# Patient Record
Sex: FEMALE | Race: WHITE | Employment: OTHER | URBAN - METROPOLITAN AREA
[De-identification: names, ages, dates, MRNs, and addresses within clinical notes are randomized per-mention and may not be internally consistent; named-entity substitution may affect disease eponyms.]

---

## 2017-01-16 ENCOUNTER — OFFICE VISIT (OUTPATIENT)
Dept: FAMILY MEDICINE | Facility: CLINIC | Age: 57
End: 2017-01-16
Payer: COMMERCIAL

## 2017-01-16 VITALS
SYSTOLIC BLOOD PRESSURE: 117 MMHG | WEIGHT: 156.9 LBS | HEART RATE: 90 BPM | HEIGHT: 64 IN | DIASTOLIC BLOOD PRESSURE: 90 MMHG | RESPIRATION RATE: 18 BRPM | TEMPERATURE: 97.7 F | OXYGEN SATURATION: 97 % | BODY MASS INDEX: 26.79 KG/M2

## 2017-01-16 DIAGNOSIS — F40.01: ICD-10-CM

## 2017-01-16 DIAGNOSIS — Z79.899 CONTROLLED SUBSTANCE AGREEMENT SIGNED: ICD-10-CM

## 2017-01-16 DIAGNOSIS — F41.0 PANIC DISORDER WITHOUT AGORAPHOBIA: ICD-10-CM

## 2017-01-16 DIAGNOSIS — Z72.0 TOBACCO ABUSE: ICD-10-CM

## 2017-01-16 DIAGNOSIS — F41.9 ANXIETY: Primary | ICD-10-CM

## 2017-01-16 DIAGNOSIS — E78.2 MIXED HYPERLIPIDEMIA: ICD-10-CM

## 2017-01-16 DIAGNOSIS — R03.0 ELEVATED BLOOD PRESSURE READING WITHOUT DIAGNOSIS OF HYPERTENSION: ICD-10-CM

## 2017-01-16 DIAGNOSIS — F33.9 RECURRENT MAJOR DEPRESSIVE DISORDER, REMISSION STATUS UNSPECIFIED (H): ICD-10-CM

## 2017-01-16 PROCEDURE — 99214 OFFICE O/P EST MOD 30 MIN: CPT | Performed by: INTERNAL MEDICINE

## 2017-01-16 RX ORDER — ALPRAZOLAM 0.5 MG
0.5 TABLET ORAL 3 TIMES DAILY PRN
Qty: 90 TABLET | Refills: 2 | Status: SHIPPED | OUTPATIENT
Start: 2017-01-16 | End: 2017-10-02

## 2017-01-16 ASSESSMENT — ANXIETY QUESTIONNAIRES
6. BECOMING EASILY ANNOYED OR IRRITABLE: SEVERAL DAYS
GAD7 TOTAL SCORE: 9
5. BEING SO RESTLESS THAT IT IS HARD TO SIT STILL: MORE THAN HALF THE DAYS
7. FEELING AFRAID AS IF SOMETHING AWFUL MIGHT HAPPEN: SEVERAL DAYS
3. WORRYING TOO MUCH ABOUT DIFFERENT THINGS: MORE THAN HALF THE DAYS
1. FEELING NERVOUS, ANXIOUS, OR ON EDGE: SEVERAL DAYS
IF YOU CHECKED OFF ANY PROBLEMS ON THIS QUESTIONNAIRE, HOW DIFFICULT HAVE THESE PROBLEMS MADE IT FOR YOU TO DO YOUR WORK, TAKE CARE OF THINGS AT HOME, OR GET ALONG WITH OTHER PEOPLE: SOMEWHAT DIFFICULT
2. NOT BEING ABLE TO STOP OR CONTROL WORRYING: SEVERAL DAYS

## 2017-01-16 ASSESSMENT — PATIENT HEALTH QUESTIONNAIRE - PHQ9: 5. POOR APPETITE OR OVEREATING: SEVERAL DAYS

## 2017-01-16 NOTE — PROGRESS NOTES
SUBJECTIVE:                                                    Radha Turcios is a 56 year old female who presents to clinic today for the following health issues:    Radha presents to the clinic to establish care she is accompanied by her  Lexie and her sister Sowmya. Prior patient of Dr. Soriano's for years.    New Patient/Transfer of Care  Radha expresses that she has recurrent panic attacks. She has never seen a therapist or has any therapeutic interventions other than medications. She is currently taking Alprazolam 1-2 tabs daily and 3 times daily if she will be leaving the home. She has extensive fear (maybe a phobia) of leaving the home. Radha notes that she is unable to work due to having panic attacks. Radha grew very teary when discussing control of her anxiety and depression. She is frightened of making medication changes and going off of Xanax. I discussed that her anxiety and depression are not well controlled at this time anyway so I'm trying to help improve her symptoms - she was appreciative of that.  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  Radha continues to take supplemental Vitamin D and fish oil daily.   Pt had a negative FIT stool test this past August; however, her sister was dx with colon cancer. She is not ready to have full colonoscopy.  She has 5 cats, no children, she and her  Lexie have been  a number of years. He is her main  if they go out d/t her fear.    Patient Active Problem List   Diagnosis     Mixed hyperlipidemia     GERD (gastroesophageal reflux disease)     Anxiety     Depression     Insomnia     Controlled substance agreement signed       Panic disorder with agoraphobia, severe agoraphobic avoidance and mild panic attacks     Tobacco abuse     Past Surgical History   Procedure Laterality Date     No history of surgery         Social History   Substance Use Topics     Smoking  status: Current Every Day Smoker -- 1.00 packs/day for 23 years     Types: Cigarettes     Smokeless tobacco: Never Used     Alcohol Use: 0.0 oz/week     0 Standard drinks or equivalent per week      Comment: Occ      Family History   Problem Relation Age of Onset     Lipids Mother      HEART DISEASE Mother      Alzheimer Disease Mother      Allergies Mother      C.A.D. Mother      had MI     CANCER Father 58     pancreatic     HEART DISEASE Brother      C.A.D. Brother      2 brothers  of heart attacks/ one also had lung caner     CANCER Brother      2 brothers   of lung cancer     CANCER Sister      lung     Alcohol/Drug Sister       alcoholic cirrhosis     Depression Sister      anxiety disorder     Depression Brother      anxiety disorder     Colon Cancer Sister 51         Current Outpatient Prescriptions   Medication Sig Dispense Refill     ALPRAZolam (XANAX) 0.5 MG tablet Take 1 tablet (0.5 mg) by mouth 3 times daily as needed 90 tablet 2     valACYclovir (VALTREX) 500 MG tablet TAKE 4 TABLETS ONCE, REPEAT IN 12 HOURS AS NEEDED 8 tablet 2     fenofibrate 145 MG tablet Take 1 tablet (145 mg) by mouth daily 90 tablet 3     vilazodone (VIIBRYD) 40 MG TABS Take 1 tablet (40 mg) by mouth daily 90 tablet 3     rosuvastatin (CRESTOR) 40 MG tablet Take 1 tablet (40 mg) by mouth daily Profile Rx: patient will contact pharmacy when needed 90 tablet 3     amitriptyline (ELAVIL) 25 MG tablet Take 1-2 tablets (25-50 mg) by mouth At Bedtime Profile Rx: patient will contact pharmacy when needed 180 tablet 3     Cyanocobalamin (B-12) 1000 MCG TBCR Take 1,000 mcg by mouth daily 100 tablet 1     omeprazole 20 MG tablet Take 1 tablet (20 mg) by mouth daily 90 tablet 3     Omega-3 Fatty Acids (FISH OIL CONCENTRATE) 1000 MG CAPS        Cholecalciferol (VITAMIN D PO)        Allergies   Allergen Reactions     Amoxicillin      Rash         Problem list and histories reviewed & adjusted, as indicated.    ROS:  Detailed as  "above    This document serves as a record of the services and decisions personally performed and made by Azul Parker DO. It was created on his/her behalf by Renuka Darby, a trained medical scribe. The creation of this document is based the provider's statements to the medical scribe.  Scribe Renuka Darby 3:55 PM, January 16, 2017    OBJECTIVE:                                                    /90 mmHg  Pulse 90  Temp(Src) 97.7  F (36.5  C) (Oral)  Resp 18  Ht 5' 4.25\" (1.632 m)  Wt 156 lb 14.4 oz (71.169 kg)  BMI 26.72 kg/m2  SpO2 97%  LMP 09/14/2001  Breastfeeding? No  Body mass index is 26.72 kg/(m^2).  GENERAL: alert and no distress, appears older than stated age  RESP: lungs clear to auscultation - no rales, rhonchi or wheezes  CV: regular rate and rhythm, normal S1 S2, no S3 or S4, no murmur, click or rub  PSYCH: tearful at times when talking about death of siblings and mother, was re-consolable      ASSESSMENT/PLAN:                                                      1. Anxiety  Sub-optimally controlled, is on Viibryd and Amitriptyline  Planted seed of future counseling and CBT for panic disorder and trying to SLOWLY taper off of Xanax in the future  She is not working, but not formally on disability related to this     and family supportive  Anxiety is quite chronic along with panic disorder - see Dr. Soriano's last OV note which details some of her history nicely   BP was a bit high r/t anxiety/tearfulness but she really did warm up nicely towards end of visit - has some trauma with physicians of her siblings who have passed  - ALPRAZolam (XANAX) 0.5 MG tablet; Take 1 tablet (0.5 mg) by mouth 3 times daily as needed  Dispense: 90 tablet; Refill: 2  CYRUS-7 SCORE 8/7/2015 8/10/2016 1/16/2017   Total Score 14 - -   Total Score - 12 9       2. Panic disorder without agoraphobia  As above    3. Recurrent major depressive disorder, remission status unspecified (H)  As above  PHQ-9 " SCORE 8/7/2015 8/10/2016 1/16/2017   Total Score 15 - -   Total Score - 5 11       4. Mixed hyperlipidemia  Continue Crestor and TriCor as lipids historically quite high    5. Controlled substance agreement signed    6.Tobacco Abuse  Not currently ready to stop, but states is cutting back       Patient Instructions   Xanax (Alprazolam) to be sent to [facility]  Consider some more therapy or medication adjustment in the future - I'd be happy to discuss  For sure you are due for physical later this summer        The information in this document, created by the medical scribe for me, accurately reflects the services I personally performed and the decisions made by me. I have reviewed and approved this document for accuracy prior to leaving the patient care area.  Azul Parker DO  3:55 PM, 01/16/2017    MDM: 25 minutes spent with patient, over 50% time counseling, coordinating care and explaining about nature of the patient's conditions, especially reviewing PMH and chronic anxiety/panic.    Azul Parker DO  [facility]

## 2017-01-16 NOTE — NURSING NOTE
"Chief Complaint   Patient presents with     Establish Care       Initial /90 mmHg  Pulse 112  Temp(Src) 97.7  F (36.5  C) (Oral)  Resp 18  Ht 5' 4.25\" (1.632 m)  Wt 156 lb 14.4 oz (71.169 kg)  BMI 26.72 kg/m2  SpO2 97%  LMP 09/14/2001  Breastfeeding? No Estimated body mass index is 26.72 kg/(m^2) as calculated from the following:    Height as of this encounter: 5' 4.25\" (1.632 m).    Weight as of this encounter: 156 lb 14.4 oz (71.169 kg).  BP completed using cuff size: binh Gary CMA January 16, 2017 3:46 PM      "

## 2017-01-16 NOTE — PATIENT INSTRUCTIONS
Xanax (Alprazolam) to be sent to [facility]  Consider some more therapy or medication adjustment in the future - I'd be happy to discuss  For sure you are due for physical later this summer

## 2017-01-16 NOTE — Clinical Note
[facility]    01/16/2017    Patient: Radha Turcios  YOB: 1960  Medical Record Number: [id number]    Controlled Substance Agreement  I understand that my care provider has prescribed controlled substances (narcotics, tranquilizers, and/or stimulants) to help manage my condition(s).  I am taking this medicine to help me function or work.  I know that this is strong medicine.  It could have serious side effects and even cause a dependency on the drug.  If I stop these medicines suddenly, I could have severe withdrawal symptoms.    The risks, benefits, and side effects of these medication(s) were explained to me.  I agree that:  1. I will take part in other treatments as advised by my provider.  This may be psychiatry or counseling, physical therapy, behavioral therapy, group treatment, or a referral to a pain clinic.  I will reduce or stop my medicine when my provider tells me to do so.   2. I will take my medicines as prescribed.  I will not change the dose or schedule unless my provider tells me to.  There will be no refills if I  run out early.   I may be contacted at any time without warning and asked to complete a drug test or pill count.   3. I will keep all my appointments at the clinic.  If I miss appointments or fail to follow instructions, my provider may stop my medicine.  4. I will not ask other providers to prescribe controlled substances. And I will not accept controlled substances from other people. If I need another prescribed controlled substance for a new reason, I will notify my provider within one business day.  5. If I enroll in the [place] Medical Marijuana program, I will tell my provider.  I will also sign an agreement to share my medical records with my provider.  6. I will use one pharmacy to fill all of my controlled substance prescriptions.  If my prescription is mailed to my pharmacy, it may take 5 to 7 days for my medicine to be ready.  7. I  understand that my provider, clinic care team, and pharmacy can track controlled substance prescriptions from other providers through a central database (prescription monitoring program).  8. I will bring in my list of medications (or my medicine bottles) each time I come to the clinic.  Page 1 of 2      [facility]    01/16/2017    Patient: Radha Turcios  YOB: 1960  Medical Record Number: [id number]    9. Refills of controlled substances will be made only during office hours.  It is up to me to make sure that I do not run out of my medicines on weekends or holidays.    10. I am responsible for my prescriptions.  If the medicine is lost or stolen, it will not be replaced.   I also agree not to share these medicines with anyone.  11. I agree to not use ANY illegal or recreational drugs.  This includes marijuana, cocaine, bath salts or other drugs.  I agree not to use alcohol unless my provider says I may.  I agree to give urine samples whenever asked.  If I fail to give a urine sample, the provider may stop my medicine.     12. I will tell my nurse or provider right away if I become pregnant or have a new medical problem treated outside of [facility].  13. I understand that this medicine can affect my thinking and judgment.  It may be unsafe for me to drive, use machinery and do dangerous tasks.  I will not do any of these things until I know how the medicine affects me.  If my dose changes, I will wait to see how it affects me.  I will contact my provider if I have concerns about medicine side effects.  I understand that if I do not follow any of the conditions above, my prescriptions or treatment may be stopped.    I agree that my provider, clinic care team, and pharmacy may work with any city, state or federal law enforcement agency that investigates the misuse, sale, or other diversion of my controlled medicine. I will allow my provider to discuss my care with or share a copy  of this agreement with any other treating provider, pharmacy or emergency room where I receive care.  I agree to give up (waive) any right of privacy or confidentiality with respect to these authorizations.   I have read this agreement and have asked questions about anything I did not understand.   ___________________________________    ___________________________  Patient Signature                                                             Date and Time  ___________________________________     ____________________________  Witness                                                                              Date and Time  ___________________________________  Azul Parker, DO  Page 2 of 2  Opioid Pain Medicines (also known as Narcotics)  What You Need to Know      What are opioids?   Opioids are pain medicines that must be prescribed by a doctor. Examples are:     morphine (MS Contin, Domenica)    oxycodone (Oxycontin)    oxycodone and acetaminophen (Percocet)    hydrocodone and acetaminophen (Vicodin, Norco)     fentanyl patch (Duragesic)     hydromorphone (Dilaudid)     methadone     What do opioids do well?   Opioids are best for short-term pain after a surgery or injury. They also work well for cancer pain. Unlike other pain medicines, they do not cause liver or kidney failure or ulcers. They may help some people with long-lasting (chronic) pain.     What do opioids NOT do well?   Opioids never get rid of pain entirely, and they do not work well for most patients with chronic pain. Opioids do not reduce swelling, one of the causes of pain. They also don t work well for nerve pain.     Side effects  Talk to your doctor before you start or decide to keep taking one of these medicines. Side effects include:    Lowers your breathing rate enough that it could cause death    Death due to taking more than the prescribed dose    Serious lifelong opioid use      Dependence is not the same as addiction. Addiction is  when people keep using a substance that harms their body, their mind or their relations with others. If you have a history of drug or alcohol abuse, taking opioids can cause a relapse.  Over time, opioids don t work as well. Most people will need higher and higher doses. The higher the dose, the more serious the side effects. We don t know the long-term effects of opioids.   People who have used opioids for a long time have a lower quality of life, worse depression, higher levels of pain and more visits to doctors.  Overdose from prescription drugs is the second leading cause of death in the [place] The risk of overdose rises when opioids are taken with other drugs such as:    Medicines used for anxiety and panic attacks (such as lorazepam, alprazolam, clonazepam    Other sedatives    Alcohol    Illegal drugs such as heroin  Never share your opioids with others. Be sure to store opioids in a secure place, locked if possible.Young children can easily swallow them and overdose.     Are there other ways to manage pain?  Ways to help reduce pain:    Exercise every day.    Treat health problems that may be causing pain.    Treat mental health problems like depression and anxiety.     Worse depression symptoms; Less pleasure in things you usually enjoy    Feeling tired or sluggish    Slower thoughts or cloudy thinking    Being more sensitive to pain over time; Pain is harder to control.    Trouble sleeping or restless sleep    Changes in hormone levels (for example, less testosterone).     Changes in sex drive or ability to have sex    Long lasting nausea and constipation    Trouble breathing while asleep; This is worse with lung problems like COPD or sleep apnea.    Unsafe driving    Getting sick more often    Itching    Feeling dizzy    Dry mouth    Sweating    Trouble emptying the bladder (peeing). This is worse if you have an enlarged prostate or get urinary tract infections (UTIs).    What else should I know about  opioids?  When someone takes opioids for too long or too often, they become dependent. This means that if you stop or reduce the medicine too quickly, you will have withdrawal symptoms.          Practice good sleep habits.  Try to go to bed and get up at the same time every day.    Stop smoking.  Tobacco use can make pain worse.    Do things that you enjoy.    Find a way to work through pain without drugs.  Try deep breathing, meditation, visual imagery and aromatherapy.    Ask your doctor to help you create a plan to manage your pain.

## 2017-01-16 NOTE — MR AVS SNAPSHOT
"              After Visit Summary   1/16/2017    Radha Turcios    MRN: [id number]           Patient Information     Date Of Birth          1960        Visit Information        Provider Department      1/16/2017 4:00 PM Azul Parker,  [facility]        Today's Diagnoses     Anxiety    -  1     Panic disorder without agoraphobia         Recurrent major depressive disorder, remission status unspecified (H)         Mixed hyperlipidemia         Controlled substance agreement signed           Care Instructions    Xanax (Alprazolam) to be sent to [facility]  Consider some more therapy or medication adjustment in the future - I'd be happy to discuss  For sure you are due for physical later this summer        Follow-ups after your visit        Who to contact     If you have questions or need follow up information about today's clinic visit or your schedule please contact [facility] directly at [phone].  Normal or non-critical lab and imaging results will be communicated to you by MyChart, letter or phone within 4 business days after the clinic has received the results. If you do not hear from us within 7 days, please contact the clinic through [employer] or phone. If you have a critical or abnormal lab result, we will notify you by phone as soon as possible.  Submit refill requests through [employer] or call your pharmacy and they will forward the refill request to us. Please allow 3 business days for your refill to be completed.          Additional Information About Your Visit        MyChart Information     [employer] lets you send messages to your doctor, view your test results, renew your prescriptions, schedule appointments and more. To sign up, go to [email or web address] . Click on \"Log in\" on the left side of the screen, which will take you to the Welcome page. Then click on \"Sign up Now\" on the right side of the page.     You will be asked to enter the access code " "listed below, as well as some personal information. Please follow the directions to create your username and password.     Your access code is: [email or web address]  Expires: 2017  4:42 PM     Your access code will  in 90 days. If you need help or a new code, please call your [facility] or [phone].        Care EveryWhere ID     This is your Care EveryWhere ID. This could be used by other organizations to access your [facility] medical records  [id number]        Your Vitals Were     Pulse Temperature Respirations    90 97.7  F (36.5  C) (Oral) 18    Height BMI (Body Mass Index) Pulse Oximetry    5' 4.25\" (1.632 m) 26.72 kg/m2 97%    Last Period Breastfeeding?       2001 No        Blood Pressure from Last 3 Encounters:   17 117/90   08/10/16 125/83   08/07/15 126/78    Weight from Last 3 Encounters:   17 156 lb 14.4 oz (71.169 kg)   08/10/16 152 lb (68.947 kg)   08/07/15 151 lb (68.493 kg)              Today, you had the following     No orders found for display         Today's Medication Changes          These changes are accurate as of: 17  4:42 PM.  If you have any questions, ask your nurse or doctor.               These medicines have changed or have updated prescriptions.        Dose/Directions    fenofibrate 145 MG tablet   This may have changed:  Another medication with the same name was removed. Continue taking this medication, and follow the directions you see here.   Used for:  Mixed hyperlipidemia   Changed by:  Lavern Soriano MD        Dose:  145 mg   Take 1 tablet (145 mg) by mouth daily   Quantity:  90 tablet   Refills:  3       rosuvastatin 40 MG tablet   Commonly known as:  CRESTOR   This may have changed:  Another medication with the same name was removed. Continue taking this medication, and follow the directions you see here.   Used for:  Mixed hyperlipidemia   Changed by:  Lavern Sroiano MD        Dose:  40 mg   Take 1 tablet (40 mg) by mouth " daily Profile Rx: patient will contact pharmacy when needed   Quantity:  90 tablet   Refills:  3       valACYclovir 500 MG tablet   Commonly known as:  VALTREX   This may have changed:  Another medication with the same name was removed. Continue taking this medication, and follow the directions you see here.   Used for:  Cold sore   Changed by:  Lavern Soriano MD        TAKE 4 TABLETS ONCE, REPEAT IN 12 HOURS AS NEEDED   Quantity:  8 tablet   Refills:  2         Stop taking these medicines if you haven't already. Please contact your care team if you have questions.     sulfamethoxazole-trimethoprim 800-160 MG per tablet   Commonly known as:  BACTRIM DS/SEPTRA DS   Stopped by:  Azul Parker,                 Where to get your medicines      Some of these will need a paper prescription and others can be bought over the counter.  Ask your nurse if you have questions.     Bring a paper prescription for each of these medications    - ALPRAZolam 0.5 MG tablet             Primary Care Provider Office Phone # Fax #    Azul Parker [phone]       [address] [address]        Thank you!     Thank you for choosing [facility]  for your care. Our goal is always to provide you with excellent care. Hearing back from our patients is one way we can continue to improve our services. Please take a few minutes to complete the written survey that you may receive in the mail after your visit with us. Thank you!             Your Updated Medication List - Protect others around you: Learn how to safely use, store and throw away your medicines at www.disposemymeds.org.          This list is accurate as of: 1/16/17  4:42 PM.  Always use your most recent med list.                   Brand Name Dispense Instructions for use    ALPRAZolam 0.5 MG tablet    XANAX    90 tablet    Take 1 tablet (0.5 mg) by mouth 3 times daily as needed       amitriptyline  25 MG tablet    ELAVIL    180 tablet    Take 1-2 tablets (25-50 mg) by mouth At Bedtime Profile Rx: patient will contact pharmacy when needed       B-12 1000 MCG Tbcr     100 tablet    Take 1,000 mcg by mouth daily       fenofibrate 145 MG tablet     90 tablet    Take 1 tablet (145 mg) by mouth daily       FISH OIL CONCENTRATE 1000 MG Caps          omeprazole 20 MG tablet     90 tablet    Take 1 tablet (20 mg) by mouth daily       rosuvastatin 40 MG tablet    CRESTOR    90 tablet    Take 1 tablet (40 mg) by mouth daily Profile Rx: patient will contact pharmacy when needed       valACYclovir 500 MG tablet    VALTREX    8 tablet    TAKE 4 TABLETS ONCE, REPEAT IN 12 HOURS AS NEEDED       vilazodone 40 MG Tabs tablet    VIIBRYD    90 tablet    Take 1 tablet (40 mg) by mouth daily       VITAMIN D PO

## 2017-01-17 ASSESSMENT — PATIENT HEALTH QUESTIONNAIRE - PHQ9: SUM OF ALL RESPONSES TO PHQ QUESTIONS 1-9: 11

## 2017-01-17 ASSESSMENT — ANXIETY QUESTIONNAIRES: GAD7 TOTAL SCORE: 9

## 2017-02-01 PROBLEM — Z79.899 CONTROLLED SUBSTANCE AGREEMENT SIGNED: Chronic | Status: ACTIVE | Noted: 2017-01-16

## 2017-02-01 PROBLEM — R03.0 ELEVATED BLOOD PRESSURE READING WITHOUT DIAGNOSIS OF HYPERTENSION: Status: ACTIVE | Noted: 2017-02-01

## 2017-07-20 DIAGNOSIS — K21.9 GASTROESOPHAGEAL REFLUX DISEASE WITHOUT ESOPHAGITIS: ICD-10-CM

## 2017-07-20 NOTE — TELEPHONE ENCOUNTER
Prescription approved per [facility] Refill Protocol.  Brittaney Renae RN

## 2017-07-20 NOTE — TELEPHONE ENCOUNTER
omeprazole (PRILOSEC) 20 MG CR capsule        Last Written Prescription Date: 8/10/2016  Last Fill Quantity: 90,  # refills: 3   Last Office Visit with [facility], [facility] or [facility] prescribing provider: 1/16/2017

## 2017-09-04 DIAGNOSIS — E78.2 MIXED HYPERLIPIDEMIA: ICD-10-CM

## 2017-09-05 RX ORDER — FENOFIBRATE 145 MG/1
TABLET, COATED ORAL
Qty: 90 TABLET | Refills: 3 | Status: SHIPPED | OUTPATIENT
Start: 2017-09-05 | End: 2018-09-02

## 2017-09-05 NOTE — TELEPHONE ENCOUNTER
fenofibrate       Last Written Prescription Date: 08/10/16  Last Fill Quantity: 90, # refills: 3    Last Office Visit with G, P or [facility] prescribing provider:  01/16/17   Future Office Visit:    Next 5 appointments (look out 90 days)     Oct 02, 2017  8:30 AM CDT   [facility]ul Parker DO   [facility]    [address] [address]   [phone]                  Cholesterol   Date Value Ref Range Status   08/10/2016 115 <200 mg/dL Final     HDL Cholesterol   Date Value Ref Range Status   08/10/2016 10 (L) >49 mg/dL Final     LDL Cholesterol Calculated   Date Value Ref Range Status   08/10/2016 52 <100 mg/dL Final     Comment:     Desirable:       <100 mg/dl     Triglycerides   Date Value Ref Range Status   08/10/2016 267 (H) <150 mg/dL Final     Comment:     Borderline high:  150-199 mg/dl   High:             200-499 mg/dl   Very high:       >499 mg/dl   Fasting specimen       Cholesterol/HDL Ratio   Date Value Ref Range Status   08/07/2015 7.6 (H) 0.0 - 5.0 Final     ALT   Date Value Ref Range Status   08/10/2016 23 0 - 50 U/L Final        Roxie Interiano MA

## 2017-09-05 NOTE — TELEPHONE ENCOUNTER
Routing refill request to provider for review/approval because:  Patient now established with you  Last Rx for below med from former PCP Dr Soriano  Please approve if appropriate  Natalie LOERA RN

## 2017-10-02 ENCOUNTER — OFFICE VISIT (OUTPATIENT)
Dept: FAMILY MEDICINE | Facility: CLINIC | Age: 57
End: 2017-10-02
Payer: COMMERCIAL

## 2017-10-02 VITALS
HEART RATE: 98 BPM | TEMPERATURE: 96.9 F | SYSTOLIC BLOOD PRESSURE: 131 MMHG | OXYGEN SATURATION: 97 % | HEIGHT: 64 IN | DIASTOLIC BLOOD PRESSURE: 86 MMHG | WEIGHT: 150.1 LBS | BODY MASS INDEX: 25.62 KG/M2

## 2017-10-02 DIAGNOSIS — Z11.59 NEED FOR HEPATITIS C SCREENING TEST: ICD-10-CM

## 2017-10-02 DIAGNOSIS — Z00.01 ENCOUNTER FOR PREVENTATIVE ADULT HEALTH CARE EXAM WITH ABNORMAL FINDINGS: Primary | ICD-10-CM

## 2017-10-02 DIAGNOSIS — Z72.0 TOBACCO ABUSE: Chronic | ICD-10-CM

## 2017-10-02 DIAGNOSIS — E78.2 MIXED HYPERLIPIDEMIA: ICD-10-CM

## 2017-10-02 DIAGNOSIS — F41.9 ANXIETY: ICD-10-CM

## 2017-10-02 DIAGNOSIS — Z12.11 SCREEN FOR COLON CANCER: ICD-10-CM

## 2017-10-02 DIAGNOSIS — Z12.31 VISIT FOR SCREENING MAMMOGRAM: ICD-10-CM

## 2017-10-02 DIAGNOSIS — F33.9 EPISODE OF RECURRENT MAJOR DEPRESSIVE DISORDER, UNSPECIFIED DEPRESSION EPISODE SEVERITY (H): ICD-10-CM

## 2017-10-02 LAB
ALBUMIN SERPL-MCNC: 4.1 G/DL (ref 3.4–5)
ALP SERPL-CCNC: 44 U/L (ref 40–150)
ALT SERPL W P-5'-P-CCNC: 24 U/L (ref 0–50)
ANION GAP SERPL CALCULATED.3IONS-SCNC: 8 MMOL/L (ref 3–14)
AST SERPL W P-5'-P-CCNC: 50 U/L (ref 0–45)
BILIRUB SERPL-MCNC: 0.7 MG/DL (ref 0.2–1.3)
BUN SERPL-MCNC: 13 MG/DL (ref 7–30)
CALCIUM SERPL-MCNC: 8.8 MG/DL (ref 8.5–10.1)
CHLORIDE SERPL-SCNC: 101 MMOL/L (ref 94–109)
CHOLEST SERPL-MCNC: 90 MG/DL
CO2 SERPL-SCNC: 25 MMOL/L (ref 20–32)
CREAT SERPL-MCNC: 0.64 MG/DL (ref 0.52–1.04)
DEPRECATED CALCIDIOL+CALCIFEROL SERPL-MC: 42 UG/L (ref 20–75)
ERYTHROCYTE [DISTWIDTH] IN BLOOD BY AUTOMATED COUNT: 13.6 % (ref 10–15)
GFR SERPL CREATININE-BSD FRML MDRD: >90 ML/MIN/1.7M2
GLUCOSE SERPL-MCNC: 85 MG/DL (ref 70–99)
HCT VFR BLD AUTO: 38.4 % (ref 35–47)
HCV AB SERPL QL IA: NONREACTIVE
HDLC SERPL-MCNC: 12 MG/DL
HGB BLD-MCNC: 13.6 G/DL (ref 11.7–15.7)
LDLC SERPL CALC-MCNC: 42 MG/DL
MCH RBC QN AUTO: 38.6 PG (ref 26.5–33)
MCHC RBC AUTO-ENTMCNC: 35.4 G/DL (ref 31.5–36.5)
MCV RBC AUTO: 109 FL (ref 78–100)
NONHDLC SERPL-MCNC: 78 MG/DL
PLATELET # BLD AUTO: 167 10E9/L (ref 150–450)
POTASSIUM SERPL-SCNC: 4 MMOL/L (ref 3.4–5.3)
PROT SERPL-MCNC: 8.1 G/DL (ref 6.8–8.8)
RBC # BLD AUTO: 3.52 10E12/L (ref 3.8–5.2)
SODIUM SERPL-SCNC: 134 MMOL/L (ref 133–144)
T4 FREE SERPL-MCNC: 1.22 NG/DL (ref 0.76–1.46)
TRIGL SERPL-MCNC: 179 MG/DL
TSH SERPL DL<=0.005 MIU/L-ACNC: 5.06 MU/L (ref 0.4–4)
WBC # BLD AUTO: 5.6 10E9/L (ref 4–11)

## 2017-10-02 PROCEDURE — 82306 VITAMIN D 25 HYDROXY: CPT | Performed by: INTERNAL MEDICINE

## 2017-10-02 PROCEDURE — 80061 LIPID PANEL: CPT | Performed by: INTERNAL MEDICINE

## 2017-10-02 PROCEDURE — 99396 PREV VISIT EST AGE 40-64: CPT | Performed by: INTERNAL MEDICINE

## 2017-10-02 PROCEDURE — 80053 COMPREHEN METABOLIC PANEL: CPT | Performed by: INTERNAL MEDICINE

## 2017-10-02 PROCEDURE — 86803 HEPATITIS C AB TEST: CPT | Performed by: INTERNAL MEDICINE

## 2017-10-02 PROCEDURE — 84439 ASSAY OF FREE THYROXINE: CPT | Performed by: INTERNAL MEDICINE

## 2017-10-02 PROCEDURE — 84443 ASSAY THYROID STIM HORMONE: CPT | Performed by: INTERNAL MEDICINE

## 2017-10-02 PROCEDURE — 85027 COMPLETE CBC AUTOMATED: CPT | Performed by: INTERNAL MEDICINE

## 2017-10-02 PROCEDURE — 36415 COLL VENOUS BLD VENIPUNCTURE: CPT | Performed by: INTERNAL MEDICINE

## 2017-10-02 RX ORDER — VALACYCLOVIR HYDROCHLORIDE 500 MG/1
TABLET, FILM COATED ORAL
Qty: 8 TABLET | Refills: 2 | Status: CANCELLED | OUTPATIENT
Start: 2017-10-02

## 2017-10-02 RX ORDER — VILAZODONE HYDROCHLORIDE 40 MG/1
40 TABLET ORAL DAILY
Qty: 90 TABLET | Refills: 3 | Status: CANCELLED | OUTPATIENT
Start: 2017-10-02

## 2017-10-02 RX ORDER — ALPRAZOLAM 0.5 MG
0.5 TABLET ORAL 3 TIMES DAILY PRN
Qty: 90 TABLET | Refills: 2 | Status: CANCELLED | OUTPATIENT
Start: 2017-10-02

## 2017-10-02 RX ORDER — ROSUVASTATIN CALCIUM 40 MG/1
40 TABLET, COATED ORAL DAILY
Qty: 90 TABLET | Refills: 3 | Status: CANCELLED | OUTPATIENT
Start: 2017-10-02

## 2017-10-02 RX ORDER — ALPRAZOLAM 0.5 MG
0.5 TABLET ORAL 3 TIMES DAILY PRN
Qty: 90 TABLET | Refills: 0 | Status: SHIPPED | OUTPATIENT
Start: 2017-10-02 | End: 2018-01-13

## 2017-10-02 RX ORDER — VILAZODONE HYDROCHLORIDE 40 MG/1
40 TABLET ORAL DAILY
Qty: 90 TABLET | Refills: 3 | Status: SHIPPED | OUTPATIENT
Start: 2017-10-02 | End: 2018-09-23

## 2017-10-02 RX ORDER — ROSUVASTATIN CALCIUM 40 MG/1
40 TABLET, COATED ORAL DAILY
Qty: 90 TABLET | Refills: 3 | Status: SHIPPED | OUTPATIENT
Start: 2017-10-02 | End: 2018-07-18

## 2017-10-02 ASSESSMENT — ANXIETY QUESTIONNAIRES
2. NOT BEING ABLE TO STOP OR CONTROL WORRYING: MORE THAN HALF THE DAYS
5. BEING SO RESTLESS THAT IT IS HARD TO SIT STILL: NOT AT ALL
IF YOU CHECKED OFF ANY PROBLEMS ON THIS QUESTIONNAIRE, HOW DIFFICULT HAVE THESE PROBLEMS MADE IT FOR YOU TO DO YOUR WORK, TAKE CARE OF THINGS AT HOME, OR GET ALONG WITH OTHER PEOPLE: SOMEWHAT DIFFICULT
GAD7 TOTAL SCORE: 12
3. WORRYING TOO MUCH ABOUT DIFFERENT THINGS: MORE THAN HALF THE DAYS
1. FEELING NERVOUS, ANXIOUS, OR ON EDGE: MORE THAN HALF THE DAYS
7. FEELING AFRAID AS IF SOMETHING AWFUL MIGHT HAPPEN: MORE THAN HALF THE DAYS
6. BECOMING EASILY ANNOYED OR IRRITABLE: MORE THAN HALF THE DAYS

## 2017-10-02 ASSESSMENT — PATIENT HEALTH QUESTIONNAIRE - PHQ9
5. POOR APPETITE OR OVEREATING: MORE THAN HALF THE DAYS
SUM OF ALL RESPONSES TO PHQ QUESTIONS 1-9: 10

## 2017-10-02 NOTE — MR AVS SNAPSHOT
After Visit Summary   10/2/2017    Radha Turcios    MRN: [id number]           Patient Information     Date Of Birth          1960        Visit Information        Provider Department      10/2/2017 8:30 AM Azul Parker,  [facility] [address]        Today's Diagnoses     Encounter for preventative adult health care exam with abnormal findings    -  1    Episode of recurrent major depressive disorder, unspecified depression episode severity (H)        Anxiety        Mixed hyperlipidemia        Tobacco abuse        Screen for colon cancer        Visit for screening mammogram        Need for hepatitis C screening test        Need for prophylactic vaccination and inoculation against influenza          Care Instructions    Labs today  Schedule mammogram  - [phone] in [address]   I refilled your medications --> fenofibrate is the same as Tricor  I suggest follow up in 6 months to recheck your anxiety    Preventive Health Recommendations  Female Ages 50 - 64    Yearly exam: See your health care provider every year in order to  o Review health changes.   o Discuss preventive care.    o Review your medicines if your doctor has prescribed any.      Get a Pap test every three years (unless you have an abnormal result and your provider advises testing more often).    If you get Pap tests with HPV test, you only need to test every 5 years, unless you have an abnormal result.     You do not need a Pap test if your uterus was removed (hysterectomy) and you have not had cancer.    You should be tested each year for STDs (sexually transmitted diseases) if you're at risk.     Have a mammogram every 1 to 2 years.    Have a colonoscopy at age 50, or have a yearly FIT test (stool test). These exams screen for colon cancer.      Have a cholesterol test every 5 years, or more often if advised.    Have a diabetes test (fasting glucose) every three years. If you are  at risk for diabetes, you should have this test more often.     If you are at risk for osteoporosis (brittle bone disease), think about having a bone density scan (DEXA).    Shots: Get a flu shot each year. Get a tetanus shot every 10 years.    Nutrition:     Eat at least 5 servings of fruits and vegetables each day.    Eat whole-grain bread, whole-wheat pasta and brown rice instead of white grains and rice.    Talk to your provider about Calcium and Vitamin D.     Lifestyle    Exercise at least 150 minutes a week (30 minutes a day, 5 days a week). This will help you control your weight and prevent disease.    Limit alcohol to one drink per day.    No smoking.     Wear sunscreen to prevent skin cancer.     See your dentist every six months for an exam and cleaning.    See your eye doctor every 1 to 2 years.            Follow-ups after your visit        Future tests that were ordered for you today     Open Future Orders        Priority Expected Expires Ordered    MA SCREENING DIGITAL BILAT - Future  (s+30) Routine  10/2/2018 10/2/2017    Fecal colorectal cancer screen (FIT) Routine 10/23/2017 12/25/2017 10/2/2017            Who to contact     If you have questions or need follow up information about today's clinic visit or your schedule please contact [facility] directly at [phone].  Normal or non-critical lab and imaging results will be communicated to you by MyChart, letter or phone within 4 business days after the clinic has received the results. If you do not hear from us within 7 days, please contact the clinic through MyChart or phone. If you have a critical or abnormal lab result, we will notify you by phone as soon as possible.  Submit refill requests through [employer] or call your pharmacy and they will forward the refill request to us. Please allow 3 business days for your refill to be completed.          Additional Information About Your Visit        Care EveryWhere ID     This is your Care  "EveryWhere ID. This could be used by other organizations to access your [place] medical records  [id number]        Your Vitals Were     Pulse Temperature Height Last Period Pulse Oximetry BMI (Body Mass Index)    98 96.9  F (36.1  C) (Tympanic) 5' 4.25\" (1.632 m) 09/14/2001 97% 25.56 kg/m2       Blood Pressure from Last 3 Encounters:   10/02/17 131/86   01/16/17 117/90   08/10/16 125/83    Weight from Last 3 Encounters:   10/02/17 150 lb 1.6 oz (68.1 kg)   01/16/17 156 lb 14.4 oz (71.2 kg)   08/10/16 152 lb (68.9 kg)              We Performed the Following     CBC with platelets     Comprehensive metabolic panel     Hepatitis C Screen Reflex to HCV RNA Quant and Genotype     Lipid panel reflex to direct LDL     TSH with free T4 reflex     Vitamin D Deficiency          Today's Medication Changes          These changes are accurate as of: 10/2/17  9:22 AM.  If you have any questions, ask your nurse or doctor.               These medicines have changed or have updated prescriptions.        Dose/Directions    amitriptyline 25 MG tablet   Commonly known as:  ELAVIL   This may have changed:  additional instructions   Used for:  Episode of recurrent major depressive disorder, unspecified depression episode severity (H), Anxiety   Changed by:  Azul Parker DO        Dose:  25-50 mg   Take 1-2 tablets (25-50 mg) by mouth At Bedtime   Quantity:  180 tablet   Refills:  3       rosuvastatin 40 MG tablet   Commonly known as:  CRESTOR   This may have changed:  additional instructions   Used for:  Mixed hyperlipidemia   Changed by:  Azul Parker DO        Dose:  40 mg   Take 1 tablet (40 mg) by mouth daily   Quantity:  90 tablet   Refills:  3            Where to get your medicines      These medications were sent to [facility] - [address]  [address] [facility] [address]     Phone:  [phone]     amitriptyline 25 MG tablet    rosuvastatin 40 MG tablet    " vilazodone 40 MG Tabs tablet         Some of these will need a paper prescription and others can be bought over the counter.  Ask your nurse if you have questions.     Bring a paper prescription for each of these medications     ALPRAZolam 0.5 MG tablet                Primary Care Provider Office Phone # Fax #    Azul Parker [phone] [address] [facility] [address]  [facility] [address]        Equal Access to Services     JOEY CARRASCO : Hadii aad ku hadasho Soomaali, waaxda luqadaha, qaybta kaalmada adeegyada, waxay idiin hayaan adeeg kharash la'pedro . So [facility] [phone].    ATENCIÓN: Si joela dawit, tiene a rose disposición servicios gratuitos de asistencia lingüística. [facility] [phone].    We comply with applicable federal civil rights laws and [place] laws. We do not discriminate on the basis of race, color, national origin, age, disability, sex, sexual orientation, or gender identity.            Thank you!     Thank you for choosing [facility]  for your care. Our goal is always to provide you with excellent care. Hearing back from our patients is one way we can continue to improve our services. Please take a few minutes to complete the written survey that you may receive in the mail after your visit with us. Thank you!             Your Updated Medication List - Protect others around you: Learn how to safely use, store and throw away your medicines at www.disposemymeds.org.          This list is accurate as of: 10/2/17  9:22 AM.  Always use your most recent med list.                   Brand Name Dispense Instructions for use Diagnosis    ALPRAZolam 0.5 MG tablet    XANAX    90 tablet    Take 1 tablet (0.5 mg) by mouth 3 times daily as needed    Anxiety       amitriptyline 25 MG tablet    ELAVIL    180 tablet    Take 1-2 tablets (25-50 mg) by mouth At Bedtime    Episode of recurrent major depressive disorder, unspecified depression episode severity (H), Anxiety       B-12  1000 MCG Tbcr     100 tablet    Take 1,000 mcg by mouth daily    Vitamin B12 deficiency (non anemic)       fenofibrate 145 MG tablet     90 tablet    TAKE 1 TABLET DAILY    Mixed hyperlipidemia       FISH OIL CONCENTRATE 1000 MG Caps           omeprazole 20 MG CR capsule    priLOSEC    90 capsule    TAKE 1 CAPSULE DAILY    Gastroesophageal reflux disease without esophagitis       rosuvastatin 40 MG tablet    CRESTOR    90 tablet    Take 1 tablet (40 mg) by mouth daily    Mixed hyperlipidemia       valACYclovir 500 MG tablet    VALTREX    8 tablet    TAKE 4 TABLETS ONCE, REPEAT IN 12 HOURS AS NEEDED    Cold sore       vilazodone 40 MG Tabs tablet    VIIBRYD    90 tablet    Take 1 tablet (40 mg) by mouth daily    Anxiety, Episode of recurrent major depressive disorder, unspecified depression episode severity (H)       VITAMIN D PO

## 2017-10-02 NOTE — PROGRESS NOTES
"   SUBJECTIVE:   CC: Radha RODGER Turcios is an 57 year old woman who presents for preventive health visit.     Healthy Habits:    Do you get at least three servings of calcium containing foods daily (dairy, green leafy vegetables, etc.)? yes    Amount of exercise or daily activities, outside of work: 1-2 day(s) per week - tries to walk a little bit but hindered by her back but says has \"chronic back pain\" - flared when bending over to feed cats - ice/heat over the weekend    Problems taking medications regularly No    Medication side effects: No    Have you had an eye exam in the past two years? yes    Do you see a dentist twice per year? no    Do you have sleep apnea, excessive snoring or daytime drowsiness?no    See above re: back pain. Doing own exercises so doesn't want physical therapy.   Describes across lower back and tailbone without radiculopathy.   helps with massage.  Doesn't take NSAIDs on a regular basis.  1 ppd smoker with morning cough which is mild at times. No blood in sputum. Smoking on and off for several years - estimates about 20 years. Declines flu shot.  Denies chest pain.  No breast changes or skin concerns.  Sister with colon cancer. Radha denies changes in bowel habits. Declines colonoscopy but would do FIT again. Nervous about colonoscopy.  Postmenopausal and no vaginal bleeding.  GERD controlled with Prilosec.  Valtrex only 2-3 times per year for cold sores.  She has trouble knowing her medications and is getting mixed up with brand and generic names. Relies on .   Severe anxiety - says takes 1-2 Amitriptyline at night and sleeps well though.   Viibryd is helpful and  notices it really helps her; if she accidentally misses it then she is really wound-up.  Declines extra counseling or frequent f/u to improve life and anxiety.  Gets confused about her Xanax and says \"I take it once per day\" - but then \"I take it morning, afternoon and night\". Says sometimes doesn't " "take it at all.    Today's PHQ-2 Score:   PHQ-2 ( 1999 [employer]) 10/2/2017 1/16/2017   Q1: Little interest or pleasure in doing things 0 2   Q2: Feeling down, depressed or hopeless 3 1   PHQ-2 Score 3 3       Abuse: Current or Past(Physical, Sexual or Emotional) - No  Do you feel safe in your environment - Yes    Social History   Substance Use Topics     Smoking status: Current Every Day Smoker     Packs/day: 1.00     Years: 23.00     Types: Cigarettes     Smokeless tobacco: Never Used     Alcohol use 0.0 oz/week     0 Standard drinks or equivalent per week      Comment: 4-5 drinks per week     The patient does not drink >3 drinks per day nor >7 drinks per week.    Reviewed orders with patient.  Reviewed health maintenance and updated orders accordingly - Yes  Patient over age 50, mutual decision to screen reflected in health maintenance.  Pertinent mammograms are reviewed under the imaging tab.  History of abnormal Pap smear: Yes, several years ago though - she estimates > 10 yrs ago colposcopy  - age 30-65 PAP every 3-5 years with negative HPV co-testing recommended    ROS:  Comprehensive ROS negative unless as stated above in HPI.      OBJECTIVE:   /86 (BP Location: Left arm, Patient Position: Chair, Cuff Size: Adult Regular)  Pulse 98  Temp 96.9  F (36.1  C) (Tympanic)  Ht 5' 4.25\" (1.632 m)  Wt 150 lb 1.6 oz (68.1 kg)  LMP 09/14/2001  SpO2 97%  BMI 25.56 kg/m2  EXAM:  GENERAL APPEARANCE: alert, no distress and appears older than stated age  EYES: Eyes grossly normal to inspection, PERRL and conjunctivae and sclerae normal  HENT: ear canals and TM's normal, nose and mouth without ulcers or lesions, oropharynx clear and oral mucous membranes moist  NECK: no adenopathy, no asymmetry, masses, or scars and thyroid normal to palpation  RESP: lungs clear to auscultation - no rales, rhonchi or wheezes  BREAST: normal without masses, tenderness or nipple discharge and no palpable axillary masses or " adenopathy  CV: regular rate and rhythm, normal S1 S2, no S3 or S4, no murmur, click or rub, no peripheral edema   ABDOMEN: soft, nontender, no hepatosplenomegaly, no masses and bowel sounds normal  MS: no musculoskeletal defects are noted and is slightly cautious getting up and down from exam table  SKIN: no suspicious lesions or rashes  NEURO: Normal strength and tone, speech slowed, vague about her medications  PSYCH: nervous, anxious    PHQ-9 SCORE 8/10/2016 1/16/2017 10/2/2017   Total Score - - -   Total Score 5 11 10     CYRUS-7 SCORE 8/10/2016 1/16/2017 10/2/2017   Total Score - - -   Total Score 12 9 12         ASSESSMENT/PLAN:   1. Encounter for preventative adult health care exam with abnormal findings  Declines flu shot  Also declines physical therapy for her back pain flare up - is getting better she says  - CBC with platelets  - Comprehensive metabolic panel  - Vitamin D Deficiency    2. Episode of recurrent major depressive disorder, unspecified depression episode severity (H)  Poor scores but she feels stable  - vilazodone (VIIBRYD) 40 MG TABS tablet; Take 1 tablet (40 mg) by mouth daily  Dispense: 90 tablet; Refill: 3  - amitriptyline (ELAVIL) 25 MG tablet; Take 1-2 tablets (25-50 mg) by mouth At Bedtime  Dispense: 180 tablet; Refill: 3    3. Anxiety  Per her prior PCP who knew Radha well, Radha and family have very strong family h/o significant anxiety  Told her f/u in 6 months at the minimum is expected  Her Xanax fill dates don't match - unsure how she is actually taking this as noted above  Has been on same dose a long time  Declines counseling  - TSH with free T4 reflex  - ALPRAZolam (XANAX) 0.5 MG tablet; Take 1 tablet (0.5 mg) by mouth 3 times daily as needed  Dispense: 90 tablet; Refill: 0  - vilazodone (VIIBRYD) 40 MG TABS tablet; Take 1 tablet (40 mg) by mouth daily  Dispense: 90 tablet; Refill: 3  - amitriptyline (ELAVIL) 25 MG tablet; Take 1-2 tablets (25-50 mg) by mouth At Bedtime   "Dispense: 180 tablet; Refill: 3    4. Mixed hyperlipidemia  Wants to stay on TG medication  - Lipid panel reflex to direct LDL  - rosuvastatin (CRESTOR) 40 MG tablet; Take 1 tablet (40 mg) by mouth daily  Dispense: 90 tablet; Refill: 3    5. Tobacco abuse  Not ready to quit  Doesn't fit into lung cancer screening guidelines    6. Screen for colon cancer  Sister with colon cancer but she declines colonoscopy  - Fecal colorectal cancer screen (FIT); Future    7. Visit for screening mammogram  - MA SCREENING DIGITAL BILAT - Future  (s+30); Future    8. Need for hepatitis C screening test  - Hepatitis C Screen Reflex to HCV RNA Quant and Genotype    COUNSELING:   Reviewed preventive health counseling, as reflected in patient instructions       Regular exercise       Healthy diet/nutrition   reports that she has been smoking Cigarettes.  She has a 23.00 pack-year smoking history. She has never used smokeless tobacco.  Tobacco Cessation Action Plan: Information offered: Patient not interested at this time  Estimated body mass index is 25.56 kg/(m^2) as calculated from the following:    Height as of this encounter: 5' 4.25\" (1.632 m).    Weight as of this encounter: 150 lb 1.6 oz (68.1 kg).   Weight management plan: Discussed healthy diet and exercise guidelines and patient will follow up in 6 months in clinic to re-evaluate.    Patient Instructions   Labs today  Schedule mammogram  - [phone] in [address]   I refilled your medications --> fenofibrate is the same as Tricor  I suggest follow up in 6 months to recheck your anxiety      Azul Parker, DO  [facility]  "

## 2017-10-02 NOTE — LETTER
[facility]  [address] [facility]  [address]  [place]  [address]  Tel: [phone]    October 16, 2017    Radha Turcios  [address]   [facility] [address]        Radha,    It was nice to see you in clinic recently!   I'm happy to report that your stool test is within normal limits.   Your vitamin D is within normal limits on your current supplement.  Your hepatitis C screening test is normal.  Your kidney function (GFR) is normal. Your fasting glucose is normal; thus there is no evidence of diabetes at this time.  One liver test (AST) and thyroid test (TSH) is slightly abnormal. I'd suggest a recheck of these labs in a few months at a follow up office visit to follow the trend. Nothing is urgent about this but it should be rechecked in a few months.  Please try to limit alcohol intake as that can be hard on the liver.   Your cholesterol level is also plenty low. I suggest cutting your Crestor dose in half as that may also improve your liver AST number.  In summary, please schedule an office visit where I can recheck fasting lab work in about 3 months after you have cut your Crestor dose in half.  Please let me know if you have any questions or concerns.     Sincerely,    Azul Parker, DO /jennifer          Resulted Orders   Hepatitis C Screen Reflex to HCV RNA Quant and Genotype   Result Value Ref Range    Hepatitis C Antibody Nonreactive NR^Nonreactive      Comment:      Assay performance characteristics have not been established for newborns,   infants, and children     Lipid panel reflex to direct LDL   Result Value Ref Range    Cholesterol 90 <200 mg/dL    Triglycerides 179 (H) <150 mg/dL      Comment:      Borderline high:  150-199 mg/dl  High:             200-499 mg/dl  Very high:       >499 mg/dl  Fasting specimen      HDL Cholesterol 12 (L) >49 mg/dL    LDL Cholesterol Calculated 42 <100 mg/dL      Comment:      Desirable:       <100 mg/dl    Non HDL Cholesterol 78 <130  mg/dL   CBC with platelets   Result Value Ref Range    WBC 5.6 4.0 - 11.0 10e9/L    RBC Count 3.52 (L) 3.8 - 5.2 10e12/L    Hemoglobin 13.6 11.7 - 15.7 g/dL    Hematocrit 38.4 35.0 - 47.0 %     (H) 78 - 100 fl    MCH 38.6 (H) 26.5 - 33.0 pg    MCHC 35.4 31.5 - 36.5 g/dL    RDW 13.6 10.0 - 15.0 %    Platelet Count 167 150 - 450 10e9/L   Comprehensive metabolic panel   Result Value Ref Range    Sodium 134 133 - 144 mmol/L    Potassium 4.0 3.4 - 5.3 mmol/L    Chloride 101 94 - 109 mmol/L    Carbon Dioxide 25 20 - 32 mmol/L    Anion Gap 8 3 - 14 mmol/L    Glucose 85 70 - 99 mg/dL      Comment:      Fasting specimen    Urea Nitrogen 13 7 - 30 mg/dL    Creatinine 0.64 0.52 - 1.04 mg/dL    GFR Estimate >90 >60 mL/min/1.7m2      Comment:      Non  GFR Calc    GFR Estimate If Black >90 >60 mL/min/1.7m2      Comment:       GFR Calc    Calcium 8.8 8.5 - 10.1 mg/dL    Bilirubin Total 0.7 0.2 - 1.3 mg/dL    Albumin 4.1 3.4 - 5.0 g/dL    Protein Total 8.1 6.8 - 8.8 g/dL    Alkaline Phosphatase 44 40 - 150 U/L    ALT 24 0 - 50 U/L    AST 50 (H) 0 - 45 U/L   TSH with free T4 reflex   Result Value Ref Range    TSH 5.06 (H) 0.40 - 4.00 mU/L   Vitamin D Deficiency   Result Value Ref Range    Vitamin D Deficiency screening 42 20 - 75 ug/L      Comment:      Season, race, dietary intake, and treatment affect the concentration of   25-hydroxy-Vitamin D. Values may decrease during winter months and increase   during summer months. Values 20-29 ug/L may indicate Vitamin D insufficiency   and values <20 ug/L may indicate Vitamin D deficiency.  Vitamin D determination is routinely performed by an immunoassay specific for   25 hydroxyvitamin D3.  If an individual is on vitamin D2 (ergocalciferol)   supplementation, please specify 25 OH vitamin D2 and D3 level determination by   LCMSMS test [id number].     T4 free   Result Value Ref Range    T4 Free 1.22 0.76 - 1.46 ng/dL

## 2017-10-02 NOTE — PATIENT INSTRUCTIONS
Labs today  Schedule mammogram  - [phone] in [address]   I refilled your medications --> fenofibrate is the same as Tricor  I suggest follow up in 6 months to recheck your anxiety    Preventive Health Recommendations  Female Ages 50 - 64    Yearly exam: See your health care provider every year in order to  o Review health changes.   o Discuss preventive care.    o Review your medicines if your doctor has prescribed any.      Get a Pap test every three years (unless you have an abnormal result and your provider advises testing more often).    If you get Pap tests with HPV test, you only need to test every 5 years, unless you have an abnormal result.     You do not need a Pap test if your uterus was removed (hysterectomy) and you have not had cancer.    You should be tested each year for STDs (sexually transmitted diseases) if you're at risk.     Have a mammogram every 1 to 2 years.    Have a colonoscopy at age 50, or have a yearly FIT test (stool test). These exams screen for colon cancer.      Have a cholesterol test every 5 years, or more often if advised.    Have a diabetes test (fasting glucose) every three years. If you are at risk for diabetes, you should have this test more often.     If you are at risk for osteoporosis (brittle bone disease), think about having a bone density scan (DEXA).    Shots: Get a flu shot each year. Get a tetanus shot every 10 years.    Nutrition:     Eat at least 5 servings of fruits and vegetables each day.    Eat whole-grain bread, whole-wheat pasta and brown rice instead of white grains and rice.    Talk to your provider about Calcium and Vitamin D.     Lifestyle    Exercise at least 150 minutes a week (30 minutes a day, 5 days a week). This will help you control your weight and prevent disease.    Limit alcohol to one drink per day.    No smoking.     Wear sunscreen to prevent skin cancer.     See your dentist every six months for an exam  and cleaning.    See your eye doctor every 1 to 2 years.

## 2017-10-02 NOTE — NURSING NOTE
"Chief Complaint   Patient presents with     Physical       Initial /86 (BP Location: Left arm, Patient Position: Chair, Cuff Size: Adult Regular)  Pulse 98  Temp 96.9  F (36.1  C) (Tympanic)  Ht 5' 4.25\" (1.632 m)  Wt 150 lb 1.6 oz (68.1 kg)  LMP 09/14/2001  SpO2 97%  BMI 25.56 kg/m2 Estimated body mass index is 25.56 kg/(m^2) as calculated from the following:    Height as of this encounter: 5' 4.25\" (1.632 m).    Weight as of this encounter: 150 lb 1.6 oz (68.1 kg).  Medication Reconciliation: complete   Selina Lopez MA    "

## 2017-10-03 ASSESSMENT — ANXIETY QUESTIONNAIRES: GAD7 TOTAL SCORE: 12

## 2017-10-08 PROCEDURE — 82274 ASSAY TEST FOR BLOOD FECAL: CPT | Performed by: INTERNAL MEDICINE

## 2017-10-12 DIAGNOSIS — Z12.11 SCREEN FOR COLON CANCER: ICD-10-CM

## 2017-10-12 LAB — HEMOCCULT STL QL IA: NEGATIVE

## 2017-10-13 ENCOUNTER — HOSPITAL ENCOUNTER (OUTPATIENT)
Dept: MAMMOGRAPHY | Facility: CLINIC | Age: 57
Discharge: HOME | End: 2017-10-13
Attending: INTERNAL MEDICINE | Admitting: INTERNAL MEDICINE
Payer: COMMERCIAL

## 2017-10-13 ENCOUNTER — TELEPHONE (OUTPATIENT)
Dept: FAMILY MEDICINE | Facility: CLINIC | Age: 57
End: 2017-10-13

## 2017-10-13 DIAGNOSIS — Z12.31 VISIT FOR SCREENING MAMMOGRAM: ICD-10-CM

## 2017-10-13 PROCEDURE — G0202 SCR MAMMO BI INCL CAD: HCPCS

## 2017-10-13 NOTE — TELEPHONE ENCOUNTER
Dr. Parker,  Do you have any comments/concerns on patient's recent lab results? Patient would like labs sent to home address

## 2017-10-13 NOTE — TELEPHONE ENCOUNTER
Reason for Call:  Request for results:    Name of test or procedure: Lans     Date of test of procedure: 10/2/17     Location of the test or procedure: [facility]    OK to leave the result message on voice mail or with a family member? YES    Phone number Patient can be reached at:  Home number on file [phone] (home)    Additional comments: pt would like her lab results sent to her home from her labs on 10/2    Call taken on 10/13/2017 at 4:36 PM by Candelaria Marcelo

## 2017-10-15 NOTE — PROGRESS NOTES
Please send a copy of their test results and a letter to the patient as follows. Thanks!  PLEASE INCLUDE ALONG WITH HER FIT TESTING.  Radha,  It was nice to see you in clinic recently!   I'm happy to report that your stool test is within normal limits.   Your vitamin D is within normal limits on your current supplement.  Your hepatitis C screening test is normal.  Your kidney function (GFR) is normal. Your fasting glucose is normal; thus there is no evidence of diabetes at this time.  One liver test (AST) and thyroid test (TSH) is slightly abnormal. I'd suggest a recheck of these labs in a few months at a follow up office visit to follow the trend. Nothing is urgent about this but it should be rechecked in a few months.  Please try to limit alcohol intake as that can be hard on the liver.   Your cholesterol level is also plenty low. I suggest cutting your Crestor dose in half as that may also improve your liver AST number.  In summary, please schedule an office visit where I can recheck fasting lab work in about 3 months after you have cut your Crestor dose in half.  Please let me know if you have any questions or concerns. Thanks!

## 2017-10-16 NOTE — TELEPHONE ENCOUNTER
Left brief non-descript voice mail that I apologize for wait and labs were sent out today in the mail  Labs non-urgent and she should call if she has questions after reviewing the labs

## 2018-01-13 DIAGNOSIS — F41.9 ANXIETY: ICD-10-CM

## 2018-01-13 NOTE — TELEPHONE ENCOUNTER
ALPRAZolam (XANAX) 0.5 MG tablet      Last Written Prescription Date:  10/2/17  Last Fill Quantity: 90 tablet,   # refills: 0  Last Office Visit: 10/2/17 Keith  Future Office visit:       Routing refill request to provider for review/approval because:  Drug not on the [facility], [facility] or [facility] refill protocol or controlled substance

## 2018-01-15 RX ORDER — ALPRAZOLAM 0.5 MG
0.5 TABLET ORAL 3 TIMES DAILY PRN
Qty: 90 TABLET | Refills: 0 | Status: SHIPPED | OUTPATIENT
Start: 2018-01-15 | End: 2018-06-05

## 2018-04-16 DIAGNOSIS — K21.9 GASTROESOPHAGEAL REFLUX DISEASE WITHOUT ESOPHAGITIS: ICD-10-CM

## 2018-04-16 NOTE — TELEPHONE ENCOUNTER
"Omeprazole 20 MG    Last Written Prescription Date:  07/20/17  Last Fill Quantity: 90 capsules,  # refills: 2   Last office visit: 10/2/2017 with prescribing provider:  Keith   Future Office Visit:  Unknown    Requested Prescriptions   Pending Prescriptions Disp Refills     omeprazole (PRILOSEC) 20 MG CR capsule [Pharmacy Med Name: OMEPRAZOLE CAPS 20MG] 90 capsule 2     Sig: TAKE 1 CAPSULE DAILY    PPI Protocol Passed    4/16/2018 12:02 AM       Passed - Not on Clopidogrel (unless Pantoprazole ordered)       Passed - No diagnosis of osteoporosis on record       Passed - Recent (12 mo) or future (30 days) visit within the authorizing provider's specialty    Patient had office visit in the last 12 months or has a visit in the next 30 days with authorizing provider or within the authorizing provider's specialty.  See \"Patient Info\" tab in inbasket, or \"Choose Columns\" in Meds & Orders section of the refill encounter.           Passed - Patient is age 18 or older       Passed - No active pregnacy on record       Passed - No positive pregnancy test in past 12 months          "

## 2018-04-16 NOTE — TELEPHONE ENCOUNTER
Prescription approved per [facility] Refill Protocol.  China HUGHES RN,BSN

## 2018-06-04 ENCOUNTER — TELEPHONE (OUTPATIENT)
Dept: FAMILY MEDICINE | Facility: CLINIC | Age: 58
End: 2018-06-04

## 2018-06-04 DIAGNOSIS — F41.9 ANXIETY: ICD-10-CM

## 2018-06-04 NOTE — TELEPHONE ENCOUNTER
Reason for Call:  Other prescription    Detailed comments: Pt would like to get a refill for ALPRAZolam (XANAX) 0.5 MG tablet   Please send it to [facility] - [place], [place] - [address] [place] [address]    Phone Number Patient can be reached at: Home number on file [phone] (home)    Best Time: Any time    Can we leave a detailed message on this number? YES    Call taken on 6/4/2018 at 4:46 PM by Hortencia Holden

## 2018-06-05 RX ORDER — ALPRAZOLAM 0.5 MG
0.5 TABLET ORAL 3 TIMES DAILY PRN
Qty: 30 TABLET | Refills: 0 | Status: SHIPPED | OUTPATIENT
Start: 2018-06-05 | End: 2018-06-21

## 2018-06-05 NOTE — TELEPHONE ENCOUNTER
rx faxed to      [facility] - [place], [place] - [address] [facility]

## 2018-06-05 NOTE — TELEPHONE ENCOUNTER
Rx in outgoing fax bin  Please let her know that I had requested 6 month anxiety follow up when I saw her last fall and so I only Rx for #30 rather than #90 at this time

## 2018-06-05 NOTE — TELEPHONE ENCOUNTER
ALPRAZolam (XANAX) 0.5 MG tablet 90 tablet 0 1/15/2018           Last Written Prescription Date:  1/15/18  Last Fill Quantity: 90,   # refills: 0  Last Office Visit: 10/2/17  Future Office visit: none      Routing refill request to provider for review/approval because:  Drug not on the [facility], [facility] or [facility] refill protocol or controlled substance

## 2018-06-21 RX ORDER — ALPRAZOLAM 0.5 MG
0.5 TABLET ORAL 3 TIMES DAILY PRN
Qty: 45 TABLET | Refills: 0 | Status: SHIPPED | OUTPATIENT
Start: 2018-06-28 | End: 2018-06-22

## 2018-06-21 NOTE — TELEPHONE ENCOUNTER
I agree, based on prior fills thankfully she doesn't take 3 per day as her prior PCP had allowed for    Appropriate for only quantity #45 and have her move up appointment (ok for block) if she doesn't think she can have the 45 last her til her August appointment    I'll also date this for about a week from now since she has 9 pills left    Rx in outgoing fax bin on 5th floor

## 2018-06-21 NOTE — TELEPHONE ENCOUNTER
Pt calling back, appt is scheduled for 8/8/18 w/Dr. Parker.  This was the earliest date available.  Patient has 9 pills left which would be 3 days worth of medication.  Needs refill until appt in Aug.  Please refill script.  Radha can be reached [phone].      Thank you!
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
## 2018-06-22 RX ORDER — ALPRAZOLAM 0.5 MG
0.5 TABLET ORAL 3 TIMES DAILY PRN
Qty: 60 TABLET | Refills: 0 | Status: SHIPPED | OUTPATIENT
Start: 2018-06-25 | End: 2018-07-18

## 2018-06-22 RX ORDER — ALPRAZOLAM 0.5 MG
0.5 TABLET ORAL 3 TIMES DAILY PRN
Qty: 60 TABLET | Refills: 0 | Status: CANCELLED | OUTPATIENT
Start: 2018-06-25

## 2018-06-22 NOTE — TELEPHONE ENCOUNTER
Ok, I'll print new Rx and place in outgoing fax box on 5th floor. Please update her and sorry for her loss. Please keep appointment with me in July as scheduled. Can cancel Aug appointment.

## 2018-06-22 NOTE — TELEPHONE ENCOUNTER
Ok, yes, I can addend the Rx. Team, please see if we still have the fax as it is no longer in the outgoing fax box on 5th floor. Routing to TCs and Triage.    If it has already been faxed to pharmacy can we call to give a verbal increase in the quantity to #60 starting on Monday, June 25th? This will definitely last her until her appointment in early July.

## 2018-06-22 NOTE — TELEPHONE ENCOUNTER
Called patient and left VM that new RX is being sent to pharmacy for a quantity of #60, starting on 6/25. (Did not leave detail of name of RX, but referring to RX in discussion the last few days)    Asked to call back with any further questions/concerns and we will be happy to see her on 7/9.     Ivonne CASTELLANO RN

## 2018-07-03 ENCOUNTER — TELEPHONE (OUTPATIENT)
Dept: FAMILY MEDICINE | Facility: CLINIC | Age: 58
End: 2018-07-03

## 2018-07-03 DIAGNOSIS — F41.9 ANXIETY: ICD-10-CM

## 2018-07-03 RX ORDER — ALPRAZOLAM 0.5 MG
0.5 TABLET ORAL 3 TIMES DAILY PRN
Qty: 60 TABLET | Refills: 0 | Status: CANCELLED | OUTPATIENT
Start: 2018-07-03

## 2018-07-03 NOTE — TELEPHONE ENCOUNTER
Pending Prescriptions:                       Disp   Refills    ALPRAZolam (XANAX) 0.5 MG tablet          60 tab*0            Sig: Take 1 tablet (0.5 mg) by mouth 3 times daily as           needed          Last Written Prescription Date:  06/25/2018  Last Fill Quantity: 60,   # refills: 0  Last Office Visit: 10/02/2017  Future Office visit:    Next 5 appointments (look out 90 days)     Aug 22, 2018 11:00 AM CDT   [facility]    [address] [facility] [address]   [phone]                   Routing refill request to provider for review/approval because:  Drug not on the [facility], [facility] or [facility] refill protocol or controlled substance

## 2018-07-03 NOTE — TELEPHONE ENCOUNTER
Reason for Call:  Medication or medication refill:    Do you use a [facility]?  Name of the pharmacy and phone number for the current request:       [facility] - [place], [place] - [address] [place] [address]      Name of the medication requested: ALPRAZolam (XANAX) 0.5 MG tablet    Other request: Pt was supposed to come in for an OV on 7/9 but had to cancel and is now scheduled on 8/22    Can we leave a detailed message on this number? YES    Phone number patient can be reached at: Home number on file [phone] (home)    Best Time: any    Call taken on 7/3/2018 at 4:39 PM by Roxie Sung

## 2018-07-03 NOTE — TELEPHONE ENCOUNTER
TCs please get her scheduled on a block when she can come. I'm not going to keep filling if she keeps putting off appointments. I know she is anxious and I know she has had recent stressors with loss of a brother but I don't feel comfortable with this high dose benzodiazepine long term. I'll be discussing psychiatry referral with her at upcoming visit for better long-term options.

## 2018-07-18 ENCOUNTER — OFFICE VISIT (OUTPATIENT)
Dept: FAMILY MEDICINE | Facility: CLINIC | Age: 58
End: 2018-07-18
Payer: COMMERCIAL

## 2018-07-18 VITALS
WEIGHT: 154 LBS | OXYGEN SATURATION: 98 % | SYSTOLIC BLOOD PRESSURE: 118 MMHG | HEIGHT: 64 IN | BODY MASS INDEX: 26.29 KG/M2 | DIASTOLIC BLOOD PRESSURE: 85 MMHG | HEART RATE: 85 BPM | TEMPERATURE: 97.5 F

## 2018-07-18 DIAGNOSIS — E78.2 MIXED HYPERLIPIDEMIA: ICD-10-CM

## 2018-07-18 DIAGNOSIS — F33.9 EPISODE OF RECURRENT MAJOR DEPRESSIVE DISORDER, UNSPECIFIED DEPRESSION EPISODE SEVERITY (H): ICD-10-CM

## 2018-07-18 DIAGNOSIS — R79.89 ABNORMAL TSH: ICD-10-CM

## 2018-07-18 DIAGNOSIS — Z72.0 TOBACCO USE: ICD-10-CM

## 2018-07-18 DIAGNOSIS — R74.01 ELEVATED AST (SGOT): ICD-10-CM

## 2018-07-18 DIAGNOSIS — Z79.899 CONTROLLED SUBSTANCE AGREEMENT SIGNED: ICD-10-CM

## 2018-07-18 DIAGNOSIS — Z78.9 ALCOHOL USE: ICD-10-CM

## 2018-07-18 DIAGNOSIS — F41.9 ANXIETY: Primary | ICD-10-CM

## 2018-07-18 LAB
ALBUMIN SERPL-MCNC: 3.6 G/DL (ref 3.4–5)
ALP SERPL-CCNC: 55 U/L (ref 40–150)
ALT SERPL W P-5'-P-CCNC: 25 U/L (ref 0–50)
AST SERPL W P-5'-P-CCNC: 50 U/L (ref 0–45)
BILIRUB DIRECT SERPL-MCNC: 0.2 MG/DL (ref 0–0.2)
BILIRUB SERPL-MCNC: 0.8 MG/DL (ref 0.2–1.3)
PROT SERPL-MCNC: 7.6 G/DL (ref 6.8–8.8)
T4 FREE SERPL-MCNC: 1.04 NG/DL (ref 0.76–1.46)
TSH SERPL DL<=0.005 MIU/L-ACNC: 5.69 MU/L (ref 0.4–4)

## 2018-07-18 PROCEDURE — 36415 COLL VENOUS BLD VENIPUNCTURE: CPT | Performed by: INTERNAL MEDICINE

## 2018-07-18 PROCEDURE — 84439 ASSAY OF FREE THYROXINE: CPT | Performed by: INTERNAL MEDICINE

## 2018-07-18 PROCEDURE — 80076 HEPATIC FUNCTION PANEL: CPT | Performed by: INTERNAL MEDICINE

## 2018-07-18 PROCEDURE — 99214 OFFICE O/P EST MOD 30 MIN: CPT | Performed by: INTERNAL MEDICINE

## 2018-07-18 PROCEDURE — 84443 ASSAY THYROID STIM HORMONE: CPT | Performed by: INTERNAL MEDICINE

## 2018-07-18 RX ORDER — ALPRAZOLAM 0.5 MG
0.5 TABLET ORAL 2 TIMES DAILY PRN
Qty: 60 TABLET | Refills: 2 | Status: SHIPPED | OUTPATIENT
Start: 2018-07-18 | End: 2019-01-01

## 2018-07-18 RX ORDER — PROPRANOLOL HYDROCHLORIDE 20 MG/1
20 TABLET ORAL 2 TIMES DAILY
Qty: 60 TABLET | Refills: 2 | Status: SHIPPED | OUTPATIENT
Start: 2018-07-18 | End: 2018-08-06

## 2018-07-18 RX ORDER — ROSUVASTATIN CALCIUM 40 MG/1
20 TABLET, COATED ORAL DAILY
Qty: 45 TABLET | Refills: 1 | Status: SHIPPED | OUTPATIENT
Start: 2018-07-18 | End: 2018-11-04

## 2018-07-18 ASSESSMENT — ANXIETY QUESTIONNAIRES
3. WORRYING TOO MUCH ABOUT DIFFERENT THINGS: NEARLY EVERY DAY
1. FEELING NERVOUS, ANXIOUS, OR ON EDGE: NEARLY EVERY DAY
6. BECOMING EASILY ANNOYED OR IRRITABLE: SEVERAL DAYS
7. FEELING AFRAID AS IF SOMETHING AWFUL MIGHT HAPPEN: NEARLY EVERY DAY
2. NOT BEING ABLE TO STOP OR CONTROL WORRYING: NEARLY EVERY DAY
5. BEING SO RESTLESS THAT IT IS HARD TO SIT STILL: NOT AT ALL
IF YOU CHECKED OFF ANY PROBLEMS ON THIS QUESTIONNAIRE, HOW DIFFICULT HAVE THESE PROBLEMS MADE IT FOR YOU TO DO YOUR WORK, TAKE CARE OF THINGS AT HOME, OR GET ALONG WITH OTHER PEOPLE: SOMEWHAT DIFFICULT
GAD7 TOTAL SCORE: 14

## 2018-07-18 ASSESSMENT — PATIENT HEALTH QUESTIONNAIRE - PHQ9: 5. POOR APPETITE OR OVEREATING: SEVERAL DAYS

## 2018-07-18 NOTE — LETTER
My Depression Action Plan  Name: Radha Turcios   Date of Birth 1960  Date: 7/18/2018    My doctor: Azul Parker   My clinic: [place] [address] [facility] [address]  [phone]          GREEN    ZONE   Good Control    What it looks like:     Things are going generally well. You have normal up s and down s. You may even feel depressed from time to time, but bad moods usually last less than a day.   What you need to do:  1. Continue to care for yourself (see self care plan)  2. Check your depression survival kit and update it as needed  3. Follow your physician s recommendations including any medication.  4. Do not stop taking medication unless you consult with your physician first.           YELLOW         ZONE Getting Worse    What it looks like:     Depression is starting to interfere with your life.     It may be hard to get out of bed; you may be starting to isolate yourself from others.    Symptoms of depression are starting to last most all day and this has happened for several days.     You may have suicidal thoughts but they are not constant.   What you need to do:     1. Call your care team, your response to treatment will improve if you keep your care team informed of your progress. Yellow periods are signs an adjustment may need to be made.     2. Continue your self-care, even if you have to fake it!    3. Talk to someone in your support network    4. Open up your depression survival kit           RED    ZONE Medical Alert - Get Help    What it looks like:     Depression is seriously interfering with your life.     You may experience these or other symptoms: You can t get out of bed most days, can t work or engage in other necessary activities, you have trouble taking care of basic hygiene, or basic responsibilities, thoughts of suicide or death that will not go away, self-injurious behavior.     What you need to do:  1. Call your care team and  request a same-day appointment. If they are not available (weekends or after hours) call your local crisis line, emergency room or 911.            Depression Self Care Plan / Survival Kit    Self-Care for Depression  Here s the deal. Your body and mind are really not as separate as most people think.  What you do and think affects how you feel and how you feel influences what you do and think. This means if you do things that people who feel good do, it will help you feel better.  Sometimes this is all it takes.  There is also a place for medication and therapy depending on how severe your depression is, so be sure to consult with your medical provider and/ or Behavioral Health Consultant if your symptoms are worsening or not improving.     In order to better manage my stress, I will:    Exercise  Get some form of exercise, every day. This will help reduce pain and release endorphins, the  feel good  chemicals in your brain. This is almost as good as taking antidepressants!  This is not the same as joining a gym and then never going! (they count on that by the way ) It can be as simple as just going for a walk or doing some gardening, anything that will get you moving.      Hygiene   Maintain good hygiene (Get out of bed in the morning, Make your bed, Brush your teeth, Take a shower, and Get dressed like you were going to work, even if you are unemployed).  If your clothes don't fit try to get ones that do.    Diet  I will strive to eat foods that are good for me, drink plenty of water, and avoid excessive sugar, caffeine, alcohol, and other mood-altering substances.  Some foods that are helpful in depression are: complex carbohydrates, B vitamins, flaxseed, fish or fish oil, fresh fruits and vegetables.    Psychotherapy  I agree to participate in Individual Therapy (if recommended).    Medication  If prescribed medications, I agree to take them.  Missing doses can result in serious side effects.  I understand that  drinking alcohol, or other illicit drug use, may cause potential side effects.  I will not stop my medication abruptly without first discussing it with my provider.    Staying Connected With Others  I will stay in touch with my friends, family members, and my primary care provider/team.    Use your imagination  Be creative.  We all have a creative side; it doesn t matter if it s oil painting, sand castles, or mud pies! This will also kick up the endorphins.    Witness Beauty  (AKA stop and smell the roses) Take a look outside, even in mid-winter. Notice colors, textures. Watch the squirrels and birds.     Service to others  Be of service to others.  There is always someone else in need.  By helping others we can  get out of ourselves  and remember the really important things.  This also provides opportunities for practicing all the other parts of the program.    Humor  Laugh and be silly!  Adjust your TV habits for less news and crime-drama and more comedy.    Control your stress  Try breathing deep, massage therapy, biofeedback, and meditation. Find time to relax each day.     My support system    Clinic Contact:  Phone number:    Contact 1:  Phone number:    Contact 2:  Phone number:    Mandaen/:  Phone number:    Therapist:  Phone number:    Local crisis center:    Phone number:    Other community support:  Phone number:

## 2018-07-18 NOTE — LETTER
[facility]  [address] [facility]  [address]  [place]  [address]  Tel: [phone]    July 23, 2018    Radha Turcios  [address]   [facility] [address]      Radha,     Thank you for coming into the clinic this week - I know it's hard for you! Your labs are stable compared to last time. The thyroid labs are satisfactory. The one liver test (AST) is still elevated so please cut back on alcohol as we discussed to improve this.     Please continue with the plan of care as we discussed and let me know if you have any questions/concerns. Thanks!                  Sincerely,    Azul Parker,           Enclosure: Lab Results

## 2018-07-18 NOTE — LETTER
[facility]    07/18/18    Patient: Radha Turcios  YOB: 1960  Medical Record Number: [id number]                                                                  Controlled Substance Agreement  I understand that my care provider has prescribed controlled substances (narcotics, tranquilizers, and/or stimulants) to help manage my condition(s).  I am taking this medicine to help me function or work.  I know that this is strong medicine.  It could have serious side effects and even cause a dependency on the drug.  If I stop these medicines suddenly, I could have severe withdrawal symptoms.    The risks, benefits, and side effects of these medication(s) were explained to me.  I agree that:  1. I will take part in other treatments as advised by my provider.  This may be psychiatry or counseling, physical therapy, behavioral therapy, group treatment, or a referral to a pain clinic.  I will reduce or stop my medicine when my provider tells me to do so.   2. I will take my medicines as prescribed.  I will not change the dose or schedule unless my provider tells me to.  There will be no refills if I  run out early.   I may be contacted at any time without warning and asked to complete a drug test or pill count.   3. I will keep all my appointments at the clinic.  If I miss appointments or fail to follow instructions, my provider may stop my medicine.  4. I will not ask other providers to prescribe controlled substances. And I will not accept controlled substances from other people. If I need another prescribed controlled substance for a new reason, I will notify my provider within one business day.  5. If I enroll in the [place] Medical Marijuana program, I will tell my provider.  I will also sign an agreement to share my medical records with my provider.  6. I will use one pharmacy to fill all of my controlled substance prescriptions.  If my prescription is mailed to my pharmacy, it may  take 5 to 7 days for my medicine to be ready.  7. I understand that my provider, clinic care team, and pharmacy can track controlled substance prescriptions from other providers through a central database (prescription monitoring program).  8. I will bring in my list of medications (or my medicine bottles) each time I come to the clinic.  [id number] REV-  07/2018                                                                                                                                   Page 1 of 2      [facility]    07/18/18    Patient: Radha Turcios  YOB: 1960  Medical Record Number: [id number]    9. Refills of controlled substances will be made only during office hours.  It is up to me to make sure that I do not run out of my medicines on weekends or holidays.    10. I am responsible for my prescriptions.  If the medicine/prescription is lost or stolen, it will not be replaced.   I also agree not to share these medicines with anyone.  11. I agree to not use ANY illegal or recreational drugs.  This includes marijuana, cocaine, bath salts or other drugs.  I agree not to use alcohol unless my provider says I may.  I agree to give urine samples whenever asked.  If I fail to give a urine sample, the provider may stop my medicine.     12. I will tell my nurse or provider right away if I become pregnant or have a new medical problem treated outside of [facility].  13. I understand that this medicine can affect my thinking and judgment.  It may be unsafe for me to drive, use machinery and do dangerous tasks.  I will not do any of these things until I know how the medicine affects me.  If my dose changes, I will wait to see how it affects me.  I will contact my provider if I have concerns about medicine side effects.  I understand that if I do not follow any of the conditions above, my prescriptions or treatment may be stopped.    I agree that my provider, clinic care team, and  pharmacy may work with any city, state or federal law enforcement agency that investigates the misuse, sale, or other diversion of my controlled medicine. I will allow my provider to discuss my care with or share a copy of this agreement with any other treating provider, pharmacy or emergency room where I receive care.  I agree to give up (waive) any right of privacy or confidentiality with respect to these authorizations.   I have read this agreement and have asked questions about anything I did not understand.   ___________________________________    ___________________________  Patient Signature                                                           Date and Time  ___________________________________     ____________________________  Witness                                                                            Date and Time  ___________________________________  Azul Parker DO  [id number] REV-  07/2018                                                                                                                                                   Page 2 of 2

## 2018-07-18 NOTE — PATIENT INSTRUCTIONS
"Printed prescription for Xanax given to you - please only take as needed for anxiety up to two times per day. Each prescription to last a month.    To further help with your anxiety while the Xanax dose is reduced slightly, there is a new medication I prescribed called \"Propranolol\" for you to take two times per day (morning and night) to help with making you safely feel more calm.    Continue your other medications as prescribed.    Labs today.    Since you have declined following up with a psychiatrist or counselor at this time, then I'd like you to follow up with me in clinic to monitor your anxiety in 3 months given the changes we've made today.    Please call us with questions at any time.  "

## 2018-07-18 NOTE — MR AVS SNAPSHOT
"              After Visit Summary   7/18/2018    Radha Turcios    MRN: [id number]           Patient Information     Date Of Birth          1960        Visit Information        Provider Department      7/18/2018 10:00 AM Azul Parker DO [facility]        Today's Diagnoses     Anxiety    -  1    Abnormal TSH        Elevated AST (SGOT)        Mixed hyperlipidemia        Tobacco use        Mixed hyperlipidemia          Care Instructions    Printed prescription for Xanax given to you - please only take as needed for anxiety up to two times per day. Each prescription to last a month.    To further help with your anxiety while the Xanax dose is reduced slightly, there is a new medication I prescribed called \"Propranolol\" for you to take two times per day (morning and night) to help with making you safely feel more calm.    Continue your other medications as prescribed.    Labs today.    Since you have declined following up with a psychiatrist or counselor at this time, then I'd like you to follow up with me in clinic to monitor your anxiety in 3 months given the changes we've made today.    Please call us with questions at any time.          Follow-ups after your visit        Your next 10 appointments already scheduled     Aug 22, 2018 11:00 AM CDT   Office Visit with Azul Parker DO   [facility] ([facility]    [address] [facility] [address] [phone]           Bring a current list of meds and any records pertaining to this visit. For Physicals, please bring immunization records and any forms needing to be filled out. Please arrive 10 minutes early to complete paperwork.              Who to contact     If you have questions or need follow up information about today's clinic visit or your schedule please contact [facility] directly at [phone].  Normal or non-critical lab and imaging results will be communicated to you by Mark, " "letter or phone within 4 business days after the clinic has received the results. If you do not hear from us within 7 days, please contact the clinic through [employer] or phone. If you have a critical or abnormal lab result, we will notify you by phone as soon as possible.  Submit refill requests through [employer] or call your pharmacy and they will forward the refill request to us. Please allow 3 business days for your refill to be completed.          Additional Information About Your Visit        Care EveryWhere ID     This is your Care EveryWhere ID. This could be used by other organizations to access your [place] medical records  [id number]        Your Vitals Were     Pulse Temperature Height Last Period Pulse Oximetry Breastfeeding?    85 97.5  F (36.4  C) (Oral) 5' 4.25\" (1.632 m) 09/14/2001 98% No    BMI (Body Mass Index)                   26.23 kg/m2            Blood Pressure from Last 3 Encounters:   07/18/18 118/85   10/02/17 131/86   01/16/17 117/90    Weight from Last 3 Encounters:   07/18/18 154 lb (69.9 kg)   10/02/17 150 lb 1.6 oz (68.1 kg)   01/16/17 156 lb 14.4 oz (71.2 kg)              We Performed the Following     Hepatic panel (Albumin, ALT, AST, Bili, Alk Phos, TP)     TSH with free T4 reflex          Today's Medication Changes          These changes are accurate as of 7/18/18 10:47 AM.  If you have any questions, ask your nurse or doctor.               Start taking these medicines.        Dose/Directions    propranolol 20 MG tablet   Commonly known as:  INDERAL   Used for:  Anxiety   Started by:  Azul Parker DO        Dose:  20 mg   Take 1 tablet (20 mg) by mouth 2 times daily   Quantity:  60 tablet   Refills:  2         These medicines have changed or have updated prescriptions.        Dose/Directions    ALPRAZolam 0.5 MG tablet   Commonly known as:  XANAX   This may have changed:    - when to take this  - additional instructions   Used for:  Anxiety   Changed by:  Azul Parker DO "        Dose:  0.5 mg   Take 1 tablet (0.5 mg) by mouth 2 times daily as needed Prescription to last one month   Quantity:  60 tablet   Refills:  2       rosuvastatin 40 MG tablet   Commonly known as:  CRESTOR   This may have changed:  how much to take   Used for:  Mixed hyperlipidemia   Changed by:  Azul Parker DO        Dose:  20 mg   Take 0.5 tablets (20 mg) by mouth daily   Quantity:  45 tablet   Refills:  1            Where to get your medicines      These medications were sent to [facility] - [address]  [address] [place] [address]     Phone:  [phone]     rosuvastatin 40 MG tablet         These medications were sent to [facility] [address] [place] [address] AT [facility] [address]  [address] [place] [address] E, [facility] [address]     Phone:  [phone]     propranolol 20 MG tablet         Some of these will need a paper prescription and others can be bought over the counter.  Ask your nurse if you have questions.     Bring a paper prescription for each of these medications     ALPRAZolam 0.5 MG tablet                Primary Care Provider Office Phone # Fax #    Azul Parker [phone] [address] [address]        Equal Access to Services     PHILLIP CARRASCO AH: Hadii aad ku hadasho Soomaali, waaxda luqadaha, qaybta kaalmada adeegyada, waxay idiin hayaan kathy lauren. So [facility] [phone].    ATENCIÓN: Si habla español, tiene a rose disposición servicios gratuitos de asistencia lingüística. Llame al [phone].    We comply with applicable federal civil rights laws and [place] laws. We do not discriminate on the basis of race, color, national origin, age, disability, sex, sexual orientation, or gender identity.            Thank you!     Thank you for choosing [facility]  for your care. Our goal is always to provide you with excellent care. Hearing  back from our patients is one way we can continue to improve our services. Please take a few minutes to complete the written survey that you may receive in the mail after your visit with us. Thank you!             Your Updated Medication List - Protect others around you: Learn how to safely use, store and throw away your medicines at www.disposemymeds.org.          This list is accurate as of 7/18/18 10:47 AM.  Always use your most recent med list.                   Brand Name Dispense Instructions for use Diagnosis    ALPRAZolam 0.5 MG tablet    XANAX    60 tablet    Take 1 tablet (0.5 mg) by mouth 2 times daily as needed Prescription to last one month    Anxiety       amitriptyline 25 MG tablet    ELAVIL    180 tablet    Take 1-2 tablets (25-50 mg) by mouth At Bedtime    Episode of recurrent major depressive disorder, unspecified depression episode severity (H), Anxiety       B-12 1000 MCG Tbcr     100 tablet    Take 1,000 mcg by mouth daily    Vitamin B12 deficiency (non anemic)       fenofibrate 145 MG tablet     90 tablet    TAKE 1 TABLET DAILY    Mixed hyperlipidemia       FISH OIL CONCENTRATE 1000 MG Caps           omeprazole 20 MG CR capsule    priLOSEC    90 capsule    TAKE 1 CAPSULE DAILY    Gastroesophageal reflux disease without esophagitis       propranolol 20 MG tablet    INDERAL    60 tablet    Take 1 tablet (20 mg) by mouth 2 times daily    Anxiety       rosuvastatin 40 MG tablet    CRESTOR    45 tablet    Take 0.5 tablets (20 mg) by mouth daily    Mixed hyperlipidemia       valACYclovir 500 MG tablet    VALTREX    8 tablet    TAKE 4 TABLETS ONCE, REPEAT IN 12 HOURS AS NEEDED    Cold sore       vilazodone 40 MG Tabs tablet    VIIBRYD    90 tablet    Take 1 tablet (40 mg) by mouth daily    Anxiety, Episode of recurrent major depressive disorder, unspecified depression episode severity (H)       VITAMIN D PO

## 2018-07-19 ASSESSMENT — PATIENT HEALTH QUESTIONNAIRE - PHQ9: SUM OF ALL RESPONSES TO PHQ QUESTIONS 1-9: 5

## 2018-07-19 ASSESSMENT — ANXIETY QUESTIONNAIRES: GAD7 TOTAL SCORE: 14

## 2018-07-29 PROBLEM — F33.9 EPISODE OF RECURRENT MAJOR DEPRESSIVE DISORDER, UNSPECIFIED DEPRESSION EPISODE SEVERITY (H): Status: ACTIVE | Noted: 2018-07-29

## 2018-07-29 PROBLEM — R03.0 ELEVATED BLOOD PRESSURE READING WITHOUT DIAGNOSIS OF HYPERTENSION: Status: RESOLVED | Noted: 2017-02-01 | Resolved: 2018-07-29

## 2018-08-03 ENCOUNTER — TELEPHONE (OUTPATIENT)
Dept: FAMILY MEDICINE | Facility: CLINIC | Age: 58
End: 2018-08-03

## 2018-08-03 DIAGNOSIS — F41.9 ANXIETY: Primary | Chronic | ICD-10-CM

## 2018-08-03 NOTE — TELEPHONE ENCOUNTER
Reason for Call:  Medication Reaction    Detailed comments: PT calling regarding propranolol (INDERAL) 20 MG tablet sates she was taking for Anxiety ( states took for about 1 week) and was having reactions to it. She believes it was giving her 100x more anxiety and was having panic attacks . Pt discontinued Rx yesterday ( 8/2) states she did not take today and feels more calm    Phone Number Patient can be reached at: Home number on file [phone] (home)    Best Time: any    Can we leave a detailed message on this number? YES    Call taken on 8/3/2018 at 4:39 PM by Roxie Sung

## 2018-08-06 RX ORDER — HYDROXYZINE HYDROCHLORIDE 25 MG/1
25 TABLET, FILM COATED ORAL EVERY 8 HOURS PRN
Qty: 60 TABLET | Refills: 0 | Status: SHIPPED | OUTPATIENT
Start: 2018-08-06 | End: 2018-10-10

## 2018-08-06 NOTE — TELEPHONE ENCOUNTER
Please thank her for calling - I know she is quite anxious. Since she does not appear to want to take Propranolol scheduled, would she like to take PRN? If not, would advise Hydroxyzine PRN if needed as an adjunct and I could Rx that. We're trying to find other meds to help her wean off of Alprazolam (or at least use less of it). Please remind her to refrain from alcohol. Please let her know that I'll still expect her quantity of Alprazolam of #60 to last a whole month as we discussed at her recent visit. You can offer psychiatry and/or counseling referral again too for guidance but she declined it at our recent office visit.

## 2018-08-06 NOTE — TELEPHONE ENCOUNTER
Noted, yes, has horrific family history which is tragic.  Can only continue to try to offer counseling,etc.  Hydroxyzine sent in for PRN use as adjunct to Xanax.

## 2018-08-06 NOTE — TELEPHONE ENCOUNTER
"Patient returned call.     Shared information and advise from Dr Parker.   1. Patient refusing to try Propranolol as needed, however did agree to try Hydroxyzine as needed as adjunct.     [facility] - [place], [place] - [address] AT [facility] [address]    2. Reminded patient that current quantity of Alprazolam of #60 to last the whole month.  Patient stated understanding and agreement.     3.  Then asked if has given any more thought and consideration to psychiatry or counseling.  Patient began to cry, saying she does not want to pursue that.  States her youngest sister was DX'd yesterday with \"stage 4 cancer\".  Says she's \"lost 7 brothers and sisters already.\"  Says she \"just can't have any counseling right now.\".      China UHGHES RN,BSN         "

## 2018-08-09 NOTE — TELEPHONE ENCOUNTER
"Spoke with patient     (Was going through old calls, it appears this was clicked on by triage but addressed, was left in basket)    Pt was crying over the phone   States not tolerating Hydroxyzine well - just makes her feel very panicky/nervous, lightheaded, increases anxiety   States side effects last throughout the day  Has been on \"so many\" different medications in the past - has had difficulty tolerating many of these   Has tried 5 doses and all make her feel worse   States Alprazolam is the only thing that helps her relax and avoid panic/anxiety attacks - \"feels like will jump out of my skin\"    Please advise   Hortencia LATIF RN     "

## 2018-08-10 NOTE — TELEPHONE ENCOUNTER
Left message asking patient to call back to inform of below message from PCP    Hortencia LATIF RN

## 2018-08-10 NOTE — TELEPHONE ENCOUNTER
She is just terribly anxious at baseline and refused my offer for help from counselors/psychiatrists. I'm not going to increase her Xanax. She can keep using Xanax as prescribed and decide whether or not she wants to keep trying Hydroxyzine as an adjunct to her Xanax. If she changes her mind and wants to see a psychiatrist then let me know. Thanks!

## 2018-08-21 ENCOUNTER — NURSE TRIAGE (OUTPATIENT)
Dept: NURSING | Facility: CLINIC | Age: 58
End: 2018-08-21

## 2018-08-21 ENCOUNTER — TELEPHONE (OUTPATIENT)
Dept: FAMILY MEDICINE | Facility: CLINIC | Age: 58
End: 2018-08-21

## 2018-08-21 NOTE — LETTER
[facility]  [address] [facility]  [address]  [place]  [address]  Tel: [phone]    August 29, 2018    Radha Turcios  [address]   [facility] [address]        Dear Ms. Andradenemono Karolina,    We have attempted to call you a few times and have been unable to reach you.  Please call the office to reschedule your appointment as soon as possible.  Thank you.    If you have any further questions or problems, please contact our office.      Sincerely,    Azul Parker DO / jennifer

## 2018-08-22 ENCOUNTER — NURSE TRIAGE (OUTPATIENT)
Dept: NURSING | Facility: CLINIC | Age: 58
End: 2018-08-22

## 2018-08-22 NOTE — TELEPHONE ENCOUNTER
Patient called [facility] to leave message for PCP regarding her current anti-anxiety medication concerns. Please see details in encounter dated today.    Charleen Beckwith RN  [facility]

## 2018-08-22 NOTE — TELEPHONE ENCOUNTER
"Radha requesting a message be sent to PCP about her anti-anxiety medication concerns. [facility] nurse to send PCP a message for caller via [employer] for follow up. Radha declined triage or ER care at this time. Denies thoughts/plan of self harm.    Encouraged call back to [facility] 24/7 for nurse line services, new/worsening symptoms or further questions.    Charleen Beckwith RN  [facility]      Reason for Disposition    Caller has NON-URGENT medication question about med that PCP prescribed and triager unable to answer question     Radha requesting a message be sent to PCP about her anti-anxiety medication concerns    Additional Information    Negative: Drug overdose and nurse unable to answer question    Negative: Caller requesting information not related to medicine    Negative: Caller requesting a prescription for Strep throat and has a positive culture result    Negative: Rash while taking a medication or within 3 days of stopping it    Negative: Immunization reaction suspected    Negative: [1] Asthma and [2] having symptoms of asthma (cough, wheezing, etc)    Negative: MORE THAN A DOUBLE DOSE of a prescription or over-the-counter (OTC) drug    Negative: [1] DOUBLE DOSE (an extra dose or lesser amount) of over-the-counter (OTC) drug AND [2] any symptoms (e.g., dizziness, nausea, pain, sleepiness)    Negative: [1] DOUBLE DOSE (an extra dose or lesser amount) of prescription drug AND [2] any symptoms (e.g., dizziness, nausea, pain, sleepiness)    Negative: Took another person's prescription drug    Negative: [1] DOUBLE DOSE (an extra dose or lesser amount) of prescription drug AND [2] NO symptoms (Exception: a double dose of antibiotics)    Negative: Diabetes drug error or overdose (e.g., insulin or extra dose)    Negative: [1] Request for URGENT new prescription or refill of \"essential\" medication (i.e., likelihood of harm to patient if not taken) AND [2] triager unable to fill per unit policy    Negative: [1] " Prescription not at pharmacy AND [2] was prescribed today by PCP    Negative: Pharmacy calling with prescription questions and triager unable to answer question    Negative: Caller has URGENT medication question about med that PCP prescribed and triager unable to answer question    Protocols used: MEDICATION QUESTION CALL-ADULT-AH

## 2018-08-22 NOTE — TELEPHONE ENCOUNTER
Needs to make appointment in next     Reason for Disposition    Patient sounds very upset or troubled to the triager    Additional Information    Negative: Severe difficulty breathing (e.g., struggling for each breath, speaks in single words)    Negative: Bluish lips, tongue, or face now    Negative: Difficult to awaken or acting confused  (e.g., disoriented, slurred speech)    Negative: Hysterical or combative behavior    Negative: Sounds like a life-threatening emergency to the triager    Negative: [1] Difficulty breathing AND [2] persists > 10 minutes AND [3] not relieved by reassurance provided by triager    Negative: [1] Lightheadedness or dizziness AND [2] persists > 10 minutes AND [3] not relieved by reassurance provided by triager    Negative: Taking medication containing theophylline (e.g., Theodur)    Negative: [1] Known or suspected alcohol or drug abuse AND [2] feeling very shaky (i.e., visible tremors of hands)    Negative: Patient sounds very sick or weak to the triager    Protocols used: ANXIETY AND PANIC ATTACK-ADULT-

## 2018-09-02 DIAGNOSIS — E78.2 MIXED HYPERLIPIDEMIA: ICD-10-CM

## 2018-09-04 NOTE — TELEPHONE ENCOUNTER
"Last Written Prescription Date:  9/05/17  Last Fill Quantity: 90 tablet,  # refills: 3   Last office visit: 7/18/2018 with prescribing provider:  Keith   Future Office Visit:   Next 5 appointments (look out 90 days)     Oct 10, 2018 11:00 AM CDT   PHYSICAL with Azul Parker,    [facility] ([facility])    [address] [facility] [id number] [phone]                 Requested Prescriptions   Pending Prescriptions Disp Refills     fenofibrate 145 MG tablet [Pharmacy Med Name: FENOFIBRATE XIOMY TABS 145MG] 90 tablet 3     Sig: TAKE 1 TABLET DAILY    Fibrates Passed    9/2/2018 11:42 PM       Passed - Lipid panel on file in past 12 months    Recent Labs   Lab Test  10/02/17   0934   08/07/15   1416   CHOL  90   < >  122   TRIG  179*   < >  173*   HDL  12*   < >  16*   LDL  42   < >  71   NHDL  78   < >   --    VLDL   --    --   35*   CHOLHDLRATIO   --    --   7.6*    < > = values in this interval not displayed.              Passed - No abnormal creatine kinase in past 12 months    No lab results found.            Passed - Recent (12 mo) or future (30 days) visit within the authorizing provider's specialty    Patient had office visit in the last 12 months or has a visit in the next 30 days with authorizing provider or within the authorizing provider's specialty.  See \"Patient Info\" tab in inbasket, or \"Choose Columns\" in Meds & Orders section of the refill encounter.           Passed - Patient is age 18 or older       Passed - No active pregnancy on record       Passed - No positive pregnancy test in past 12 months          "

## 2018-09-05 RX ORDER — FENOFIBRATE 145 MG/1
TABLET, COATED ORAL
Qty: 90 TABLET | Refills: 0 | Status: SHIPPED | OUTPATIENT
Start: 2018-09-05 | End: 2018-10-18

## 2018-09-23 DIAGNOSIS — F41.9 ANXIETY: ICD-10-CM

## 2018-09-23 DIAGNOSIS — F33.9 EPISODE OF RECURRENT MAJOR DEPRESSIVE DISORDER, UNSPECIFIED DEPRESSION EPISODE SEVERITY (H): ICD-10-CM

## 2018-09-24 RX ORDER — VILAZODONE HYDROCHLORIDE 40 MG/1
TABLET ORAL
Qty: 90 TABLET | Refills: 1 | Status: SHIPPED | OUTPATIENT
Start: 2018-09-24 | End: 2019-01-01

## 2018-09-24 NOTE — TELEPHONE ENCOUNTER
"Elavil:   Prescription approved per [facility] Refill Protocol.    Viibryd:  Routing refill request to provider for review/approval because:  Drug not on the [facility] refill protocol     Natalie LOERA, RN    Requested Prescriptions   Pending Prescriptions Disp Refills     amitriptyline (ELAVIL) 25 MG tablet [Pharmacy Med Name: AMITRIPTYLINE TABS 25MG] 180 tablet 3     Sig: TAKE 1 TO 2 TABLETS AT BEDTIME    Tricyclic Agents ( Annual appt and no PHQ9) Passed    9/23/2018 11:42 PM       Passed - Blood Pressure under 140/90 in past 12 mos    BP Readings from Last 3 Encounters:   07/18/18 118/85   10/02/17 131/86   01/16/17 117/90                Passed - Recent (12 mo) or future (30 days) visit within authorizing provider's specialty    Patient had office visit in the last 12 months or has a visit in the next 30 days with authorizing provider or within the authorizing provider's specialty.  See \"Patient Info\" tab in inbasket, or \"Choose Columns\" in Meds & Orders section of the refill encounter.           Passed - Patient is age 18 or older       Passed - Patient is not pregnant       Passed - No positive pregnancy test on record in past 12 mos        VIIBRYD 40 MG TABS tablet [Pharmacy Med Name: VIIBRYD TABS 40MG] 90 tablet 3     Sig: TAKE 1 TABLET DAILY    There is no refill protocol information for this order        Next 5 appointments (look out 90 days)     Oct 10, 2018 11:00 AM CDT   PHYSICAL with Azul Parker,    [facility] ([facility])    [address] [facility] [address] [phone]                  "

## 2018-10-10 ENCOUNTER — HOSPITAL ENCOUNTER (OUTPATIENT)
Dept: MAMMOGRAPHY | Facility: CLINIC | Age: 58
Discharge: HOME | End: 2018-10-10
Attending: INTERNAL MEDICINE | Admitting: INTERNAL MEDICINE
Payer: COMMERCIAL

## 2018-10-10 ENCOUNTER — OFFICE VISIT (OUTPATIENT)
Dept: FAMILY MEDICINE | Facility: CLINIC | Age: 58
End: 2018-10-10
Payer: COMMERCIAL

## 2018-10-10 VITALS
SYSTOLIC BLOOD PRESSURE: 133 MMHG | HEIGHT: 64 IN | TEMPERATURE: 97.7 F | WEIGHT: 154.3 LBS | DIASTOLIC BLOOD PRESSURE: 87 MMHG | HEART RATE: 100 BPM | OXYGEN SATURATION: 100 % | BODY MASS INDEX: 26.34 KG/M2

## 2018-10-10 DIAGNOSIS — F41.9 ANXIETY: ICD-10-CM

## 2018-10-10 DIAGNOSIS — R79.89 ABNORMAL TSH: ICD-10-CM

## 2018-10-10 DIAGNOSIS — Z00.01 ENCOUNTER FOR PREVENTATIVE ADULT HEALTH CARE EXAM WITH ABNORMAL FINDINGS: Primary | ICD-10-CM

## 2018-10-10 DIAGNOSIS — Z12.4 SCREENING FOR CERVICAL CANCER: ICD-10-CM

## 2018-10-10 DIAGNOSIS — F40.01: Chronic | ICD-10-CM

## 2018-10-10 DIAGNOSIS — Z12.31 VISIT FOR SCREENING MAMMOGRAM: ICD-10-CM

## 2018-10-10 DIAGNOSIS — R74.01 ELEVATED AST (SGOT): ICD-10-CM

## 2018-10-10 DIAGNOSIS — F41.9 ANXIETY: Chronic | ICD-10-CM

## 2018-10-10 DIAGNOSIS — Z12.11 SCREEN FOR COLON CANCER: ICD-10-CM

## 2018-10-10 DIAGNOSIS — E78.2 MIXED HYPERLIPIDEMIA: Chronic | ICD-10-CM

## 2018-10-10 DIAGNOSIS — F33.9 EPISODE OF RECURRENT MAJOR DEPRESSIVE DISORDER, UNSPECIFIED DEPRESSION EPISODE SEVERITY (H): ICD-10-CM

## 2018-10-10 LAB
ERYTHROCYTE [DISTWIDTH] IN BLOOD BY AUTOMATED COUNT: 13.6 % (ref 10–15)
HCT VFR BLD AUTO: 38.3 % (ref 35–47)
HGB BLD-MCNC: 13.5 G/DL (ref 11.7–15.7)
MCH RBC QN AUTO: 36.9 PG (ref 26.5–33)
MCHC RBC AUTO-ENTMCNC: 35.2 G/DL (ref 31.5–36.5)
MCV RBC AUTO: 105 FL (ref 78–100)
PLATELET # BLD AUTO: 176 10E9/L (ref 150–450)
RBC # BLD AUTO: 3.66 10E12/L (ref 3.8–5.2)
WBC # BLD AUTO: 6.2 10E9/L (ref 4–11)

## 2018-10-10 PROCEDURE — 77067 SCR MAMMO BI INCL CAD: CPT

## 2018-10-10 PROCEDURE — 87624 HPV HI-RISK TYP POOLED RSLT: CPT | Performed by: INTERNAL MEDICINE

## 2018-10-10 PROCEDURE — 85027 COMPLETE CBC AUTOMATED: CPT | Performed by: INTERNAL MEDICINE

## 2018-10-10 PROCEDURE — 36415 COLL VENOUS BLD VENIPUNCTURE: CPT | Performed by: INTERNAL MEDICINE

## 2018-10-10 PROCEDURE — G0145 SCR C/V CYTO,THINLAYER,RESCR: HCPCS | Performed by: INTERNAL MEDICINE

## 2018-10-10 PROCEDURE — 99396 PREV VISIT EST AGE 40-64: CPT | Performed by: INTERNAL MEDICINE

## 2018-10-10 PROCEDURE — 80053 COMPREHEN METABOLIC PANEL: CPT | Performed by: INTERNAL MEDICINE

## 2018-10-10 PROCEDURE — 80061 LIPID PANEL: CPT | Performed by: INTERNAL MEDICINE

## 2018-10-10 NOTE — LETTER
October 23, 2018    Radha Turcios  [address]   [facility] [address]    Dear Radha,  We are happy to inform you that your PAP smear result from 10/10/18 is normal.  We are now able to do a follow up test on PAP smears. The DNA test is for HPV (Human Papilloma Virus). Cervical cancer is closely linked with certain types of HPV. Your results showed no evidence of high risk HPV.  Therefore we recommend you return in 5 years for your next pap smear and HPV test.  You will still need to return to the clinic every year for an annual exam and other preventive tests.  If you have additional questions regarding this result, please call our registered nurse, Tonie at [phone].  Sincerely,    Azul Parker DO/[facility]

## 2018-10-10 NOTE — LETTER
[facility]  [address] [facility]  [address]  [place]  [address]  Tel: [phone]    October 18, 2018    Radha Turcios  [address]   [facility] [address]        Dear Radha Medina,  It was nice to see you in clinic recently! The liver tests are improved so that is great. Your kidney function (GFR) is normal. Your fasting glucose is normal; thus there is no evidence of diabetes at this time.  Your cholesterol is stable. As mentioned, I suggest you can stop your fenofibrate as it really isn't necessary anymore. Please do continue to take your Crestor and fish oil though as prescribed.   Please continue with the plan of care as we discussed and let me know if you have any questions/concerns. Thanks for letting me be a part of your care!     If you have any further questions or problems, please contact our office.      Sincerely,    Azul Parker DO/ Concepcion Alexandre, RICARDO  Results for orders placed or performed in visit on 10/10/18   Lipid panel reflex to direct LDL Fasting   Result Value Ref Range    Cholesterol 94 <200 mg/dL    Triglycerides 157 (H) <150 mg/dL    HDL Cholesterol 15 (L) >49 mg/dL    LDL Cholesterol Calculated 48 <100 mg/dL    Non HDL Cholesterol 79 <130 mg/dL   CBC with platelets   Result Value Ref Range    WBC 6.2 4.0 - 11.0 10e9/L    RBC Count 3.66 (L) 3.8 - 5.2 10e12/L    Hemoglobin 13.5 11.7 - 15.7 g/dL    Hematocrit 38.3 35.0 - 47.0 %     (H) 78 - 100 fl    MCH 36.9 (H) 26.5 - 33.0 pg    MCHC 35.2 31.5 - 36.5 g/dL    RDW 13.6 10.0 - 15.0 %    Platelet Count 176 150 - 450 10e9/L   Comprehensive metabolic panel   Result Value Ref Range    Sodium 134 133 - 144 mmol/L    Potassium 3.9 3.4 - 5.3 mmol/L    Chloride 101 94 - 109 mmol/L    Carbon Dioxide 22 20 - 32 mmol/L    Anion Gap 11 3 - 14 mmol/L    Glucose 85 70 - 99 mg/dL    Urea Nitrogen 12 7 - 30 mg/dL    Creatinine 0.66 0.52 - 1.04 mg/dL    GFR Estimate >90 >60 mL/min/1.7m2    GFR Estimate If Black  >90 >60 mL/min/1.7m2    Calcium 8.7 8.5 - 10.1 mg/dL    Bilirubin Total 0.6 0.2 - 1.3 mg/dL    Albumin 4.0 3.4 - 5.0 g/dL    Protein Total 7.8 6.8 - 8.8 g/dL    Alkaline Phosphatase 51 40 - 150 U/L    ALT 22 0 - 50 U/L    AST 41 0 - 45 U/L   Pap imaged thin layer screen with HPV - recommended age 30 - 65 years (select HPV order below)   Result Value Ref Range    PAP NIL     Copath Report         Patient Name: ESTUARDO JARRETT  MR#: [id number]  Specimen #: [id number]  Collected: 10/10/2018  Received: 10/11/2018  Reported: 10/12/2018 09:31  Ordering Phy(s): KAREN KUMARI    For improved result formatting, select 'View Enhanced Report Format' under   Linked Documents section.    SPECIMEN/STAIN PROCESS:  Pap imaged thin layer prep screening (Surepath, FocalPoint with guided   screening)       Pap-Cyto x 1, HPV ordered x 1    SOURCE: Cervical, endocervical  ----------------------------------------------------------------   Pap imaged thin layer prep screening (Surepath, FocalPoint with guided   screening)  SPECIMEN ADEQUACY:  Satisfactory for evaluation.  -Transitional zone component could not be determined due to atrophy.    CYTOLOGIC INTERPRETATION:    Negative for intraepithelial lesion or malignancy    Electronically signed out by:  Madeleine BANDA (ASCP)    Processed and screened at [facility],   [facility]    CLINICAL HI STORY:  LMP: 9/14/2001  A previous normal pap  Date of Last Pap: 8/7/2015,    Papanicolaou Test Limitations:  Cervical cytology is a screening test with   limited sensitivity; regular  screening is critical for cancer prevention; Pap tests are primarily   effective for the diagnosis/prevention of  squamous cell carcinoma, not adenocarcinomas or other cancers.    TESTING LAB LOCATION:  [address]  [address] [phone]    COLLECTION SITE:  Client:  [facility]  Location:  [facility] (S)     HPV High Risk Types DNA Cervical   Result Value Ref Range    HPV Source SurePath     HPV 16 DNA Negative NEG^Negative    HPV 18 DNA Negative NEG^Negative    Other HR HPV Negative NEG^Negative    Final Diagnosis This patient's sample is negative for HPV DNA.     Specimen Description Cervical Cells                Enclosure: Lab Results

## 2018-10-10 NOTE — MR AVS SNAPSHOT
After Visit Summary   10/10/2018    Radha Turcios    MRN: [id number]           Patient Information     Date Of Birth          1960        Visit Information        Provider Department      10/10/2018 11:00 AM Azul Parker,  [facility]        Today's Diagnoses     Encounter for preventative adult health care exam with abnormal findings    -  1    Mixed hyperlipidemia        Panic disorder with agoraphobia, severe agoraphobic avoidance and mild panic attacks        Anxiety        Episode of recurrent major depressive disorder, unspecified depression episode severity (H)        Elevated AST (SGOT)        Screening for cervical cancer        Screen for colon cancer          Care Instructions    Labs today and pap smear  Mail in stool test  Try to still take only 2 Alprazolam per day if able  Consider meeting with a psychiatrist for better anxiety control  Follow up with new primary care provider in the new year    Preventive Health Recommendations  Female Ages 50 - 64    Yearly exam: See your health care provider every year in order to  o Review health changes.   o Discuss preventive care.    o Review your medicines if your doctor has prescribed any.      Get a Pap test every three years (unless you have an abnormal result and your provider advises testing more often).    If you get Pap tests with HPV test, you only need to test every 5 years, unless you have an abnormal result.     You do not need a Pap test if your uterus was removed (hysterectomy) and you have not had cancer.    You should be tested each year for STDs (sexually transmitted diseases) if you're at risk.     Have a mammogram every 1 to 2 years.    Have a colonoscopy at age 50, or have a yearly FIT test (stool test). These exams screen for colon cancer.      Have a cholesterol test every 5 years, or more often if advised.    Have a diabetes test (fasting glucose) every three years. If you are at risk for diabetes,  you should have this test more often.     If you are at risk for osteoporosis (brittle bone disease), think about having a bone density scan (DEXA).    Shots: Get a flu shot each year. Get a tetanus shot every 10 years.    Nutrition:     Eat at least 5 servings of fruits and vegetables each day.    Eat whole-grain bread, whole-wheat pasta and brown rice instead of white grains and rice.    Get adequate Calcium and Vitamin D.     Lifestyle    Exercise at least 150 minutes a week (30 minutes a day, 5 days a week). This will help you control your weight and prevent disease.    Limit alcohol to one drink per day.    No smoking.     Wear sunscreen to prevent skin cancer.     See your dentist every six months for an exam and cleaning.    See your eye doctor every 1 to 2 years.            Follow-ups after your visit        Future tests that were ordered for you today     Open Future Orders        Priority Expected Expires Ordered    Fecal colorectal cancer screen (FIT) Routine 10/31/2018 1/2/2019 10/10/2018            Who to contact     If you have questions or need follow up information about today's clinic visit or your schedule please contact [facility] directly at [phone].  Normal or non-critical lab and imaging results will be communicated to you by MyChart, letter or phone within 4 business days after the clinic has received the results. If you do not hear from us within 7 days, please contact the clinic through MyChart or phone. If you have a critical or abnormal lab result, we will notify you by phone as soon as possible.  Submit refill requests through [employer] or call your pharmacy and they will forward the refill request to us. Please allow 3 business days for your refill to be completed.          Additional Information About Your Visit        Care EveryWhere ID     This is your Care EveryWhere ID. This could be used by other organizations to access your [place] medical records  [id number]    "     Your Vitals Were     Pulse Temperature Height Last Period Pulse Oximetry Breastfeeding?    119 97.7  F (36.5  C) (Oral) 5' 4.25\" (1.632 m) 09/14/2001 100% No    BMI (Body Mass Index)                   26.28 kg/m2            Blood Pressure from Last 3 Encounters:   10/10/18 133/87   07/18/18 118/85   10/02/17 131/86    Weight from Last 3 Encounters:   10/10/18 154 lb 4.8 oz (70 kg)   07/18/18 154 lb (69.9 kg)   10/02/17 150 lb 1.6 oz (68.1 kg)              We Performed the Following     CBC with platelets     Comprehensive metabolic panel     HPV High Risk Types DNA Cervical     Lipid panel reflex to direct LDL Fasting     Pap imaged thin layer screen with HPV - recommended age 30 - 65 years (select HPV order below)        Primary Care Provider Office Phone # Fax #    Azul Parker,  [phone] [address]  [facility] [address]        Equal Access to Services     [facility] : Hadii aad ku hadasho Soomaali, waaxda luqadaha, qaybta kaalmada adeegyada, waxay franchesca haypedro weems . So [facility] [phone].    ATENCIÓN: Si habla español, tiene a rose disposición servicios gratuitos de asistencia lingüística. Llame al [phone].    We comply with applicable federal civil rights laws and [place] laws. We do not discriminate on the basis of race, color, national origin, age, disability, sex, sexual orientation, or gender identity.            Thank you!     Thank you for choosing [facility]  for your care. Our goal is always to provide you with excellent care. Hearing back from our patients is one way we can continue to improve our services. Please take a few minutes to complete the written survey that you may receive in the mail after your visit with us. Thank you!             Your Updated Medication List - Protect others around you: Learn how to safely use, store and throw away your medicines at www.disposemymeds.org.          This list is accurate as of " 10/10/18 12:16 PM.  Always use your most recent med list.                   Brand Name Dispense Instructions for use Diagnosis    ALPRAZolam 0.5 MG tablet    XANAX    60 tablet    Take 1 tablet (0.5 mg) by mouth 2 times daily as needed Prescription to last one month    Anxiety       amitriptyline 25 MG tablet    ELAVIL    180 tablet    TAKE 1 TO 2 TABLETS AT BEDTIME    Episode of recurrent major depressive disorder, unspecified depression episode severity (H), Anxiety       B-12 1000 MCG Tbcr     100 tablet    Take 1,000 mcg by mouth daily    Vitamin B12 deficiency (non anemic)       fenofibrate 145 MG tablet     90 tablet    TAKE 1 TABLET DAILY    Mixed hyperlipidemia       FISH OIL CONCENTRATE 1000 MG Caps           omeprazole 20 MG CR capsule    priLOSEC    90 capsule    TAKE 1 CAPSULE DAILY    Gastroesophageal reflux disease without esophagitis       rosuvastatin 40 MG tablet    CRESTOR    45 tablet    Take 0.5 tablets (20 mg) by mouth daily    Mixed hyperlipidemia       valACYclovir 500 MG tablet    VALTREX    8 tablet    TAKE 4 TABLETS ONCE, REPEAT IN 12 HOURS AS NEEDED    Cold sore       VIIBRYD 40 MG Tabs tablet   Generic drug:  vilazodone     90 tablet    TAKE 1 TABLET DAILY    Anxiety, Episode of recurrent major depressive disorder, unspecified depression episode severity (H)

## 2018-10-10 NOTE — PROGRESS NOTES
"   SUBJECTIVE:   CC: Radha Turcios is an 58 year old woman who presents for preventive health visit.     Healthy Habits:    Do you get at least three servings of calcium containing foods daily (dairy, green leafy vegetables, etc.)? yes    Amount of exercise or daily activities, outside of work: nothing    Problems taking medications regularly No    Medication side effects: No    Have you had an eye exam in the past two years? Yes     Do you see a dentist twice per year? No     Do you have sleep apnea, excessive snoring or daytime drowsiness? Yes - drowsiness    Radha is here for physical/pap but also to discuss her lifelong anxiety on chronic benzodiazepines (see prior notes)  LONG h/o panic attacks which also runs in the family  Was agoraphobic and couldn't leave the house for 6 years until she says she was Rx Alprazolam TID years ago  Did experiment with BID Alprazolam and did \"ok\" but prefers TID b/c if doesn't take it then she can't leave the house  Is lonesome during the day b/c \" is a workaholic\"  Says has only has 7 alcoholic drinks per week spread out; wine cooler or ld  She says propranolol (see notes) made anxiety worse and so did Atarax when we recently tried them as a substitute for one of her Alprazolam doses  Has horrific family history in terms of multiple siblings dying from cancer and one of her sisters just recently diagnosed with breast cancer  >15 yrs ago had more testing on pap smear that returned benign; no postmenopausal bleeding  Declines flu shot  Says negative HIV screening years ago    Today's PHQ-2 Score:   PHQ-2 ( 1999 Pfizer) 10/10/2018 10/2/2017   Q1: Little interest or pleasure in doing things 1 0   Q2: Feeling down, depressed or hopeless 3 3   PHQ-2 Score 4 3       Abuse: Current or Past(Physical, Sexual or Emotional)- No  Do you feel safe in your environment - Yes    Social History   Substance Use Topics     Smoking status: Current Every Day Smoker     Packs/day: " "1.00     Years: 23.00     Types: Cigarettes     Smokeless tobacco: Never Used     Alcohol use 0.0 oz/week     0 Standard drinks or equivalent per week      Comment: 4-5 drinks per week     If you drink alcohol do you typically have >3 drinks per day or >7 drinks per week? No                     Reviewed orders with patient.  Reviewed health maintenance and updated orders accordingly - Yes  Patient over age 50, mutual decision to screen reflected in health maintenance.  Pertinent mammograms are reviewed under the imaging tab.  History of abnormal Pap smear: NO (not since distant past) - age 30-65 PAP every 5 years with negative HPV co-testing recommended  PAP / HPV Latest Ref Rng & Units 10/10/2018 8/7/2015 10/5/2001   PAP - NIL NIL -   PAP DATE - [facility] - - - 646012   HPV 16 DNA NEG:Negative Negative Negative -   HPV 18 DNA NEG:Negative Negative Negative -   OTHER HR HPV NEG:Negative Negative Negative -         ROS:  Comprehensive ROS negative unless as stated above in HPI.     OBJECTIVE:   /87 (BP Location: Right arm, Patient Position: Sitting, Cuff Size: Adult Regular)  Pulse 100  Temp 97.7  F (36.5  C) (Oral)  Ht 5' 4.25\" (1.632 m)  Wt 154 lb 4.8 oz (70 kg)  LMP 09/14/2001  SpO2 100%  Breastfeeding? No  BMI 26.28 kg/m2  EXAM:  GENERAL APPEARANCE: alert, no distress and appears older than stated age  EYES: Eyes grossly normal to inspection, PERRL and conjunctivae and sclerae normal  HENT: ear canals and TM's normal, mouth without ulcers or lesions, oropharynx clear and oral mucous membranes moist  NECK: no adenopathy, no asymmetry, masses, or scars and thyroid normal to palpation  RESP: lungs clear to auscultation - no rales, rhonchi or wheezes  CV: heart regular tachy without mrg, no carotid bruits, no edema  ABDOMEN: soft, nontender, no hepatosplenomegaly, no masses and bowel sounds normal   (female): normal female external genitalia, normal urethral meatus, vaginal mucosal atrophy noted, normal " cervix, adnexae, and uterus without masses or abnormal discharge  MS: no musculoskeletal defects are noted and gait is age appropriate without ataxia  NEURO: normal speech and gait, gets confused about some data points  PSYCH: chronic anxiety is well noted though she is more calm today and isn't tearful like she was at last visit    ASSESSMENT/PLAN:   1. Encounter for preventative adult health care exam with abnormal findings  Mammogram within normal limits today  She declined flu shot again this year  She prefers FIT test over colonoscopy for colon cancer screening even though one of her sisters has h/o colon cancer  - CBC with platelets    2. Mixed hyperlipidemia  Due for recheck labs; Consider discontinue fenofibrate as is already on Crestor  - Lipid panel reflex to direct LDL Fasting  - Comprehensive metabolic panel    3. Panic disorder with agoraphobia, severe agoraphobic avoidance and mild panic attacks  See below    4. Anxiety  Says she has Xanax script coming from mail order soon  Encouraged trying to take only 2 per day but will allow up to 3 max per her baseline for her panic/agoraphobia which she says is helpful for her to function and does have compliance with max dose Viibryd as well  She has been on this regimen for years and no signs of abuse though I worry at times about her depression and reported alcohol use too  She does seem more relaxed today (last visit was sobbing a lot)   She refuses to see psychiatry    5. Episode of recurrent major depressive disorder, unspecified depression episode severity (H)  Continues on Viibryd  PHQ-9 SCORE 1/16/2017 10/2/2017 7/18/2018   Total Score - - -   Total Score 11 10 5     6. Elevated AST (SGOT)  Discussed alcohol use which she reports is down  Consider discontinue fenofibrate as is already on Crestor  ADDENDUM: Transaminases did return now back within normal limits     7. Screening for cervical cancer  - Pap imaged thin layer screen with HPV - recommended  "age 30 - 65 years (select HPV order below)  - HPV High Risk Types DNA Cervical    8. Screen for colon cancer  She prefers FIT over colonoscopy despite sister's h/o colon cancer  - Fecal colorectal cancer screen (FIT); Future    COUNSELING:   Reviewed preventive health counseling, as reflected in patient instructions    BP Readings from Last 1 Encounters:   10/10/18 133/87     Estimated body mass index is 26.28 kg/(m^2) as calculated from the following:    Height as of this encounter: 5' 4.25\" (1.632 m).    Weight as of this encounter: 154 lb 4.8 oz (70 kg).  Wt Readings from Last 4 Encounters:   10/10/18 154 lb 4.8 oz (70 kg)   07/18/18 154 lb (69.9 kg)   10/02/17 150 lb 1.6 oz (68.1 kg)   01/16/17 156 lb 14.4 oz (71.2 kg)     Weight management plan: Discussed healthy diet and exercise guidelines and patient will follow up in 6 months in clinic to re-evaluate.     reports that she has been smoking Cigarettes.  She has a 23.00 pack-year smoking history. She has never used smokeless tobacco.  Tobacco Cessation Action Plan: Information offered: Patient not interested at this time    Patient Instructions   Labs today and pap smear  Mail in stool test  Try to still take only 2 Alprazolam per day if able  Consider meeting with a psychiatrist for better anxiety control  Follow up with new primary care provider in the new year (as I am transitioning out of the clinic)    Azul Parker, DO  [facility]  "

## 2018-10-10 NOTE — PATIENT INSTRUCTIONS
Labs today and pap smear  Mail in stool test  Try to still take only 2 Alprazolam per day if able  Consider meeting with a psychiatrist for better anxiety control  Follow up with new primary care provider in the new year    Preventive Health Recommendations  Female Ages 50 - 64    Yearly exam: See your health care provider every year in order to  o Review health changes.   o Discuss preventive care.    o Review your medicines if your doctor has prescribed any.      Get a Pap test every three years (unless you have an abnormal result and your provider advises testing more often).    If you get Pap tests with HPV test, you only need to test every 5 years, unless you have an abnormal result.     You do not need a Pap test if your uterus was removed (hysterectomy) and you have not had cancer.    You should be tested each year for STDs (sexually transmitted diseases) if you're at risk.     Have a mammogram every 1 to 2 years.    Have a colonoscopy at age 50, or have a yearly FIT test (stool test). These exams screen for colon cancer.      Have a cholesterol test every 5 years, or more often if advised.    Have a diabetes test (fasting glucose) every three years. If you are at risk for diabetes, you should have this test more often.     If you are at risk for osteoporosis (brittle bone disease), think about having a bone density scan (DEXA).    Shots: Get a flu shot each year. Get a tetanus shot every 10 years.    Nutrition:     Eat at least 5 servings of fruits and vegetables each day.    Eat whole-grain bread, whole-wheat pasta and brown rice instead of white grains and rice.    Get adequate Calcium and Vitamin D.     Lifestyle    Exercise at least 150 minutes a week (30 minutes a day, 5 days a week). This will help you control your weight and prevent disease.    Limit alcohol to one drink per day.    No smoking.     Wear sunscreen to prevent skin cancer.     See your dentist every six months for an exam and  cleaning.    See your eye doctor every 1 to 2 years.

## 2018-10-11 LAB
ALBUMIN SERPL-MCNC: 4 G/DL (ref 3.4–5)
ALP SERPL-CCNC: 51 U/L (ref 40–150)
ALT SERPL W P-5'-P-CCNC: 22 U/L (ref 0–50)
ANION GAP SERPL CALCULATED.3IONS-SCNC: 11 MMOL/L (ref 3–14)
AST SERPL W P-5'-P-CCNC: 41 U/L (ref 0–45)
BILIRUB SERPL-MCNC: 0.6 MG/DL (ref 0.2–1.3)
BUN SERPL-MCNC: 12 MG/DL (ref 7–30)
CALCIUM SERPL-MCNC: 8.7 MG/DL (ref 8.5–10.1)
CHLORIDE SERPL-SCNC: 101 MMOL/L (ref 94–109)
CHOLEST SERPL-MCNC: 94 MG/DL
CO2 SERPL-SCNC: 22 MMOL/L (ref 20–32)
CREAT SERPL-MCNC: 0.66 MG/DL (ref 0.52–1.04)
GFR SERPL CREATININE-BSD FRML MDRD: >90 ML/MIN/1.7M2
GLUCOSE SERPL-MCNC: 85 MG/DL (ref 70–99)
HDLC SERPL-MCNC: 15 MG/DL
LDLC SERPL CALC-MCNC: 48 MG/DL
NONHDLC SERPL-MCNC: 79 MG/DL
POTASSIUM SERPL-SCNC: 3.9 MMOL/L (ref 3.4–5.3)
PROT SERPL-MCNC: 7.8 G/DL (ref 6.8–8.8)
SODIUM SERPL-SCNC: 134 MMOL/L (ref 133–144)
TRIGL SERPL-MCNC: 157 MG/DL

## 2018-10-12 DIAGNOSIS — K21.9 GASTROESOPHAGEAL REFLUX DISEASE WITHOUT ESOPHAGITIS: ICD-10-CM

## 2018-10-12 LAB
COPATH REPORT: NORMAL
PAP: NORMAL

## 2018-10-13 NOTE — TELEPHONE ENCOUNTER
"Last Written Prescription Date:  4/16/18  Last Fill Quantity: 90 capsule,  # refills: 1   Last office visit: 10/10/2018 with prescribing provider:  Keith   Future Office Visit:      Requested Prescriptions   Pending Prescriptions Disp Refills     omeprazole (PRILOSEC) 20 MG CR capsule [Pharmacy Med Name: OMEPRAZOLE DR CAPS 20MG] 90 capsule 1     Sig: TAKE 1 CAPSULE DAILY    PPI Protocol Passed    10/12/2018 11:42 PM       Passed - Not on Clopidogrel (unless Pantoprazole ordered)       Passed - No diagnosis of osteoporosis on record       Passed - Recent (12 mo) or future (30 days) visit within the authorizing provider's specialty    Patient had office visit in the last 12 months or has a visit in the next 30 days with authorizing provider or within the authorizing provider's specialty.  See \"Patient Info\" tab in inbasket, or \"Choose Columns\" in Meds & Orders section of the refill encounter.           Passed - Patient is age 18 or older       Passed - No active pregnacy on record       Passed - No positive pregnancy test in past 12 months          "

## 2018-10-16 LAB
FINAL DIAGNOSIS: NORMAL
HPV HR 12 DNA CVX QL NAA+PROBE: NEGATIVE
HPV16 DNA SPEC QL NAA+PROBE: NEGATIVE
HPV18 DNA SPEC QL NAA+PROBE: NEGATIVE
SPECIMEN DESCRIPTION: NORMAL
SPECIMEN SOURCE CVX/VAG CYTO: NORMAL

## 2018-10-28 PROCEDURE — 82274 ASSAY TEST FOR BLOOD FECAL: CPT | Performed by: INTERNAL MEDICINE

## 2018-10-29 DIAGNOSIS — Z12.11 SCREEN FOR COLON CANCER: ICD-10-CM

## 2018-10-30 LAB — HEMOCCULT STL QL IA: NEGATIVE

## 2018-11-04 PROBLEM — R79.89 ABNORMAL TSH: Status: ACTIVE | Noted: 2018-11-04

## 2018-11-04 RX ORDER — ROSUVASTATIN CALCIUM 40 MG/1
20 TABLET, COATED ORAL DAILY
Qty: 45 TABLET | Refills: 3 | Status: SHIPPED | OUTPATIENT
Start: 2018-11-04 | End: 2019-01-01

## 2018-12-04 RX ORDER — FENOFIBRATE 145 MG/1
TABLET, COATED ORAL
Refills: 0
Start: 2018-12-04

## 2018-12-04 NOTE — TELEPHONE ENCOUNTER
"Requested Prescriptions   Pending Prescriptions Disp Refills     fenofibrate (TRICOR) 145 MG tablet [Pharmacy Med Name: FENOFIBRATE XIOMY TABS 145MG]  DISCONTINUED 10/18/18  Last Written Prescription Date:  9/5/18  Last Fill Quantity: 90 TABLET,  # refills: 0   Last office visit: 10/10/2018 with prescribing provider:  [facility]   Future Office Visit:     90 tablet 0     Sig: TAKE 1 TABLET DAILY    Fibrates Passed    12/2/2018  7:42 AM       Passed - Lipid panel on file in past 12 months    Recent Labs   Lab Test  10/10/18   1224   08/07/15   1416   CHOL  94   < >  122   TRIG  157*   < >  173*   HDL  15*   < >  16*   LDL  48   < >  71   NHDL  79   < >   --    VLDL   --    --   35*   CHOLHDLRATIO   --    --   7.6*    < > = values in this interval not displayed.              Passed - No abnormal creatine kinase in past 12 months    No lab results found.            Passed - Recent (12 mo) or future (30 days) visit within the authorizing provider's specialty    Patient had office visit in the last 12 months or has a visit in the next 30 days with authorizing provider or within the authorizing provider's specialty.  See \"Patient Info\" tab in inbasket, or \"Choose Columns\" in Meds & Orders section of the refill encounter.             Passed - Patient is age 18 or older       Passed - No active pregnancy on record       Passed - No positive pregnancy test in past 12 months          "

## 2019-01-01 ENCOUNTER — TELEPHONE (OUTPATIENT)
Dept: FAMILY MEDICINE | Facility: CLINIC | Age: 59
End: 2019-01-01

## 2019-01-01 ENCOUNTER — APPOINTMENT (OUTPATIENT)
Dept: SPEECH THERAPY | Facility: CLINIC | Age: 59
DRG: 441 | End: 2019-01-01
Attending: HOSPITALIST
Payer: COMMERCIAL

## 2019-01-01 ENCOUNTER — APPOINTMENT (OUTPATIENT)
Dept: GENERAL RADIOLOGY | Facility: CLINIC | Age: 59
DRG: 432 | End: 2019-01-01
Attending: EMERGENCY MEDICINE
Payer: COMMERCIAL

## 2019-01-01 ENCOUNTER — PATIENT OUTREACH (OUTPATIENT)
Dept: CARE COORDINATION | Facility: CLINIC | Age: 59
End: 2019-01-01

## 2019-01-01 ENCOUNTER — APPOINTMENT (OUTPATIENT)
Dept: PHYSICAL THERAPY | Facility: CLINIC | Age: 59
DRG: 441 | End: 2019-01-01
Attending: HOSPITALIST
Payer: COMMERCIAL

## 2019-01-01 ENCOUNTER — HOSPITAL ENCOUNTER (INPATIENT)
Facility: CLINIC | Age: 59
LOS: 11 days | Discharge: NURSING, REHAB OR LONG-TERM CARE | DRG: 441 | End: 2019-10-18
Attending: HOSPITALIST | Admitting: HOSPITALIST
Payer: COMMERCIAL

## 2019-01-01 ENCOUNTER — NURSE TRIAGE (OUTPATIENT)
Dept: NURSING | Facility: CLINIC | Age: 59
End: 2019-01-01

## 2019-01-01 ENCOUNTER — HOSPITAL ENCOUNTER (INPATIENT)
Facility: CLINIC | Age: 59
LOS: 5 days | Discharge: HOME HEALTH | DRG: 432 | End: 2019-11-15
Attending: EMERGENCY MEDICINE | Admitting: INTERNAL MEDICINE
Payer: COMMERCIAL

## 2019-01-01 ENCOUNTER — APPOINTMENT (OUTPATIENT)
Dept: GENERAL RADIOLOGY | Facility: CLINIC | Age: 59
DRG: 682 | End: 2019-01-01
Attending: PHYSICIAN ASSISTANT
Payer: COMMERCIAL

## 2019-01-01 ENCOUNTER — APPOINTMENT (OUTPATIENT)
Dept: OCCUPATIONAL THERAPY | Facility: CLINIC | Age: 59
DRG: 441 | End: 2019-01-01
Attending: HOSPITALIST
Payer: COMMERCIAL

## 2019-01-01 ENCOUNTER — OFFICE VISIT (OUTPATIENT)
Dept: FAMILY MEDICINE | Facility: CLINIC | Age: 59
End: 2019-01-01
Payer: COMMERCIAL

## 2019-01-01 ENCOUNTER — APPOINTMENT (OUTPATIENT)
Dept: GENERAL RADIOLOGY | Facility: CLINIC | Age: 59
DRG: 441 | End: 2019-01-01
Attending: PHYSICIAN ASSISTANT
Payer: COMMERCIAL

## 2019-01-01 ENCOUNTER — APPOINTMENT (OUTPATIENT)
Dept: CT IMAGING | Facility: CLINIC | Age: 59
DRG: 682 | End: 2019-01-01
Attending: STUDENT IN AN ORGANIZED HEALTH CARE EDUCATION/TRAINING PROGRAM
Payer: COMMERCIAL

## 2019-01-01 ENCOUNTER — HOSPITAL ENCOUNTER (OUTPATIENT)
Facility: CLINIC | Age: 59
Discharge: HOME | End: 2019-12-03
Admitting: FAMILY MEDICINE
Payer: COMMERCIAL

## 2019-01-01 ENCOUNTER — DOCUMENTATION ONLY (OUTPATIENT)
Dept: OTHER | Facility: CLINIC | Age: 59
End: 2019-01-01

## 2019-01-01 ENCOUNTER — APPOINTMENT (OUTPATIENT)
Dept: ULTRASOUND IMAGING | Facility: CLINIC | Age: 59
DRG: 682 | End: 2019-01-01
Attending: INTERNAL MEDICINE
Payer: COMMERCIAL

## 2019-01-01 ENCOUNTER — APPOINTMENT (OUTPATIENT)
Dept: PHYSICAL THERAPY | Facility: CLINIC | Age: 59
DRG: 432 | End: 2019-01-01
Payer: COMMERCIAL

## 2019-01-01 ENCOUNTER — TELEPHONE (OUTPATIENT)
Dept: LAB | Facility: CLINIC | Age: 59
End: 2019-01-01

## 2019-01-01 ENCOUNTER — HOSPITAL ENCOUNTER (OUTPATIENT)
Dept: BEHAVIORAL HEALTH | Facility: CLINIC | Age: 59
Discharge: HOME | End: 2019-10-29
Attending: SOCIAL WORKER | Admitting: SOCIAL WORKER
Payer: COMMERCIAL

## 2019-01-01 ENCOUNTER — APPOINTMENT (OUTPATIENT)
Dept: GENERAL RADIOLOGY | Facility: CLINIC | Age: 59
DRG: 441 | End: 2019-01-01
Attending: NURSE PRACTITIONER
Payer: COMMERCIAL

## 2019-01-01 ENCOUNTER — HOSPITAL ENCOUNTER (OUTPATIENT)
Dept: ULTRASOUND IMAGING | Facility: CLINIC | Age: 59
End: 2019-12-03
Attending: FAMILY MEDICINE
Payer: COMMERCIAL

## 2019-01-01 ENCOUNTER — MEDICAL CORRESPONDENCE (OUTPATIENT)
Dept: HEALTH INFORMATION MANAGEMENT | Facility: CLINIC | Age: 59
End: 2019-01-01

## 2019-01-01 ENCOUNTER — APPOINTMENT (OUTPATIENT)
Dept: GENERAL RADIOLOGY | Facility: CLINIC | Age: 59
DRG: 441 | End: 2019-01-01
Attending: INTERNAL MEDICINE
Payer: COMMERCIAL

## 2019-01-01 ENCOUNTER — HOSPITAL ENCOUNTER (INPATIENT)
Facility: CLINIC | Age: 59
LOS: 5 days | Discharge: HOSPICE | DRG: 682 | End: 2019-11-27
Attending: EMERGENCY MEDICINE | Admitting: INTERNAL MEDICINE
Payer: COMMERCIAL

## 2019-01-01 ENCOUNTER — APPOINTMENT (OUTPATIENT)
Dept: CT IMAGING | Facility: CLINIC | Age: 59
DRG: 432 | End: 2019-01-01
Attending: EMERGENCY MEDICINE
Payer: COMMERCIAL

## 2019-01-01 ENCOUNTER — APPOINTMENT (OUTPATIENT)
Dept: ULTRASOUND IMAGING | Facility: CLINIC | Age: 59
DRG: 682 | End: 2019-01-01
Attending: PHYSICIAN ASSISTANT
Payer: COMMERCIAL

## 2019-01-01 ENCOUNTER — APPOINTMENT (OUTPATIENT)
Dept: OCCUPATIONAL THERAPY | Facility: CLINIC | Age: 59
DRG: 432 | End: 2019-01-01
Payer: COMMERCIAL

## 2019-01-01 ENCOUNTER — APPOINTMENT (OUTPATIENT)
Dept: SPEECH THERAPY | Facility: CLINIC | Age: 59
DRG: 441 | End: 2019-01-01
Attending: INTERNAL MEDICINE
Payer: COMMERCIAL

## 2019-01-01 ENCOUNTER — HOSPITAL ENCOUNTER (EMERGENCY)
Facility: CLINIC | Age: 59
Discharge: ANOTHER HOSPITAL | End: 2019-10-07
Attending: EMERGENCY MEDICINE | Admitting: EMERGENCY MEDICINE
Payer: COMMERCIAL

## 2019-01-01 ENCOUNTER — APPOINTMENT (OUTPATIENT)
Dept: ULTRASOUND IMAGING | Facility: CLINIC | Age: 59
DRG: 432 | End: 2019-01-01
Attending: PHYSICIAN ASSISTANT
Payer: COMMERCIAL

## 2019-01-01 ENCOUNTER — APPOINTMENT (OUTPATIENT)
Dept: CT IMAGING | Facility: CLINIC | Age: 59
End: 2019-01-01
Attending: EMERGENCY MEDICINE
Payer: COMMERCIAL

## 2019-01-01 ENCOUNTER — APPOINTMENT (OUTPATIENT)
Dept: PHYSICAL THERAPY | Facility: CLINIC | Age: 59
DRG: 432 | End: 2019-01-01
Attending: INTERNAL MEDICINE
Payer: COMMERCIAL

## 2019-01-01 ENCOUNTER — APPOINTMENT (OUTPATIENT)
Dept: GENERAL RADIOLOGY | Facility: CLINIC | Age: 59
End: 2019-01-01
Attending: EMERGENCY MEDICINE
Payer: COMMERCIAL

## 2019-01-01 ENCOUNTER — APPOINTMENT (OUTPATIENT)
Dept: OCCUPATIONAL THERAPY | Facility: CLINIC | Age: 59
DRG: 432 | End: 2019-01-01
Attending: INTERNAL MEDICINE
Payer: COMMERCIAL

## 2019-01-01 ENCOUNTER — APPOINTMENT (OUTPATIENT)
Dept: ULTRASOUND IMAGING | Facility: CLINIC | Age: 59
DRG: 441 | End: 2019-01-01
Attending: PHYSICIAN ASSISTANT
Payer: COMMERCIAL

## 2019-01-01 VITALS
WEIGHT: 136.6 LBS | RESPIRATION RATE: 16 BRPM | BODY MASS INDEX: 23.27 KG/M2 | HEART RATE: 102 BPM | OXYGEN SATURATION: 99 % | DIASTOLIC BLOOD PRESSURE: 59 MMHG | TEMPERATURE: 98.3 F | SYSTOLIC BLOOD PRESSURE: 97 MMHG

## 2019-01-01 VITALS
BODY MASS INDEX: 24.79 KG/M2 | HEIGHT: 64 IN | HEART RATE: 95 BPM | SYSTOLIC BLOOD PRESSURE: 91 MMHG | DIASTOLIC BLOOD PRESSURE: 62 MMHG | OXYGEN SATURATION: 97 % | TEMPERATURE: 98.6 F | WEIGHT: 145.2 LBS

## 2019-01-01 VITALS
TEMPERATURE: 97.8 F | SYSTOLIC BLOOD PRESSURE: 94 MMHG | OXYGEN SATURATION: 100 % | DIASTOLIC BLOOD PRESSURE: 48 MMHG | HEART RATE: 94 BPM | RESPIRATION RATE: 20 BRPM

## 2019-01-01 VITALS
HEART RATE: 97 BPM | DIASTOLIC BLOOD PRESSURE: 66 MMHG | OXYGEN SATURATION: 98 % | HEIGHT: 64 IN | WEIGHT: 163.7 LBS | TEMPERATURE: 98.3 F | RESPIRATION RATE: 18 BRPM | BODY MASS INDEX: 27.95 KG/M2 | SYSTOLIC BLOOD PRESSURE: 109 MMHG

## 2019-01-01 VITALS
HEIGHT: 64 IN | OXYGEN SATURATION: 96 % | TEMPERATURE: 97 F | BODY MASS INDEX: 30.42 KG/M2 | HEART RATE: 80 BPM | SYSTOLIC BLOOD PRESSURE: 101 MMHG | DIASTOLIC BLOOD PRESSURE: 49 MMHG | RESPIRATION RATE: 16 BRPM | WEIGHT: 178.2 LBS

## 2019-01-01 VITALS
BODY MASS INDEX: 25.44 KG/M2 | HEART RATE: 117 BPM | OXYGEN SATURATION: 96 % | HEIGHT: 64 IN | WEIGHT: 149 LBS | DIASTOLIC BLOOD PRESSURE: 77 MMHG | SYSTOLIC BLOOD PRESSURE: 116 MMHG | TEMPERATURE: 98 F

## 2019-01-01 VITALS
TEMPERATURE: 98.2 F | SYSTOLIC BLOOD PRESSURE: 102 MMHG | OXYGEN SATURATION: 96 % | DIASTOLIC BLOOD PRESSURE: 54 MMHG | HEART RATE: 100 BPM | RESPIRATION RATE: 25 BRPM

## 2019-01-01 VITALS
SYSTOLIC BLOOD PRESSURE: 109 MMHG | OXYGEN SATURATION: 100 % | BODY MASS INDEX: 27.28 KG/M2 | RESPIRATION RATE: 14 BRPM | DIASTOLIC BLOOD PRESSURE: 64 MMHG | HEIGHT: 64 IN | WEIGHT: 159.8 LBS | HEART RATE: 110 BPM

## 2019-01-01 VITALS
SYSTOLIC BLOOD PRESSURE: 101 MMHG | HEIGHT: 64 IN | TEMPERATURE: 98.2 F | RESPIRATION RATE: 14 BRPM | DIASTOLIC BLOOD PRESSURE: 64 MMHG | WEIGHT: 170.2 LBS | OXYGEN SATURATION: 100 % | HEART RATE: 107 BPM | BODY MASS INDEX: 29.06 KG/M2

## 2019-01-01 VITALS — HEIGHT: 64 IN | BODY MASS INDEX: 26.98 KG/M2 | WEIGHT: 158 LBS

## 2019-01-01 DIAGNOSIS — D50.9 IRON DEFICIENCY ANEMIA, UNSPECIFIED IRON DEFICIENCY ANEMIA TYPE: ICD-10-CM

## 2019-01-01 DIAGNOSIS — K70.31 ALCOHOLIC CIRRHOSIS OF LIVER WITH ASCITES (H): ICD-10-CM

## 2019-01-01 DIAGNOSIS — N39.0 URINARY TRACT INFECTION WITHOUT HEMATURIA, SITE UNSPECIFIED: ICD-10-CM

## 2019-01-01 DIAGNOSIS — F41.9 ANXIETY: Primary | Chronic | ICD-10-CM

## 2019-01-01 DIAGNOSIS — E87.6 HYPOKALEMIA: ICD-10-CM

## 2019-01-01 DIAGNOSIS — N17.9 ACUTE RENAL FAILURE, UNSPECIFIED ACUTE RENAL FAILURE TYPE (H): ICD-10-CM

## 2019-01-01 DIAGNOSIS — R18.8 ASCITES: ICD-10-CM

## 2019-01-01 DIAGNOSIS — B00.1 COLD SORE: ICD-10-CM

## 2019-01-01 DIAGNOSIS — E78.2 MIXED HYPERLIPIDEMIA: Chronic | ICD-10-CM

## 2019-01-01 DIAGNOSIS — K70.31 ALCOHOLIC CIRRHOSIS OF LIVER WITH ASCITES (H): Primary | ICD-10-CM

## 2019-01-01 DIAGNOSIS — N17.9 AKI (ACUTE KIDNEY INJURY) (H): ICD-10-CM

## 2019-01-01 DIAGNOSIS — F41.9 ANXIETY: ICD-10-CM

## 2019-01-01 DIAGNOSIS — R63.5 ABNORMAL WEIGHT GAIN: ICD-10-CM

## 2019-01-01 DIAGNOSIS — Z71.89 OTHER SPECIFIED COUNSELING: ICD-10-CM

## 2019-01-01 DIAGNOSIS — G47.00 INSOMNIA, UNSPECIFIED TYPE: ICD-10-CM

## 2019-01-01 DIAGNOSIS — K76.82 HEPATIC ENCEPHALOPATHY (H): ICD-10-CM
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
## 2019-01-01 DIAGNOSIS — F33.9 EPISODE OF RECURRENT MAJOR DEPRESSIVE DISORDER, UNSPECIFIED DEPRESSION EPISODE SEVERITY (H): ICD-10-CM

## 2019-01-01 DIAGNOSIS — E72.20 HYPERAMMONEMIA (H): ICD-10-CM

## 2019-01-01 DIAGNOSIS — D53.1 OTHER MEGALOBLASTIC ANEMIA: Primary | ICD-10-CM

## 2019-01-01 DIAGNOSIS — E86.0 DEHYDRATION: ICD-10-CM

## 2019-01-01 DIAGNOSIS — E83.42 HYPOMAGNESEMIA: ICD-10-CM

## 2019-01-01 DIAGNOSIS — R60.0 BILATERAL LEG EDEMA: ICD-10-CM

## 2019-01-01 DIAGNOSIS — K21.9 GASTROESOPHAGEAL REFLUX DISEASE WITHOUT ESOPHAGITIS: ICD-10-CM

## 2019-01-01 DIAGNOSIS — S22.42XD CLOSED FRACTURE OF MULTIPLE RIBS OF LEFT SIDE WITH ROUTINE HEALING, SUBSEQUENT ENCOUNTER: ICD-10-CM

## 2019-01-01 DIAGNOSIS — Z51.5 HOSPICE CARE PATIENT: Primary | ICD-10-CM

## 2019-01-01 DIAGNOSIS — D68.9 COAGULOPATHY (H): ICD-10-CM

## 2019-01-01 DIAGNOSIS — F40.01: Primary | ICD-10-CM

## 2019-01-01 DIAGNOSIS — K76.82 HEPATIC ENCEPHALOPATHY (H): Primary | ICD-10-CM

## 2019-01-01 DIAGNOSIS — F41.9 ANXIETY: Chronic | ICD-10-CM

## 2019-01-01 DIAGNOSIS — D64.9 ANEMIA, UNSPECIFIED TYPE: ICD-10-CM

## 2019-01-01 DIAGNOSIS — R79.89 ABNORMAL TSH: ICD-10-CM

## 2019-01-01 DIAGNOSIS — S42.032A DISPLACED FRACTURE OF LATERAL END OF LEFT CLAVICLE, INITIAL ENCOUNTER FOR CLOSED FRACTURE: ICD-10-CM

## 2019-01-01 LAB
ABO + RH BLD: NORMAL
ALBUMIN FLD-MCNC: 0.2 G/DL
ALBUMIN SERPL-MCNC: 1.8 G/DL (ref 3.4–5)
ALBUMIN SERPL-MCNC: 2 G/DL (ref 3.4–5)
ALBUMIN SERPL-MCNC: 2.1 G/DL (ref 3.4–5)
ALBUMIN SERPL-MCNC: 2.2 G/DL (ref 3.4–5)
ALBUMIN SERPL-MCNC: 2.2 G/DL (ref 3.4–5)
ALBUMIN SERPL-MCNC: 2.3 G/DL (ref 3.4–5)
ALBUMIN SERPL-MCNC: 2.3 G/DL (ref 3.4–5)
ALBUMIN SERPL-MCNC: 2.4 G/DL (ref 3.4–5)
ALBUMIN SERPL-MCNC: 2.4 G/DL (ref 3.4–5)
ALBUMIN SERPL-MCNC: 2.9 G/DL (ref 3.4–5)
ALBUMIN UR-MCNC: 100 MG/DL
ALBUMIN UR-MCNC: 20 MG/DL
ALBUMIN UR-MCNC: 30 MG/DL
ALP SERPL-CCNC: 100 U/L (ref 40–150)
ALP SERPL-CCNC: 104 U/L (ref 40–150)
ALP SERPL-CCNC: 115 U/L (ref 40–150)
ALP SERPL-CCNC: 115 U/L (ref 40–150)
ALP SERPL-CCNC: 121 U/L (ref 40–150)
ALP SERPL-CCNC: 122 U/L (ref 40–150)
ALP SERPL-CCNC: 142 U/L (ref 40–150)
ALP SERPL-CCNC: 170 U/L (ref 40–150)
ALP SERPL-CCNC: 174 U/L (ref 40–150)
ALP SERPL-CCNC: 69 U/L (ref 40–150)
ALP SERPL-CCNC: 72 U/L (ref 40–150)
ALP SERPL-CCNC: 76 U/L (ref 40–150)
ALP SERPL-CCNC: 79 U/L (ref 40–150)
ALP SERPL-CCNC: 86 U/L (ref 40–150)
ALP SERPL-CCNC: 89 U/L (ref 40–150)
ALP SERPL-CCNC: 99 U/L (ref 40–150)
ALT SERPL W P-5'-P-CCNC: 19 U/L (ref 0–50)
ALT SERPL W P-5'-P-CCNC: 20 U/L (ref 0–50)
ALT SERPL W P-5'-P-CCNC: 21 U/L (ref 0–50)
ALT SERPL W P-5'-P-CCNC: 22 U/L (ref 0–50)
ALT SERPL W P-5'-P-CCNC: 22 U/L (ref 0–50)
ALT SERPL W P-5'-P-CCNC: 26 U/L (ref 0–50)
ALT SERPL W P-5'-P-CCNC: 30 U/L (ref 0–50)
ALT SERPL W P-5'-P-CCNC: 32 U/L (ref 0–50)
ALT SERPL W P-5'-P-CCNC: 35 U/L (ref 0–50)
ALT SERPL W P-5'-P-CCNC: 36 U/L (ref 0–50)
ALT SERPL W P-5'-P-CCNC: 37 U/L (ref 0–50)
ALT SERPL W P-5'-P-CCNC: 38 U/L (ref 0–50)
ALT SERPL W P-5'-P-CCNC: 39 U/L (ref 0–50)
ALT SERPL W P-5'-P-CCNC: 41 U/L (ref 0–50)
AMMONIA PLAS-SCNC: 114 UMOL/L (ref 10–50)
AMMONIA PLAS-SCNC: 133 UMOL/L (ref 10–50)
AMMONIA PLAS-SCNC: 23 UMOL/L (ref 10–50)
AMMONIA PLAS-SCNC: 33 UMOL/L (ref 10–50)
AMMONIA PLAS-SCNC: 47 UMOL/L (ref 10–50)
AMMONIA PLAS-SCNC: 60 UMOL/L (ref 10–50)
AMMONIA PLAS-SCNC: 69 UMOL/L (ref 10–50)
ANION GAP SERPL CALCULATED.3IONS-SCNC: 10 MMOL/L (ref 3–14)
ANION GAP SERPL CALCULATED.3IONS-SCNC: 11 MMOL/L (ref 3–14)
ANION GAP SERPL CALCULATED.3IONS-SCNC: 12 MMOL/L (ref 3–14)
ANION GAP SERPL CALCULATED.3IONS-SCNC: 13 MMOL/L (ref 3–14)
ANION GAP SERPL CALCULATED.3IONS-SCNC: 14 MMOL/L (ref 3–14)
ANION GAP SERPL CALCULATED.3IONS-SCNC: 19 MMOL/L (ref 6–17)
ANION GAP SERPL CALCULATED.3IONS-SCNC: 3 MMOL/L (ref 3–14)
ANION GAP SERPL CALCULATED.3IONS-SCNC: 4 MMOL/L (ref 3–14)
ANION GAP SERPL CALCULATED.3IONS-SCNC: 6 MMOL/L (ref 3–14)
ANION GAP SERPL CALCULATED.3IONS-SCNC: 7 MMOL/L (ref 3–14)
ANION GAP SERPL CALCULATED.3IONS-SCNC: 8 MMOL/L (ref 3–14)
ANION GAP SERPL CALCULATED.3IONS-SCNC: 8 MMOL/L (ref 3–14)
APPEARANCE FLD: CLEAR
APPEARANCE FLD: CLEAR
APPEARANCE FLD: NORMAL
APPEARANCE UR: ABNORMAL
AST SERPL W P-5'-P-CCNC: 137 U/L (ref 0–45)
AST SERPL W P-5'-P-CCNC: 35 U/L (ref 0–45)
AST SERPL W P-5'-P-CCNC: 37 U/L (ref 0–45)
AST SERPL W P-5'-P-CCNC: 39 U/L (ref 0–45)
AST SERPL W P-5'-P-CCNC: 39 U/L (ref 0–45)
AST SERPL W P-5'-P-CCNC: 40 U/L (ref 0–45)
AST SERPL W P-5'-P-CCNC: 42 U/L (ref 0–45)
AST SERPL W P-5'-P-CCNC: 51 U/L (ref 0–45)
AST SERPL W P-5'-P-CCNC: 52 U/L (ref 0–45)
AST SERPL W P-5'-P-CCNC: 59 U/L (ref 0–45)
AST SERPL W P-5'-P-CCNC: 59 U/L (ref 0–45)
AST SERPL W P-5'-P-CCNC: 60 U/L (ref 0–45)
AST SERPL W P-5'-P-CCNC: 61 U/L (ref 0–45)
AST SERPL W P-5'-P-CCNC: 63 U/L (ref 0–45)
AST SERPL W P-5'-P-CCNC: 70 U/L (ref 0–45)
AST SERPL W P-5'-P-CCNC: 72 U/L (ref 0–45)
BACTERIA #/AREA URNS HPF: ABNORMAL /HPF
BACTERIA #/AREA URNS HPF: ABNORMAL /HPF
BACTERIA SPEC CULT: ABNORMAL
BACTERIA SPEC CULT: NO GROWTH
BASOPHILS # BLD AUTO: 0 10E9/L (ref 0–0.2)
BASOPHILS NFR BLD AUTO: 0.1 %
BASOPHILS NFR BLD AUTO: 0.1 %
BASOPHILS NFR BLD AUTO: 0.3 %
BASOPHILS NFR BLD AUTO: 0.4 %
BASOPHILS NFR FLD MANUAL: 1 %
BILIRUB DIRECT SERPL-MCNC: 0.4 MG/DL (ref 0–0.2)
BILIRUB DIRECT SERPL-MCNC: 0.8 MG/DL (ref 0–0.2)
BILIRUB DIRECT SERPL-MCNC: 0.9 MG/DL (ref 0–0.2)
BILIRUB DIRECT SERPL-MCNC: 1.3 MG/DL (ref 0–0.2)
BILIRUB DIRECT SERPL-MCNC: 1.5 MG/DL (ref 0–0.2)
BILIRUB SERPL-MCNC: 0.7 MG/DL (ref 0.2–1.3)
BILIRUB SERPL-MCNC: 0.8 MG/DL (ref 0.2–1.3)
BILIRUB SERPL-MCNC: 1 MG/DL (ref 0.2–1.3)
BILIRUB SERPL-MCNC: 1.3 MG/DL (ref 0.2–1.3)
BILIRUB SERPL-MCNC: 1.5 MG/DL (ref 0.2–1.3)
BILIRUB SERPL-MCNC: 1.7 MG/DL (ref 0.2–1.3)
BILIRUB SERPL-MCNC: 2 MG/DL (ref 0.2–1.3)
BILIRUB SERPL-MCNC: 2.3 MG/DL (ref 0.2–1.3)
BILIRUB SERPL-MCNC: 2.6 MG/DL (ref 0.2–1.3)
BILIRUB UR QL STRIP: ABNORMAL
BILIRUB UR QL STRIP: NEGATIVE
BILIRUB UR QL STRIP: NEGATIVE
BLD GP AB SCN SERPL QL: NORMAL
BLD PROD TYP BPU: NORMAL
BLD UNIT ID BPU: 0
BLOOD BANK CMNT PATIENT-IMP: NORMAL
BLOOD PRODUCT CODE: NORMAL
BPU ID: NORMAL
BUN SERPL-MCNC: 13 MG/DL (ref 7–30)
BUN SERPL-MCNC: 15 MG/DL (ref 7–30)
BUN SERPL-MCNC: 17 MG/DL (ref 7–30)
BUN SERPL-MCNC: 17 MG/DL (ref 7–30)
BUN SERPL-MCNC: 19 MG/DL (ref 7–30)
BUN SERPL-MCNC: 21 MG/DL (ref 7–30)
BUN SERPL-MCNC: 22 MG/DL (ref 7–30)
BUN SERPL-MCNC: 24 MG/DL (ref 7–30)
BUN SERPL-MCNC: 25 MG/DL (ref 7–30)
BUN SERPL-MCNC: 27 MG/DL (ref 7–30)
BUN SERPL-MCNC: 30 MG/DL (ref 7–30)
BUN SERPL-MCNC: 32 MG/DL (ref 7–30)
BUN SERPL-MCNC: 36 MG/DL (ref 7–30)
BUN SERPL-MCNC: 37 MG/DL (ref 7–30)
BUN SERPL-MCNC: 39 MG/DL (ref 7–30)
BUN SERPL-MCNC: 43 MG/DL (ref 7–30)
BUN SERPL-MCNC: 44 MG/DL (ref 7–30)
BUN SERPL-MCNC: 44 MG/DL (ref 7–30)
BUN SERPL-MCNC: 48 MG/DL (ref 7–30)
BUN SERPL-MCNC: 55 MG/DL (ref 7–30)
BUN SERPL-MCNC: 62 MG/DL (ref 7–30)
BUN SERPL-MCNC: 64 MG/DL (ref 7–30)
BUN SERPL-MCNC: 68 MG/DL (ref 7–30)
BUN SERPL-MCNC: 72 MG/DL (ref 7–30)
BUN SERPL-MCNC: 8 MG/DL (ref 7–30)
C DIFF TOX B STL QL: NEGATIVE
CA-I BLD-SCNC: 3.8 MG/DL (ref 4.4–5.2)
CALCIUM SERPL-MCNC: 6.9 MG/DL (ref 8.5–10.1)
CALCIUM SERPL-MCNC: 7.1 MG/DL (ref 8.5–10.1)
CALCIUM SERPL-MCNC: 7.1 MG/DL (ref 8.5–10.1)
CALCIUM SERPL-MCNC: 7.2 MG/DL (ref 8.5–10.1)
CALCIUM SERPL-MCNC: 7.3 MG/DL (ref 8.5–10.1)
CALCIUM SERPL-MCNC: 7.4 MG/DL (ref 8.5–10.1)
CALCIUM SERPL-MCNC: 7.6 MG/DL (ref 8.5–10.1)
CALCIUM SERPL-MCNC: 7.9 MG/DL (ref 8.5–10.1)
CALCIUM SERPL-MCNC: 8 MG/DL (ref 8.5–10.1)
CALCIUM SERPL-MCNC: 8.1 MG/DL (ref 8.5–10.1)
CALCIUM SERPL-MCNC: 8.1 MG/DL (ref 8.5–10.1)
CALCIUM SERPL-MCNC: 8.2 MG/DL (ref 8.5–10.1)
CALCIUM SERPL-MCNC: 8.3 MG/DL (ref 8.5–10.1)
CALCIUM SERPL-MCNC: 8.4 MG/DL (ref 8.5–10.1)
CALCIUM SERPL-MCNC: 8.5 MG/DL (ref 8.5–10.1)
CALCIUM SERPL-MCNC: 8.6 MG/DL (ref 8.5–10.1)
CALCIUM SERPL-MCNC: 8.7 MG/DL (ref 8.5–10.1)
CALCIUM SERPL-MCNC: 8.7 MG/DL (ref 8.5–10.1)
CALCIUM SERPL-MCNC: 8.8 MG/DL (ref 8.5–10.1)
CHLORIDE BLD-SCNC: 92 MMOL/L (ref 94–109)
CHLORIDE SERPL-SCNC: 102 MMOL/L (ref 94–109)
CHLORIDE SERPL-SCNC: 103 MMOL/L (ref 94–109)
CHLORIDE SERPL-SCNC: 104 MMOL/L (ref 94–109)
CHLORIDE SERPL-SCNC: 105 MMOL/L (ref 94–109)
CHLORIDE SERPL-SCNC: 105 MMOL/L (ref 94–109)
CHLORIDE SERPL-SCNC: 106 MMOL/L (ref 94–109)
CHLORIDE SERPL-SCNC: 108 MMOL/L (ref 94–109)
CHLORIDE SERPL-SCNC: 108 MMOL/L (ref 94–109)
CHLORIDE SERPL-SCNC: 109 MMOL/L (ref 94–109)
CHLORIDE SERPL-SCNC: 110 MMOL/L (ref 94–109)
CHLORIDE SERPL-SCNC: 111 MMOL/L (ref 94–109)
CHLORIDE SERPL-SCNC: 112 MMOL/L (ref 94–109)
CHLORIDE SERPL-SCNC: 112 MMOL/L (ref 94–109)
CHLORIDE SERPL-SCNC: 113 MMOL/L (ref 94–109)
CHLORIDE SERPL-SCNC: 113 MMOL/L (ref 94–109)
CHLORIDE SERPL-SCNC: 114 MMOL/L (ref 94–109)
CHLORIDE SERPL-SCNC: 114 MMOL/L (ref 94–109)
CHLORIDE SERPL-SCNC: 119 MMOL/L (ref 94–109)
CHLORIDE SERPL-SCNC: 94 MMOL/L (ref 94–109)
CHOLEST SERPL-MCNC: 81 MG/DL
CK SERPL-CCNC: 84 U/L (ref 30–225)
CO2 BLD-SCNC: 22 MMOL/L (ref 20–32)
CO2 BLDCOV-SCNC: 23 MMOL/L (ref 21–28)
CO2 SERPL-SCNC: 13 MMOL/L (ref 20–32)
CO2 SERPL-SCNC: 15 MMOL/L (ref 20–32)
CO2 SERPL-SCNC: 15 MMOL/L (ref 20–32)
CO2 SERPL-SCNC: 16 MMOL/L (ref 20–32)
CO2 SERPL-SCNC: 16 MMOL/L (ref 20–32)
CO2 SERPL-SCNC: 17 MMOL/L (ref 20–32)
CO2 SERPL-SCNC: 19 MMOL/L (ref 20–32)
CO2 SERPL-SCNC: 20 MMOL/L (ref 20–32)
CO2 SERPL-SCNC: 21 MMOL/L (ref 20–32)
CO2 SERPL-SCNC: 22 MMOL/L (ref 20–32)
CO2 SERPL-SCNC: 23 MMOL/L (ref 20–32)
CO2 SERPL-SCNC: 24 MMOL/L (ref 20–32)
CO2 SERPL-SCNC: 24 MMOL/L (ref 20–32)
CO2 SERPL-SCNC: 26 MMOL/L (ref 20–32)
CO2 SERPL-SCNC: 26 MMOL/L (ref 20–32)
CO2 SERPL-SCNC: 27 MMOL/L (ref 20–32)
COLOR FLD: YELLOW
COLOR UR AUTO: ABNORMAL
COLOR UR AUTO: YELLOW
COLOR UR AUTO: YELLOW
CREAT BLD-MCNC: 4.9 MG/DL (ref 0.52–1.04)
CREAT SERPL-MCNC: 0.54 MG/DL (ref 0.52–1.04)
CREAT SERPL-MCNC: 0.71 MG/DL (ref 0.52–1.04)
CREAT SERPL-MCNC: 0.71 MG/DL (ref 0.52–1.04)
CREAT SERPL-MCNC: 0.88 MG/DL (ref 0.52–1.04)
CREAT SERPL-MCNC: 0.89 MG/DL (ref 0.52–1.04)
CREAT SERPL-MCNC: 1.03 MG/DL (ref 0.52–1.04)
CREAT SERPL-MCNC: 1.14 MG/DL (ref 0.52–1.04)
CREAT SERPL-MCNC: 1.21 MG/DL (ref 0.52–1.04)
CREAT SERPL-MCNC: 1.24 MG/DL (ref 0.52–1.04)
CREAT SERPL-MCNC: 1.26 MG/DL (ref 0.52–1.04)
CREAT SERPL-MCNC: 1.47 MG/DL (ref 0.52–1.04)
CREAT SERPL-MCNC: 1.69 MG/DL (ref 0.52–1.04)
CREAT SERPL-MCNC: 2.14 MG/DL (ref 0.52–1.04)
CREAT SERPL-MCNC: 2.39 MG/DL (ref 0.52–1.04)
CREAT SERPL-MCNC: 2.79 MG/DL (ref 0.52–1.04)
CREAT SERPL-MCNC: 2.98 MG/DL (ref 0.52–1.04)
CREAT SERPL-MCNC: 3.17 MG/DL (ref 0.52–1.04)
CREAT SERPL-MCNC: 3.26 MG/DL (ref 0.52–1.04)
CREAT SERPL-MCNC: 3.7 MG/DL (ref 0.52–1.04)
CREAT SERPL-MCNC: 4.04 MG/DL (ref 0.52–1.04)
CREAT SERPL-MCNC: 4.12 MG/DL (ref 0.52–1.04)
CREAT SERPL-MCNC: 4.26 MG/DL (ref 0.52–1.04)
CREAT SERPL-MCNC: 4.76 MG/DL (ref 0.52–1.04)
CREAT SERPL-MCNC: 5.44 MG/DL (ref 0.52–1.04)
CREAT SERPL-MCNC: 5.86 MG/DL (ref 0.52–1.04)
CREAT SERPL-MCNC: 6.21 MG/DL (ref 0.52–1.04)
CREAT SERPL-MCNC: 6.27 MG/DL (ref 0.52–1.04)
CREAT SERPL-MCNC: 6.44 MG/DL (ref 0.52–1.04)
CREAT UR-MCNC: 110 MG/DL
CREAT UR-MCNC: 111 MG/DL
CREAT UR-MCNC: 181 MG/DL
CREAT UR-MCNC: 92 MG/DL
DIFFERENTIAL METHOD BLD: ABNORMAL
EOSINOPHIL # BLD AUTO: 0 10E9/L (ref 0–0.7)
EOSINOPHIL # BLD AUTO: 0 10E9/L (ref 0–0.7)
EOSINOPHIL # BLD AUTO: 0.1 10E9/L (ref 0–0.7)
EOSINOPHIL # BLD AUTO: 0.1 10E9/L (ref 0–0.7)
EOSINOPHIL NFR BLD AUTO: 0.1 %
EOSINOPHIL NFR BLD AUTO: 0.2 %
EOSINOPHIL NFR BLD AUTO: 0.9 %
EOSINOPHIL NFR BLD AUTO: 0.9 %
ERYTHROCYTE [DISTWIDTH] IN BLOOD BY AUTOMATED COUNT: 16.8 % (ref 10–15)
ERYTHROCYTE [DISTWIDTH] IN BLOOD BY AUTOMATED COUNT: 16.9 % (ref 10–15)
ERYTHROCYTE [DISTWIDTH] IN BLOOD BY AUTOMATED COUNT: 16.9 % (ref 10–15)
ERYTHROCYTE [DISTWIDTH] IN BLOOD BY AUTOMATED COUNT: 17 % (ref 10–15)
ERYTHROCYTE [DISTWIDTH] IN BLOOD BY AUTOMATED COUNT: 17 % (ref 10–15)
ERYTHROCYTE [DISTWIDTH] IN BLOOD BY AUTOMATED COUNT: 17.2 % (ref 10–15)
ERYTHROCYTE [DISTWIDTH] IN BLOOD BY AUTOMATED COUNT: 17.3 % (ref 10–15)
ERYTHROCYTE [DISTWIDTH] IN BLOOD BY AUTOMATED COUNT: 17.4 % (ref 10–15)
ERYTHROCYTE [DISTWIDTH] IN BLOOD BY AUTOMATED COUNT: 17.5 % (ref 10–15)
ERYTHROCYTE [DISTWIDTH] IN BLOOD BY AUTOMATED COUNT: 17.7 % (ref 10–15)
ERYTHROCYTE [DISTWIDTH] IN BLOOD BY AUTOMATED COUNT: 18.1 % (ref 10–15)
ERYTHROCYTE [DISTWIDTH] IN BLOOD BY AUTOMATED COUNT: 18.2 % (ref 10–15)
ERYTHROCYTE [DISTWIDTH] IN BLOOD BY AUTOMATED COUNT: 19.2 % (ref 10–15)
ERYTHROCYTE [DISTWIDTH] IN BLOOD BY AUTOMATED COUNT: 19.4 % (ref 10–15)
ERYTHROCYTE [DISTWIDTH] IN BLOOD BY AUTOMATED COUNT: 20.5 % (ref 10–15)
ERYTHROCYTE [DISTWIDTH] IN BLOOD BY AUTOMATED COUNT: 21.2 % (ref 10–15)
ERYTHROCYTE [DISTWIDTH] IN BLOOD BY AUTOMATED COUNT: 21.2 % (ref 10–15)
ERYTHROCYTE [DISTWIDTH] IN BLOOD BY AUTOMATED COUNT: 21.3 % (ref 10–15)
ERYTHROCYTE [DISTWIDTH] IN BLOOD BY AUTOMATED COUNT: 21.4 % (ref 10–15)
ERYTHROCYTE [DISTWIDTH] IN BLOOD BY AUTOMATED COUNT: 21.4 % (ref 10–15)
ERYTHROCYTE [DISTWIDTH] IN BLOOD BY AUTOMATED COUNT: 21.5 % (ref 10–15)
ERYTHROCYTE [DISTWIDTH] IN BLOOD BY AUTOMATED COUNT: 21.6 % (ref 10–15)
ERYTHROCYTE [DISTWIDTH] IN BLOOD BY AUTOMATED COUNT: 21.6 % (ref 10–15)
ETHANOL SERPL-MCNC: <0.01 G/DL
ETHANOL SERPL-MCNC: <0.01 G/DL
FRACT EXCRET NA UR+SERPL-RTO: 0.2 %
FRACT EXCRET NA UR+SERPL-RTO: <0.1 %
GFR SERPL CREATININE-BSD FRML MDRD: 11 ML/MIN/{1.73_M2}
GFR SERPL CREATININE-BSD FRML MDRD: 13 ML/MIN/{1.73_M2}
GFR SERPL CREATININE-BSD FRML MDRD: 15 ML/MIN/{1.73_M2}
GFR SERPL CREATININE-BSD FRML MDRD: 15 ML/MIN/{1.73_M2}
GFR SERPL CREATININE-BSD FRML MDRD: 17 ML/MIN/{1.73_M2}
GFR SERPL CREATININE-BSD FRML MDRD: 18 ML/MIN/{1.73_M2}
GFR SERPL CREATININE-BSD FRML MDRD: 22 ML/MIN/{1.73_M2}
GFR SERPL CREATININE-BSD FRML MDRD: 25 ML/MIN/{1.73_M2}
GFR SERPL CREATININE-BSD FRML MDRD: 33 ML/MIN/{1.73_M2}
GFR SERPL CREATININE-BSD FRML MDRD: 39 ML/MIN/{1.73_M2}
GFR SERPL CREATININE-BSD FRML MDRD: 47 ML/MIN/{1.73_M2}
GFR SERPL CREATININE-BSD FRML MDRD: 48 ML/MIN/{1.73_M2}
GFR SERPL CREATININE-BSD FRML MDRD: 49 ML/MIN/{1.73_M2}
GFR SERPL CREATININE-BSD FRML MDRD: 53 ML/MIN/{1.73_M2}
GFR SERPL CREATININE-BSD FRML MDRD: 60 ML/MIN/{1.73_M2}
GFR SERPL CREATININE-BSD FRML MDRD: 7 ML/MIN/{1.73_M2}
GFR SERPL CREATININE-BSD FRML MDRD: 71 ML/MIN/{1.73_M2}
GFR SERPL CREATININE-BSD FRML MDRD: 73 ML/MIN/{1.73_M2}
GFR SERPL CREATININE-BSD FRML MDRD: 8 ML/MIN/{1.73_M2}
GFR SERPL CREATININE-BSD FRML MDRD: 9 ML/MIN/{1.73_M2}
GFR SERPL CREATININE-BSD FRML MDRD: 9 ML/MIN/{1.73_M2}
GFR SERPL CREATININE-BSD FRML MDRD: >90 ML/MIN/{1.73_M2}
GLUCOSE BLD-MCNC: 98 MG/DL (ref 70–99)
GLUCOSE BLDC GLUCOMTR-MCNC: 114 MG/DL (ref 70–99)
GLUCOSE BLDC GLUCOMTR-MCNC: 118 MG/DL (ref 70–99)
GLUCOSE BLDC GLUCOMTR-MCNC: 137 MG/DL (ref 70–99)
GLUCOSE BLDC GLUCOMTR-MCNC: 141 MG/DL (ref 70–99)
GLUCOSE BLDC GLUCOMTR-MCNC: 80 MG/DL (ref 70–99)
GLUCOSE BLDC GLUCOMTR-MCNC: 83 MG/DL (ref 70–99)
GLUCOSE BLDC GLUCOMTR-MCNC: 86 MG/DL (ref 70–99)
GLUCOSE BLDC GLUCOMTR-MCNC: 97 MG/DL (ref 70–99)
GLUCOSE BLDC GLUCOMTR-MCNC: 98 MG/DL (ref 70–99)
GLUCOSE SERPL-MCNC: 113 MG/DL (ref 70–99)
GLUCOSE SERPL-MCNC: 116 MG/DL (ref 70–99)
GLUCOSE SERPL-MCNC: 118 MG/DL (ref 70–99)
GLUCOSE SERPL-MCNC: 125 MG/DL (ref 70–99)
GLUCOSE SERPL-MCNC: 137 MG/DL (ref 70–99)
GLUCOSE SERPL-MCNC: 298 MG/DL (ref 70–99)
GLUCOSE SERPL-MCNC: 66 MG/DL (ref 70–99)
GLUCOSE SERPL-MCNC: 70 MG/DL (ref 70–99)
GLUCOSE SERPL-MCNC: 71 MG/DL (ref 70–99)
GLUCOSE SERPL-MCNC: 72 MG/DL (ref 70–99)
GLUCOSE SERPL-MCNC: 72 MG/DL (ref 70–99)
GLUCOSE SERPL-MCNC: 73 MG/DL (ref 70–99)
GLUCOSE SERPL-MCNC: 73 MG/DL (ref 70–99)
GLUCOSE SERPL-MCNC: 74 MG/DL (ref 70–99)
GLUCOSE SERPL-MCNC: 75 MG/DL (ref 70–99)
GLUCOSE SERPL-MCNC: 77 MG/DL (ref 70–99)
GLUCOSE SERPL-MCNC: 77 MG/DL (ref 70–99)
GLUCOSE SERPL-MCNC: 78 MG/DL (ref 70–99)
GLUCOSE SERPL-MCNC: 79 MG/DL (ref 70–99)
GLUCOSE SERPL-MCNC: 81 MG/DL (ref 70–99)
GLUCOSE SERPL-MCNC: 84 MG/DL (ref 70–99)
GLUCOSE SERPL-MCNC: 92 MG/DL (ref 70–99)
GLUCOSE SERPL-MCNC: 92 MG/DL (ref 70–99)
GLUCOSE SERPL-MCNC: 97 MG/DL (ref 70–99)
GLUCOSE SERPL-MCNC: 98 MG/DL (ref 70–99)
GLUCOSE SERPL-MCNC: 98 MG/DL (ref 70–99)
GLUCOSE UR STRIP-MCNC: NEGATIVE MG/DL
GRAM STN SPEC: NORMAL
HCT VFR BLD AUTO: 18.4 % (ref 35–47)
HCT VFR BLD AUTO: 20 % (ref 35–47)
HCT VFR BLD AUTO: 20.2 % (ref 35–47)
HCT VFR BLD AUTO: 20.6 % (ref 35–47)
HCT VFR BLD AUTO: 20.8 % (ref 35–47)
HCT VFR BLD AUTO: 21.5 % (ref 35–47)
HCT VFR BLD AUTO: 22.5 % (ref 35–47)
HCT VFR BLD AUTO: 22.6 % (ref 35–47)
HCT VFR BLD AUTO: 23.1 % (ref 35–47)
HCT VFR BLD AUTO: 23.4 % (ref 35–47)
HCT VFR BLD AUTO: 23.4 % (ref 35–47)
HCT VFR BLD AUTO: 23.9 % (ref 35–47)
HCT VFR BLD AUTO: 24 % (ref 35–47)
HCT VFR BLD AUTO: 24.1 % (ref 35–47)
HCT VFR BLD AUTO: 24.3 % (ref 35–47)
HCT VFR BLD AUTO: 24.3 % (ref 35–47)
HCT VFR BLD AUTO: 24.4 % (ref 35–47)
HCT VFR BLD AUTO: 24.4 % (ref 35–47)
HCT VFR BLD AUTO: 24.8 % (ref 35–47)
HCT VFR BLD AUTO: 25.3 % (ref 35–47)
HCT VFR BLD AUTO: 25.4 % (ref 35–47)
HCT VFR BLD AUTO: 25.4 % (ref 35–47)
HCT VFR BLD AUTO: 25.6 % (ref 35–47)
HCT VFR BLD AUTO: 26.2 % (ref 35–47)
HCT VFR BLD AUTO: 26.2 % (ref 35–47)
HCT VFR BLD AUTO: 27 % (ref 35–47)
HCT VFR BLD AUTO: 40.2 % (ref 35–47)
HCT VFR BLD CALC: 24 %PCV (ref 35–47)
HDLC SERPL-MCNC: 30 MG/DL
HEMOCCULT STL QL: POSITIVE
HGB BLD CALC-MCNC: 8.2 G/DL (ref 11.7–15.7)
HGB BLD-MCNC: 13.6 G/DL (ref 11.7–15.7)
HGB BLD-MCNC: 6 G/DL (ref 11.7–15.7)
HGB BLD-MCNC: 6.5 G/DL (ref 11.7–15.7)
HGB BLD-MCNC: 6.9 G/DL (ref 11.7–15.7)
HGB BLD-MCNC: 7 G/DL (ref 11.7–15.7)
HGB BLD-MCNC: 7.2 G/DL (ref 11.7–15.7)
HGB BLD-MCNC: 7.2 G/DL (ref 11.7–15.7)
HGB BLD-MCNC: 7.4 G/DL (ref 11.7–15.7)
HGB BLD-MCNC: 7.4 G/DL (ref 11.7–15.7)
HGB BLD-MCNC: 7.5 G/DL (ref 11.7–15.7)
HGB BLD-MCNC: 7.8 G/DL (ref 11.7–15.7)
HGB BLD-MCNC: 7.8 G/DL (ref 11.7–15.7)
HGB BLD-MCNC: 7.9 G/DL (ref 11.7–15.7)
HGB BLD-MCNC: 8 G/DL (ref 11.7–15.7)
HGB BLD-MCNC: 8.1 G/DL (ref 11.7–15.7)
HGB BLD-MCNC: 8.2 G/DL (ref 11.7–15.7)
HGB BLD-MCNC: 8.3 G/DL (ref 11.7–15.7)
HGB BLD-MCNC: 8.4 G/DL (ref 11.7–15.7)
HGB BLD-MCNC: 8.4 G/DL (ref 11.7–15.7)
HGB BLD-MCNC: 8.5 G/DL (ref 11.7–15.7)
HGB BLD-MCNC: 8.6 G/DL (ref 11.7–15.7)
HGB BLD-MCNC: 8.6 G/DL (ref 11.7–15.7)
HGB BLD-MCNC: 8.7 G/DL (ref 11.7–15.7)
HGB UR QL STRIP: ABNORMAL
HYALINE CASTS #/AREA URNS LPF: 19 /LPF (ref 0–2)
HYALINE CASTS #/AREA URNS LPF: 4 /LPF (ref 0–2)
IMM GRANULOCYTES # BLD: 0 10E9/L (ref 0–0.4)
IMM GRANULOCYTES # BLD: 0.1 10E9/L (ref 0–0.4)
IMM GRANULOCYTES NFR BLD: 0.1 %
IMM GRANULOCYTES NFR BLD: 0.4 %
INR PPP: 1.47 (ref 0.86–1.14)
INR PPP: 1.48 (ref 0.86–1.14)
INR PPP: 1.5 (ref 0.86–1.14)
INR PPP: 1.54 (ref 0.86–1.14)
INR PPP: 1.58 (ref 0.86–1.14)
INR PPP: 1.61 (ref 0.86–1.14)
INR PPP: 1.65 (ref 0.86–1.14)
INR PPP: 1.71 (ref 0.86–1.14)
INR PPP: 1.88 (ref 0.86–1.14)
INR PPP: 2.01 (ref 0.86–1.14)
INR PPP: 2.05 (ref 0.86–1.14)
INR PPP: 2.05 (ref 0.86–1.14)
INTERPRETATION ECG - MUSE: NORMAL
INTERPRETATION ECG - MUSE: NORMAL
KETONES UR STRIP-MCNC: ABNORMAL MG/DL
KETONES UR STRIP-MCNC: NEGATIVE MG/DL
KETONES UR STRIP-MCNC: NEGATIVE MG/DL
LACTATE BLD-SCNC: 1.2 MMOL/L (ref 0.7–2)
LACTATE BLD-SCNC: 1.3 MMOL/L (ref 0.7–2)
LACTATE BLD-SCNC: 1.4 MMOL/L (ref 0.7–2)
LACTATE BLD-SCNC: 1.5 MMOL/L (ref 0.7–2)
LACTATE BLD-SCNC: 1.6 MMOL/L (ref 0.7–2)
LACTATE BLD-SCNC: 1.6 MMOL/L (ref 0.7–2)
LACTATE BLD-SCNC: 2.1 MMOL/L (ref 0.7–2)
LACTATE BLD-SCNC: 4.2 MMOL/L (ref 0.7–2.1)
LACTATE BLD-SCNC: 4.4 MMOL/L (ref 0.7–2)
LDLC SERPL CALC-MCNC: 36 MG/DL
LEUKOCYTE ESTERASE UR QL STRIP: ABNORMAL
LYMPHOCYTES # BLD AUTO: 1.3 10E9/L (ref 0.8–5.3)
LYMPHOCYTES # BLD AUTO: 1.5 10E9/L (ref 0.8–5.3)
LYMPHOCYTES # BLD AUTO: 2.4 10E9/L (ref 0.8–5.3)
LYMPHOCYTES # BLD AUTO: 2.5 10E9/L (ref 0.8–5.3)
LYMPHOCYTES NFR BLD AUTO: 15.8 %
LYMPHOCYTES NFR BLD AUTO: 16.1 %
LYMPHOCYTES NFR BLD AUTO: 21.9 %
LYMPHOCYTES NFR BLD AUTO: 25.7 %
LYMPHOCYTES NFR FLD MANUAL: 28 %
LYMPHOCYTES NFR FLD MANUAL: 44 %
LYMPHOCYTES NFR FLD MANUAL: 48 %
Lab: ABNORMAL
Lab: NORMAL
Lab: NORMAL
MAGNESIUM SERPL-MCNC: 1.4 MG/DL (ref 1.6–2.3)
MAGNESIUM SERPL-MCNC: 1.5 MG/DL (ref 1.6–2.3)
MAGNESIUM SERPL-MCNC: 1.5 MG/DL (ref 1.6–2.3)
MAGNESIUM SERPL-MCNC: 1.6 MG/DL (ref 1.6–2.3)
MAGNESIUM SERPL-MCNC: 1.9 MG/DL (ref 1.6–2.3)
MAGNESIUM SERPL-MCNC: 1.9 MG/DL (ref 1.6–2.3)
MAGNESIUM SERPL-MCNC: 2.1 MG/DL (ref 1.6–2.3)
MAGNESIUM SERPL-MCNC: 2.1 MG/DL (ref 1.6–2.3)
MAGNESIUM SERPL-MCNC: 2.7 MG/DL (ref 1.6–2.3)
MCH RBC QN AUTO: 31.3 PG (ref 26.5–33)
MCH RBC QN AUTO: 31.6 PG (ref 26.5–33)
MCH RBC QN AUTO: 32.4 PG (ref 26.5–33)
MCH RBC QN AUTO: 32.5 PG (ref 26.5–33)
MCH RBC QN AUTO: 32.5 PG (ref 26.5–33)
MCH RBC QN AUTO: 32.7 PG (ref 26.5–33)
MCH RBC QN AUTO: 32.8 PG (ref 26.5–33)
MCH RBC QN AUTO: 32.9 PG (ref 26.5–33)
MCH RBC QN AUTO: 33.1 PG (ref 26.5–33)
MCH RBC QN AUTO: 33.9 PG (ref 26.5–33)
MCH RBC QN AUTO: 34 PG (ref 26.5–33)
MCH RBC QN AUTO: 34.4 PG (ref 26.5–33)
MCH RBC QN AUTO: 34.7 PG (ref 26.5–33)
MCH RBC QN AUTO: 35 PG (ref 26.5–33)
MCH RBC QN AUTO: 35.2 PG (ref 26.5–33)
MCH RBC QN AUTO: 35.3 PG (ref 26.5–33)
MCH RBC QN AUTO: 35.4 PG (ref 26.5–33)
MCH RBC QN AUTO: 35.4 PG (ref 26.5–33)
MCH RBC QN AUTO: 35.5 PG (ref 26.5–33)
MCH RBC QN AUTO: 35.5 PG (ref 26.5–33)
MCH RBC QN AUTO: 35.6 PG (ref 26.5–33)
MCH RBC QN AUTO: 35.7 PG (ref 26.5–33)
MCH RBC QN AUTO: 35.9 PG (ref 26.5–33)
MCH RBC QN AUTO: 36 PG (ref 26.5–33)
MCH RBC QN AUTO: 36.8 PG (ref 26.5–33)
MCH RBC QN AUTO: 36.9 PG (ref 26.5–33)
MCH RBC QN AUTO: 37.8 PG (ref 26.5–33)
MCHC RBC AUTO-ENTMCNC: 31.3 G/DL (ref 31.5–36.5)
MCHC RBC AUTO-ENTMCNC: 32.1 G/DL (ref 31.5–36.5)
MCHC RBC AUTO-ENTMCNC: 32.1 G/DL (ref 31.5–36.5)
MCHC RBC AUTO-ENTMCNC: 32.2 G/DL (ref 31.5–36.5)
MCHC RBC AUTO-ENTMCNC: 32.3 G/DL (ref 31.5–36.5)
MCHC RBC AUTO-ENTMCNC: 32.6 G/DL (ref 31.5–36.5)
MCHC RBC AUTO-ENTMCNC: 32.7 G/DL (ref 31.5–36.5)
MCHC RBC AUTO-ENTMCNC: 32.8 G/DL (ref 31.5–36.5)
MCHC RBC AUTO-ENTMCNC: 33.2 G/DL (ref 31.5–36.5)
MCHC RBC AUTO-ENTMCNC: 33.3 G/DL (ref 31.5–36.5)
MCHC RBC AUTO-ENTMCNC: 33.5 G/DL (ref 31.5–36.5)
MCHC RBC AUTO-ENTMCNC: 33.7 G/DL (ref 31.5–36.5)
MCHC RBC AUTO-ENTMCNC: 33.8 G/DL (ref 31.5–36.5)
MCHC RBC AUTO-ENTMCNC: 33.9 G/DL (ref 31.5–36.5)
MCHC RBC AUTO-ENTMCNC: 33.9 G/DL (ref 31.5–36.5)
MCHC RBC AUTO-ENTMCNC: 34 G/DL (ref 31.5–36.5)
MCHC RBC AUTO-ENTMCNC: 34 G/DL (ref 31.5–36.5)
MCHC RBC AUTO-ENTMCNC: 34.2 G/DL (ref 31.5–36.5)
MCHC RBC AUTO-ENTMCNC: 34.4 G/DL (ref 31.5–36.5)
MCHC RBC AUTO-ENTMCNC: 34.5 G/DL (ref 31.5–36.5)
MCHC RBC AUTO-ENTMCNC: 34.6 G/DL (ref 31.5–36.5)
MCHC RBC AUTO-ENTMCNC: 34.7 G/DL (ref 31.5–36.5)
MCHC RBC AUTO-ENTMCNC: 35 G/DL (ref 31.5–36.5)
MCV RBC AUTO: 101 FL (ref 78–100)
MCV RBC AUTO: 102 FL (ref 78–100)
MCV RBC AUTO: 103 FL (ref 78–100)
MCV RBC AUTO: 104 FL (ref 78–100)
MCV RBC AUTO: 105 FL (ref 78–100)
MCV RBC AUTO: 105 FL (ref 78–100)
MCV RBC AUTO: 106 FL (ref 78–100)
MCV RBC AUTO: 107 FL (ref 78–100)
MCV RBC AUTO: 108 FL (ref 78–100)
MCV RBC AUTO: 108 FL (ref 78–100)
MCV RBC AUTO: 109 FL (ref 78–100)
MCV RBC AUTO: 109 FL (ref 78–100)
MCV RBC AUTO: 112 FL (ref 78–100)
MCV RBC AUTO: 95 FL (ref 78–100)
MCV RBC AUTO: 95 FL (ref 78–100)
MCV RBC AUTO: 96 FL (ref 78–100)
MCV RBC AUTO: 96 FL (ref 78–100)
MCV RBC AUTO: 97 FL (ref 78–100)
MCV RBC AUTO: 97 FL (ref 78–100)
MICROALBUMIN UR-MCNC: 372 MG/L
MICROALBUMIN/CREAT UR: 335.14 MG/G CR (ref 0–25)
MONOCYTES # BLD AUTO: 0.6 10E9/L (ref 0–1.3)
MONOCYTES # BLD AUTO: 0.9 10E9/L (ref 0–1.3)
MONOCYTES # BLD AUTO: 1.1 10E9/L (ref 0–1.3)
MONOCYTES # BLD AUTO: 1.1 10E9/L (ref 0–1.3)
MONOCYTES NFR BLD AUTO: 10.8 %
MONOCYTES NFR BLD AUTO: 11.2 %
MONOCYTES NFR BLD AUTO: 7 %
MONOCYTES NFR BLD AUTO: 8.9 %
MONOS+MACROS NFR FLD MANUAL: 20 %
MONOS+MACROS NFR FLD MANUAL: 20 %
MONOS+MACROS NFR FLD MANUAL: 34 %
MUCOUS THREADS #/AREA URNS LPF: PRESENT /LPF
NEUTROPHILS # BLD AUTO: 11.7 10E9/L (ref 1.6–8.3)
NEUTROPHILS # BLD AUTO: 4.8 10E9/L (ref 1.6–8.3)
NEUTROPHILS # BLD AUTO: 5.8 10E9/L (ref 1.6–8.3)
NEUTROPHILS # BLD AUTO: 6.1 10E9/L (ref 1.6–8.3)
NEUTROPHILS NFR BLD AUTO: 61.4 %
NEUTROPHILS NFR BLD AUTO: 67.9 %
NEUTROPHILS NFR BLD AUTO: 72.7 %
NEUTROPHILS NFR BLD AUTO: 76.3 %
NEUTS BAND NFR FLD MANUAL: 21 %
NEUTS BAND NFR FLD MANUAL: 7 %
NEUTS BAND NFR FLD MANUAL: 8 %
NITRATE UR QL: NEGATIVE
NONHDLC SERPL-MCNC: 51 MG/DL
NRBC # BLD AUTO: 0 10*3/UL
NRBC BLD AUTO-RTO: 0 /100
NT-PROBNP SERPL-MCNC: 1510 PG/ML (ref 0–125)
NT-PROBNP SERPL-MCNC: 5689 PG/ML (ref 0–900)
NUM BPU REQUESTED: 1
OTHER CELLS FLD MANUAL: 11 %
OTHER CELLS FLD MANUAL: 29 %
OTHER CELLS FLD MANUAL: 29 %
PAIN DRUG SCR UR W RPTD MEDS: NORMAL
PCO2 BLDV: 32 MM HG (ref 40–50)
PH BLDV: 7.46 PH (ref 7.32–7.43)
PH UR STRIP: 5.5 PH (ref 5–7)
PHOSPHATE SERPL-MCNC: 0.6 MG/DL (ref 2.5–4.5)
PHOSPHATE SERPL-MCNC: 2.3 MG/DL (ref 2.5–4.5)
PHOSPHATE SERPL-MCNC: 2.4 MG/DL (ref 2.5–4.5)
PHOSPHATE SERPL-MCNC: 3.6 MG/DL (ref 2.5–4.5)
PHOSPHATE SERPL-MCNC: 4 MG/DL (ref 2.5–4.5)
PHOSPHATE SERPL-MCNC: 4.2 MG/DL (ref 2.5–4.5)
PHOSPHATE SERPL-MCNC: 4.4 MG/DL (ref 2.5–4.5)
PHOSPHATE SERPL-MCNC: 6 MG/DL (ref 2.5–4.5)
PHOSPHATE SERPL-MCNC: 6.8 MG/DL (ref 2.5–4.5)
PLATELET # BLD AUTO: 100 10E9/L (ref 150–450)
PLATELET # BLD AUTO: 102 10E9/L (ref 150–450)
PLATELET # BLD AUTO: 106 10E9/L (ref 150–450)
PLATELET # BLD AUTO: 108 10E9/L (ref 150–450)
PLATELET # BLD AUTO: 109 10E9/L (ref 150–450)
PLATELET # BLD AUTO: 110 10E9/L (ref 150–450)
PLATELET # BLD AUTO: 111 10E9/L (ref 150–450)
PLATELET # BLD AUTO: 127 10E9/L (ref 150–450)
PLATELET # BLD AUTO: 138 10E9/L (ref 150–450)
PLATELET # BLD AUTO: 142 10E9/L (ref 150–450)
PLATELET # BLD AUTO: 147 10E9/L (ref 150–450)
PLATELET # BLD AUTO: 160 10E9/L (ref 150–450)
PLATELET # BLD AUTO: 178 10E9/L (ref 150–450)
PLATELET # BLD AUTO: 185 10E9/L (ref 150–450)
PLATELET # BLD AUTO: 47 10E9/L (ref 150–450)
PLATELET # BLD AUTO: 51 10E9/L (ref 150–450)
PLATELET # BLD AUTO: 53 10E9/L (ref 150–450)
PLATELET # BLD AUTO: 53 10E9/L (ref 150–450)
PLATELET # BLD AUTO: 56 10E9/L (ref 150–450)
PLATELET # BLD AUTO: 57 10E9/L (ref 150–450)
PLATELET # BLD AUTO: 62 10E9/L (ref 150–450)
PLATELET # BLD AUTO: 63 10E9/L (ref 150–450)
PLATELET # BLD AUTO: 67 10E9/L (ref 150–450)
PLATELET # BLD AUTO: 71 10E9/L (ref 150–450)
PLATELET # BLD AUTO: 72 10E9/L (ref 150–450)
PLATELET # BLD AUTO: 75 10E9/L (ref 150–450)
PLATELET # BLD AUTO: 78 10E9/L (ref 150–450)
PLATELET # BLD EST: ABNORMAL 10*3/UL
PO2 BLDV: 23 MM HG (ref 25–47)
POLYCHROMASIA BLD QL SMEAR: SLIGHT
POTASSIUM BLD-SCNC: 2.8 MMOL/L (ref 3.4–5.3)
POTASSIUM SERPL-SCNC: 2.5 MMOL/L (ref 3.4–5.3)
POTASSIUM SERPL-SCNC: 2.8 MMOL/L (ref 3.4–5.3)
POTASSIUM SERPL-SCNC: 2.8 MMOL/L (ref 3.4–5.3)
POTASSIUM SERPL-SCNC: 2.9 MMOL/L (ref 3.4–5.3)
POTASSIUM SERPL-SCNC: 3 MMOL/L (ref 3.4–5.3)
POTASSIUM SERPL-SCNC: 3.3 MMOL/L (ref 3.4–5.3)
POTASSIUM SERPL-SCNC: 3.4 MMOL/L (ref 3.4–5.3)
POTASSIUM SERPL-SCNC: 3.5 MMOL/L (ref 3.4–5.3)
POTASSIUM SERPL-SCNC: 3.6 MMOL/L (ref 3.4–5.3)
POTASSIUM SERPL-SCNC: 3.7 MMOL/L (ref 3.4–5.3)
POTASSIUM SERPL-SCNC: 3.8 MMOL/L (ref 3.4–5.3)
POTASSIUM SERPL-SCNC: 3.8 MMOL/L (ref 3.4–5.3)
POTASSIUM SERPL-SCNC: 3.9 MMOL/L (ref 3.4–5.3)
POTASSIUM SERPL-SCNC: 4.2 MMOL/L (ref 3.4–5.3)
POTASSIUM SERPL-SCNC: 4.4 MMOL/L (ref 3.4–5.3)
POTASSIUM SERPL-SCNC: 4.6 MMOL/L (ref 3.4–5.3)
POTASSIUM SERPL-SCNC: 4.6 MMOL/L (ref 3.4–5.3)
POTASSIUM SERPL-SCNC: 4.9 MMOL/L (ref 3.4–5.3)
POTASSIUM SERPL-SCNC: 5.1 MMOL/L (ref 3.4–5.3)
PROT FLD-MCNC: 0.9 G/DL
PROT SERPL-MCNC: 5.9 G/DL (ref 6.8–8.8)
PROT SERPL-MCNC: 6.1 G/DL (ref 6.8–8.8)
PROT SERPL-MCNC: 6.2 G/DL (ref 6.8–8.8)
PROT SERPL-MCNC: 6.4 G/DL (ref 6.8–8.8)
PROT SERPL-MCNC: 6.5 G/DL (ref 6.8–8.8)
PROT SERPL-MCNC: 6.7 G/DL (ref 6.8–8.8)
PROT SERPL-MCNC: 6.7 G/DL (ref 6.8–8.8)
PROT SERPL-MCNC: 6.8 G/DL (ref 6.8–8.8)
PROT SERPL-MCNC: 6.9 G/DL (ref 6.8–8.8)
PROT SERPL-MCNC: 6.9 G/DL (ref 6.8–8.8)
PROT SERPL-MCNC: 7 G/DL (ref 6.8–8.8)
PROT SERPL-MCNC: 7 G/DL (ref 6.8–8.8)
PROT SERPL-MCNC: 7.2 G/DL (ref 6.8–8.8)
PROT SERPL-MCNC: 8 G/DL (ref 6.8–8.8)
RBC # BLD AUTO: 1.87 10E12/L (ref 3.8–5.2)
RBC # BLD AUTO: 1.92 10E12/L (ref 3.8–5.2)
RBC # BLD AUTO: 1.98 10E12/L (ref 3.8–5.2)
RBC # BLD AUTO: 2.09 10E12/L (ref 3.8–5.2)
RBC # BLD AUTO: 2.12 10E12/L (ref 3.8–5.2)
RBC # BLD AUTO: 2.13 10E12/L (ref 3.8–5.2)
RBC # BLD AUTO: 2.19 10E12/L (ref 3.8–5.2)
RBC # BLD AUTO: 2.21 10E12/L (ref 3.8–5.2)
RBC # BLD AUTO: 2.26 10E12/L (ref 3.8–5.2)
RBC # BLD AUTO: 2.28 10E12/L (ref 3.8–5.2)
RBC # BLD AUTO: 2.29 10E12/L (ref 3.8–5.2)
RBC # BLD AUTO: 2.3 10E12/L (ref 3.8–5.2)
RBC # BLD AUTO: 2.32 10E12/L (ref 3.8–5.2)
RBC # BLD AUTO: 2.34 10E12/L (ref 3.8–5.2)
RBC # BLD AUTO: 2.37 10E12/L (ref 3.8–5.2)
RBC # BLD AUTO: 2.37 10E12/L (ref 3.8–5.2)
RBC # BLD AUTO: 2.39 10E12/L (ref 3.8–5.2)
RBC # BLD AUTO: 2.4 10E12/L (ref 3.8–5.2)
RBC # BLD AUTO: 2.41 10E12/L (ref 3.8–5.2)
RBC # BLD AUTO: 2.41 10E12/L (ref 3.8–5.2)
RBC # BLD AUTO: 2.48 10E12/L (ref 3.8–5.2)
RBC # BLD AUTO: 2.51 10E12/L (ref 3.8–5.2)
RBC # BLD AUTO: 2.52 10E12/L (ref 3.8–5.2)
RBC # BLD AUTO: 2.59 10E12/L (ref 3.8–5.2)
RBC # BLD AUTO: 3.7 10E12/L (ref 3.8–5.2)
RBC #/AREA URNS AUTO: 18 /HPF (ref 0–2)
RBC #/AREA URNS AUTO: 32 /HPF (ref 0–2)
RBC #/AREA URNS AUTO: 4 /HPF (ref 0–2)
SAO2 % BLDV FROM PO2: 45 %
SODIUM BLD-SCNC: 133 MMOL/L (ref 133–144)
SODIUM SERPL-SCNC: 129 MMOL/L (ref 133–144)
SODIUM SERPL-SCNC: 129 MMOL/L (ref 133–144)
SODIUM SERPL-SCNC: 130 MMOL/L (ref 133–144)
SODIUM SERPL-SCNC: 132 MMOL/L (ref 133–144)
SODIUM SERPL-SCNC: 133 MMOL/L (ref 133–144)
SODIUM SERPL-SCNC: 135 MMOL/L (ref 133–144)
SODIUM SERPL-SCNC: 136 MMOL/L (ref 133–144)
SODIUM SERPL-SCNC: 137 MMOL/L (ref 133–144)
SODIUM SERPL-SCNC: 138 MMOL/L (ref 133–144)
SODIUM SERPL-SCNC: 139 MMOL/L (ref 133–144)
SODIUM SERPL-SCNC: 140 MMOL/L (ref 133–144)
SODIUM SERPL-SCNC: 140 MMOL/L (ref 133–144)
SODIUM SERPL-SCNC: 141 MMOL/L (ref 133–144)
SODIUM SERPL-SCNC: 141 MMOL/L (ref 133–144)
SODIUM SERPL-SCNC: 142 MMOL/L (ref 133–144)
SODIUM SERPL-SCNC: 145 MMOL/L (ref 133–144)
SODIUM SERPL-SCNC: 145 MMOL/L (ref 133–144)
SODIUM SERPL-SCNC: 148 MMOL/L (ref 133–144)
SODIUM UR-SCNC: 55 MMOL/L
SODIUM UR-SCNC: 6 MMOL/L
SODIUM UR-SCNC: 9 MMOL/L
SOURCE: ABNORMAL
SP GR UR STRIP: 1.01 (ref 1–1.03)
SP GR UR STRIP: 1.01 (ref 1–1.03)
SP GR UR STRIP: 1.02 (ref 1–1.03)
SPECIMEN EXP DATE BLD: NORMAL
SPECIMEN SOURCE FLD: NORMAL
SPECIMEN SOURCE: ABNORMAL
SPECIMEN SOURCE: NORMAL
SQUAMOUS #/AREA URNS AUTO: 1 /HPF (ref 0–1)
SQUAMOUS #/AREA URNS AUTO: 10 /HPF (ref 0–1)
SQUAMOUS #/AREA URNS AUTO: 5 /HPF (ref 0–1)
T4 FREE SERPL-MCNC: 1.26 NG/DL (ref 0.76–1.46)
TRANSFUSION STATUS PATIENT QL: NORMAL
TRIGL SERPL-MCNC: 75 MG/DL
TSH SERPL DL<=0.005 MIU/L-ACNC: 2.2 MU/L (ref 0.4–4)
TSH SERPL DL<=0.005 MIU/L-ACNC: 4.58 MU/L (ref 0.4–4)
UROBILINOGEN UR STRIP-MCNC: 2 MG/DL (ref 0–2)
UROBILINOGEN UR STRIP-MCNC: 3 MG/DL (ref 0–2)
UROBILINOGEN UR STRIP-MCNC: NORMAL MG/DL (ref 0–2)
WBC # BLD AUTO: 10.2 10E9/L (ref 4–11)
WBC # BLD AUTO: 11.5 10E9/L (ref 4–11)
WBC # BLD AUTO: 11.7 10E9/L (ref 4–11)
WBC # BLD AUTO: 11.9 10E9/L (ref 4–11)
WBC # BLD AUTO: 12 10E9/L (ref 4–11)
WBC # BLD AUTO: 12.8 10E9/L (ref 4–11)
WBC # BLD AUTO: 13.2 10E9/L (ref 4–11)
WBC # BLD AUTO: 14.2 10E9/L (ref 4–11)
WBC # BLD AUTO: 15.3 10E9/L (ref 4–11)
WBC # BLD AUTO: 5.9 10E9/L (ref 4–11)
WBC # BLD AUTO: 6.2 10E9/L (ref 4–11)
WBC # BLD AUTO: 6.2 10E9/L (ref 4–11)
WBC # BLD AUTO: 6.3 10E9/L (ref 4–11)
WBC # BLD AUTO: 6.5 10E9/L (ref 4–11)
WBC # BLD AUTO: 6.6 10E9/L (ref 4–11)
WBC # BLD AUTO: 7 10E9/L (ref 4–11)
WBC # BLD AUTO: 7.1 10E9/L (ref 4–11)
WBC # BLD AUTO: 7.4 10E9/L (ref 4–11)
WBC # BLD AUTO: 8.1 10E9/L (ref 4–11)
WBC # BLD AUTO: 8.2 10E9/L (ref 4–11)
WBC # BLD AUTO: 8.3 10E9/L (ref 4–11)
WBC # BLD AUTO: 8.4 10E9/L (ref 4–11)
WBC # BLD AUTO: 8.4 10E9/L (ref 4–11)
WBC # BLD AUTO: 9.4 10E9/L (ref 4–11)
WBC # BLD AUTO: 9.5 10E9/L (ref 4–11)
WBC # BLD AUTO: 9.6 10E9/L (ref 4–11)
WBC # BLD AUTO: 9.8 10E9/L (ref 4–11)
WBC # FLD AUTO: 111 /UL
WBC # FLD AUTO: 148 /UL
WBC # FLD AUTO: 81 /UL
WBC #/AREA URNS AUTO: 12 /HPF (ref 0–5)
WBC #/AREA URNS AUTO: 40 /HPF (ref 0–5)
WBC #/AREA URNS AUTO: >182 /HPF (ref 0–5)
WBC CLUMPS #/AREA URNS HPF: PRESENT /HPF

## 2019-01-01 PROCEDURE — 80076 HEPATIC FUNCTION PANEL: CPT | Performed by: INTERNAL MEDICINE

## 2019-01-01 PROCEDURE — 85027 COMPLETE CBC AUTOMATED: CPT | Performed by: HOSPITALIST

## 2019-01-01 PROCEDURE — 82248 BILIRUBIN DIRECT: CPT | Performed by: HOSPITALIST

## 2019-01-01 PROCEDURE — 82570 ASSAY OF URINE CREATININE: CPT | Performed by: INTERNAL MEDICINE

## 2019-01-01 PROCEDURE — 83605 ASSAY OF LACTIC ACID: CPT | Performed by: INTERNAL MEDICINE

## 2019-01-01 PROCEDURE — 71045 X-RAY EXAM CHEST 1 VIEW: CPT

## 2019-01-01 PROCEDURE — 80048 BASIC METABOLIC PNL TOTAL CA: CPT | Performed by: INTERNAL MEDICINE

## 2019-01-01 PROCEDURE — 83735 ASSAY OF MAGNESIUM: CPT | Performed by: INTERNAL MEDICINE

## 2019-01-01 PROCEDURE — 97535 SELF CARE MNGMENT TRAINING: CPT | Mod: GO

## 2019-01-01 PROCEDURE — 25000125 ZZHC RX 250: Performed by: EMERGENCY MEDICINE

## 2019-01-01 PROCEDURE — 99285 EMERGENCY DEPT VISIT HI MDM: CPT | Mod: 25

## 2019-01-01 PROCEDURE — 25000132 ZZH RX MED GY IP 250 OP 250 PS 637: Performed by: PHYSICIAN ASSISTANT

## 2019-01-01 PROCEDURE — 36415 COLL VENOUS BLD VENIPUNCTURE: CPT | Performed by: INTERNAL MEDICINE

## 2019-01-01 PROCEDURE — 25000132 ZZH RX MED GY IP 250 OP 250 PS 637: Performed by: INTERNAL MEDICINE

## 2019-01-01 PROCEDURE — 99000 SPECIMEN HANDLING OFFICE-LAB: CPT | Performed by: FAMILY MEDICINE

## 2019-01-01 PROCEDURE — 25800030 ZZH RX IP 258 OP 636: Performed by: INTERNAL MEDICINE

## 2019-01-01 PROCEDURE — 25000125 ZZHC RX 250: Performed by: INTERNAL MEDICINE

## 2019-01-01 PROCEDURE — 92526 ORAL FUNCTION THERAPY: CPT | Mod: GN

## 2019-01-01 PROCEDURE — 25000128 H RX IP 250 OP 636: Performed by: INTERNAL MEDICINE

## 2019-01-01 PROCEDURE — 25000132 ZZH RX MED GY IP 250 OP 250 PS 637: Performed by: NURSE PRACTITIONER

## 2019-01-01 PROCEDURE — 85027 COMPLETE CBC AUTOMATED: CPT | Performed by: INTERNAL MEDICINE

## 2019-01-01 PROCEDURE — 83880 ASSAY OF NATRIURETIC PEPTIDE: CPT | Performed by: FAMILY MEDICINE

## 2019-01-01 PROCEDURE — 00000146 ZZHCL STATISTIC GLUCOSE BY METER IP

## 2019-01-01 PROCEDURE — 99213 OFFICE O/P EST LOW 20 MIN: CPT | Performed by: NURSE PRACTITIONER

## 2019-01-01 PROCEDURE — 85610 PROTHROMBIN TIME: CPT | Performed by: INTERNAL MEDICINE

## 2019-01-01 PROCEDURE — 36415 COLL VENOUS BLD VENIPUNCTURE: CPT | Performed by: PHYSICIAN ASSISTANT

## 2019-01-01 PROCEDURE — 84439 ASSAY OF FREE THYROXINE: CPT | Performed by: FAMILY MEDICINE

## 2019-01-01 PROCEDURE — 80053 COMPREHEN METABOLIC PANEL: CPT | Performed by: PHYSICIAN ASSISTANT

## 2019-01-01 PROCEDURE — 70450 CT HEAD/BRAIN W/O DYE: CPT | Mod: XS

## 2019-01-01 PROCEDURE — 99223 1ST HOSP IP/OBS HIGH 75: CPT | Mod: AI | Performed by: INTERNAL MEDICINE

## 2019-01-01 PROCEDURE — 27210429 ZZH NUTRITION PRODUCT INTERMEDIATE LITER

## 2019-01-01 PROCEDURE — 99233 SBSQ HOSP IP/OBS HIGH 50: CPT | Performed by: INTERNAL MEDICINE

## 2019-01-01 PROCEDURE — P9047 ALBUMIN (HUMAN), 25%, 50ML: HCPCS | Performed by: HOSPITALIST

## 2019-01-01 PROCEDURE — 84100 ASSAY OF PHOSPHORUS: CPT | Performed by: INTERNAL MEDICINE

## 2019-01-01 PROCEDURE — 96361 HYDRATE IV INFUSION ADD-ON: CPT | Mod: 59

## 2019-01-01 PROCEDURE — 86923 COMPATIBILITY TEST ELECTRIC: CPT | Performed by: INTERNAL MEDICINE

## 2019-01-01 PROCEDURE — 82140 ASSAY OF AMMONIA: CPT | Performed by: EMERGENCY MEDICINE

## 2019-01-01 PROCEDURE — 25000132 ZZH RX MED GY IP 250 OP 250 PS 637

## 2019-01-01 PROCEDURE — 12000000 ZZH R&B MED SURG/OB

## 2019-01-01 PROCEDURE — 99232 SBSQ HOSP IP/OBS MODERATE 35: CPT | Performed by: INTERNAL MEDICINE

## 2019-01-01 PROCEDURE — 40000863 ZZH STATISTIC RADIOLOGY XRAY, US, CT, MAR, NM

## 2019-01-01 PROCEDURE — 92526 ORAL FUNCTION THERAPY: CPT | Mod: GN | Performed by: SPEECH-LANGUAGE PATHOLOGIST

## 2019-01-01 PROCEDURE — 97530 THERAPEUTIC ACTIVITIES: CPT | Mod: GO

## 2019-01-01 PROCEDURE — 21000000 ZZH R&B IMCU HEART CARE

## 2019-01-01 PROCEDURE — 40000141 ZZH STATISTIC PERIPHERAL IV START W/O US GUIDANCE

## 2019-01-01 PROCEDURE — 85018 HEMOGLOBIN: CPT | Performed by: INTERNAL MEDICINE

## 2019-01-01 PROCEDURE — 84300 ASSAY OF URINE SODIUM: CPT | Performed by: INTERNAL MEDICINE

## 2019-01-01 PROCEDURE — 97116 GAIT TRAINING THERAPY: CPT | Mod: GP | Performed by: PHYSICAL THERAPIST

## 2019-01-01 PROCEDURE — 97530 THERAPEUTIC ACTIVITIES: CPT | Mod: GP

## 2019-01-01 PROCEDURE — 99207 ZZC CDG-MDM COMPONENT: MEETS LOW - DOWN CODED: CPT | Performed by: INTERNAL MEDICINE

## 2019-01-01 PROCEDURE — 82140 ASSAY OF AMMONIA: CPT | Performed by: HOSPITALIST

## 2019-01-01 PROCEDURE — 85610 PROTHROMBIN TIME: CPT | Performed by: PHYSICIAN ASSISTANT

## 2019-01-01 PROCEDURE — 82570 ASSAY OF URINE CREATININE: CPT | Performed by: HOSPITALIST

## 2019-01-01 PROCEDURE — P9047 ALBUMIN (HUMAN), 25%, 50ML: HCPCS | Performed by: INTERNAL MEDICINE

## 2019-01-01 PROCEDURE — 25000132 ZZH RX MED GY IP 250 OP 250 PS 637: Performed by: EMERGENCY MEDICINE

## 2019-01-01 PROCEDURE — 96376 TX/PRO/DX INJ SAME DRUG ADON: CPT

## 2019-01-01 PROCEDURE — 97535 SELF CARE MNGMENT TRAINING: CPT | Mod: GO | Performed by: REHABILITATION PRACTITIONER

## 2019-01-01 PROCEDURE — 87205 SMEAR GRAM STAIN: CPT | Performed by: PHYSICIAN ASSISTANT

## 2019-01-01 PROCEDURE — 89051 BODY FLUID CELL COUNT: CPT | Performed by: EMERGENCY MEDICINE

## 2019-01-01 PROCEDURE — 25800030 ZZH RX IP 258 OP 636: Performed by: EMERGENCY MEDICINE

## 2019-01-01 PROCEDURE — 27210190 US PARACENTESIS

## 2019-01-01 PROCEDURE — 80061 LIPID PANEL: CPT | Performed by: FAMILY MEDICINE

## 2019-01-01 PROCEDURE — 83605 ASSAY OF LACTIC ACID: CPT

## 2019-01-01 PROCEDURE — 85027 COMPLETE CBC AUTOMATED: CPT | Performed by: PHYSICIAN ASSISTANT

## 2019-01-01 PROCEDURE — 81001 URINALYSIS AUTO W/SCOPE: CPT | Performed by: INTERNAL MEDICINE

## 2019-01-01 PROCEDURE — 84100 ASSAY OF PHOSPHORUS: CPT | Performed by: PHYSICIAN ASSISTANT

## 2019-01-01 PROCEDURE — 25800030 ZZH RX IP 258 OP 636: Performed by: HOSPITALIST

## 2019-01-01 PROCEDURE — 84157 ASSAY OF PROTEIN OTHER: CPT | Performed by: EMERGENCY MEDICINE

## 2019-01-01 PROCEDURE — 83735 ASSAY OF MAGNESIUM: CPT | Performed by: HOSPITALIST

## 2019-01-01 PROCEDURE — 83880 ASSAY OF NATRIURETIC PEPTIDE: CPT | Performed by: NURSE PRACTITIONER

## 2019-01-01 PROCEDURE — 89051 BODY FLUID CELL COUNT: CPT | Performed by: INTERNAL MEDICINE

## 2019-01-01 PROCEDURE — 99292 CRITICAL CARE ADDL 30 MIN: CPT

## 2019-01-01 PROCEDURE — 84100 ASSAY OF PHOSPHORUS: CPT | Performed by: HOSPITALIST

## 2019-01-01 PROCEDURE — 97116 GAIT TRAINING THERAPY: CPT | Mod: GP

## 2019-01-01 PROCEDURE — 76705 ECHO EXAM OF ABDOMEN: CPT

## 2019-01-01 PROCEDURE — 85025 COMPLETE CBC W/AUTO DIFF WBC: CPT | Performed by: PHYSICIAN ASSISTANT

## 2019-01-01 PROCEDURE — 87186 SC STD MICRODIL/AGAR DIL: CPT | Performed by: INTERNAL MEDICINE

## 2019-01-01 PROCEDURE — 82550 ASSAY OF CK (CPK): CPT | Performed by: STUDENT IN AN ORGANIZED HEALTH CARE EDUCATION/TRAINING PROGRAM

## 2019-01-01 PROCEDURE — 97530 THERAPEUTIC ACTIVITIES: CPT | Mod: GO | Performed by: OCCUPATIONAL THERAPY ASSISTANT

## 2019-01-01 PROCEDURE — 86900 BLOOD TYPING SEROLOGIC ABO: CPT | Performed by: INTERNAL MEDICINE

## 2019-01-01 PROCEDURE — 97162 PT EVAL MOD COMPLEX 30 MIN: CPT | Mod: GP | Performed by: PHYSICAL THERAPIST

## 2019-01-01 PROCEDURE — 83605 ASSAY OF LACTIC ACID: CPT | Performed by: EMERGENCY MEDICINE

## 2019-01-01 PROCEDURE — 80076 HEPATIC FUNCTION PANEL: CPT | Performed by: NURSE PRACTITIONER

## 2019-01-01 PROCEDURE — 86850 RBC ANTIBODY SCREEN: CPT | Performed by: INTERNAL MEDICINE

## 2019-01-01 PROCEDURE — 84443 ASSAY THYROID STIM HORMONE: CPT | Performed by: EMERGENCY MEDICINE

## 2019-01-01 PROCEDURE — 82043 UR ALBUMIN QUANTITATIVE: CPT | Performed by: INTERNAL MEDICINE

## 2019-01-01 PROCEDURE — 96365 THER/PROPH/DIAG IV INF INIT: CPT

## 2019-01-01 PROCEDURE — 80307 DRUG TEST PRSMV CHEM ANLYZR: CPT | Mod: 90 | Performed by: FAMILY MEDICINE

## 2019-01-01 PROCEDURE — 87070 CULTURE OTHR SPECIMN AEROBIC: CPT | Performed by: PHYSICIAN ASSISTANT

## 2019-01-01 PROCEDURE — 99207 ZZC APP CREDIT; MD BILLING SHARED VISIT: CPT | Performed by: PHYSICIAN ASSISTANT

## 2019-01-01 PROCEDURE — 96360 HYDRATION IV INFUSION INIT: CPT

## 2019-01-01 PROCEDURE — 25000132 ZZH RX MED GY IP 250 OP 250 PS 637: Performed by: HOSPITALIST

## 2019-01-01 PROCEDURE — 80053 COMPREHEN METABOLIC PANEL: CPT | Performed by: FAMILY MEDICINE

## 2019-01-01 PROCEDURE — 25000125 ZZHC RX 250: Performed by: RADIOLOGY

## 2019-01-01 PROCEDURE — 99239 HOSP IP/OBS DSCHRG MGMT >30: CPT | Performed by: INTERNAL MEDICINE

## 2019-01-01 PROCEDURE — 36415 COLL VENOUS BLD VENIPUNCTURE: CPT | Performed by: FAMILY MEDICINE

## 2019-01-01 PROCEDURE — 97530 THERAPEUTIC ACTIVITIES: CPT | Mod: GP | Performed by: PHYSICAL THERAPIST

## 2019-01-01 PROCEDURE — 25800025 ZZH RX 258: Performed by: INTERNAL MEDICINE

## 2019-01-01 PROCEDURE — 81001 URINALYSIS AUTO W/SCOPE: CPT | Performed by: EMERGENCY MEDICINE

## 2019-01-01 PROCEDURE — 99215 OFFICE O/P EST HI 40 MIN: CPT | Performed by: FAMILY MEDICINE

## 2019-01-01 PROCEDURE — 85025 COMPLETE CBC W/AUTO DIFF WBC: CPT | Performed by: EMERGENCY MEDICINE

## 2019-01-01 PROCEDURE — 99214 OFFICE O/P EST MOD 30 MIN: CPT | Performed by: FAMILY MEDICINE

## 2019-01-01 PROCEDURE — 51702 INSERT TEMP BLADDER CATH: CPT | Mod: 59

## 2019-01-01 PROCEDURE — 0W9G3ZX DRAINAGE OF PERITONEAL CAVITY, PERCUTANEOUS APPROACH, DIAGNOSTIC: ICD-10-PCS | Performed by: RADIOLOGY

## 2019-01-01 PROCEDURE — H0001 ALCOHOL AND/OR DRUG ASSESS: HCPCS | Mod: HF | Performed by: COUNSELOR

## 2019-01-01 PROCEDURE — 85610 PROTHROMBIN TIME: CPT | Performed by: STUDENT IN AN ORGANIZED HEALTH CARE EDUCATION/TRAINING PROGRAM

## 2019-01-01 PROCEDURE — 82140 ASSAY OF AMMONIA: CPT | Performed by: STUDENT IN AN ORGANIZED HEALTH CARE EDUCATION/TRAINING PROGRAM

## 2019-01-01 PROCEDURE — 85610 PROTHROMBIN TIME: CPT | Performed by: EMERGENCY MEDICINE

## 2019-01-01 PROCEDURE — 80053 COMPREHEN METABOLIC PANEL: CPT | Performed by: HOSPITALIST

## 2019-01-01 PROCEDURE — 87086 URINE CULTURE/COLONY COUNT: CPT | Performed by: INTERNAL MEDICINE

## 2019-01-01 PROCEDURE — 84132 ASSAY OF SERUM POTASSIUM: CPT | Performed by: INTERNAL MEDICINE

## 2019-01-01 PROCEDURE — 25000128 H RX IP 250 OP 636: Performed by: EMERGENCY MEDICINE

## 2019-01-01 PROCEDURE — 82565 ASSAY OF CREATININE: CPT | Performed by: HOSPITALIST

## 2019-01-01 PROCEDURE — 80053 COMPREHEN METABOLIC PANEL: CPT | Performed by: EMERGENCY MEDICINE

## 2019-01-01 PROCEDURE — 84300 ASSAY OF URINE SODIUM: CPT | Performed by: HOSPITALIST

## 2019-01-01 PROCEDURE — 93005 ELECTROCARDIOGRAM TRACING: CPT | Mod: 59

## 2019-01-01 PROCEDURE — 84295 ASSAY OF SERUM SODIUM: CPT | Performed by: HOSPITALIST

## 2019-01-01 PROCEDURE — 40000007 ZZH STATISTIC ADULT CD FACE TO FACE-NO CHRG

## 2019-01-01 PROCEDURE — 82272 OCCULT BLD FECES 1-3 TESTS: CPT | Performed by: HOSPITALIST

## 2019-01-01 PROCEDURE — 89051 BODY FLUID CELL COUNT: CPT | Performed by: PHYSICIAN ASSISTANT

## 2019-01-01 PROCEDURE — 80053 COMPREHEN METABOLIC PANEL: CPT | Performed by: INTERNAL MEDICINE

## 2019-01-01 PROCEDURE — 99291 CRITICAL CARE FIRST HOUR: CPT | Mod: 25

## 2019-01-01 PROCEDURE — 83735 ASSAY OF MAGNESIUM: CPT | Performed by: EMERGENCY MEDICINE

## 2019-01-01 PROCEDURE — 80048 BASIC METABOLIC PNL TOTAL CA: CPT | Performed by: HOSPITALIST

## 2019-01-01 PROCEDURE — 86901 BLOOD TYPING SEROLOGIC RH(D): CPT | Performed by: INTERNAL MEDICINE

## 2019-01-01 PROCEDURE — 85025 COMPLETE CBC W/AUTO DIFF WBC: CPT | Performed by: STUDENT IN AN ORGANIZED HEALTH CARE EDUCATION/TRAINING PROGRAM

## 2019-01-01 PROCEDURE — 85014 HEMATOCRIT: CPT

## 2019-01-01 PROCEDURE — P9016 RBC LEUKOCYTES REDUCED: HCPCS | Performed by: INTERNAL MEDICINE

## 2019-01-01 PROCEDURE — 87040 BLOOD CULTURE FOR BACTERIA: CPT | Mod: XS | Performed by: EMERGENCY MEDICINE

## 2019-01-01 PROCEDURE — 99232 SBSQ HOSP IP/OBS MODERATE 35: CPT | Performed by: PHYSICIAN ASSISTANT

## 2019-01-01 PROCEDURE — 71046 X-RAY EXAM CHEST 2 VIEWS: CPT

## 2019-01-01 PROCEDURE — 99207 ZZC CDG-MDM COMPONENT: MEETS LOW - DOWN CODED: CPT | Performed by: PHYSICIAN ASSISTANT

## 2019-01-01 PROCEDURE — 76700 US EXAM ABDOM COMPLETE: CPT

## 2019-01-01 PROCEDURE — 82140 ASSAY OF AMMONIA: CPT | Performed by: INTERNAL MEDICINE

## 2019-01-01 PROCEDURE — 85027 COMPLETE CBC AUTOMATED: CPT | Performed by: NURSE PRACTITIONER

## 2019-01-01 PROCEDURE — 73000 X-RAY EXAM OF COLLAR BONE: CPT | Mod: LT

## 2019-01-01 PROCEDURE — 96375 TX/PRO/DX INJ NEW DRUG ADDON: CPT | Mod: 59

## 2019-01-01 PROCEDURE — 97530 THERAPEUTIC ACTIVITIES: CPT | Mod: GO | Performed by: OCCUPATIONAL THERAPIST

## 2019-01-01 PROCEDURE — 97166 OT EVAL MOD COMPLEX 45 MIN: CPT | Mod: GO | Performed by: OCCUPATIONAL THERAPIST

## 2019-01-01 PROCEDURE — 87493 C DIFF AMPLIFIED PROBE: CPT | Performed by: INTERNAL MEDICINE

## 2019-01-01 PROCEDURE — 80047 BASIC METABLC PNL IONIZED CA: CPT

## 2019-01-01 PROCEDURE — 85027 COMPLETE CBC AUTOMATED: CPT | Performed by: FAMILY MEDICINE

## 2019-01-01 PROCEDURE — 25000125 ZZHC RX 250: Performed by: HOSPITALIST

## 2019-01-01 PROCEDURE — 25000128 H RX IP 250 OP 636: Performed by: HOSPITALIST

## 2019-01-01 PROCEDURE — 25000132 ZZH RX MED GY IP 250 OP 250 PS 637: Performed by: PSYCHIATRY & NEUROLOGY

## 2019-01-01 PROCEDURE — 87086 URINE CULTURE/COLONY COUNT: CPT | Performed by: EMERGENCY MEDICINE

## 2019-01-01 PROCEDURE — 36415 COLL VENOUS BLD VENIPUNCTURE: CPT | Performed by: HOSPITALIST

## 2019-01-01 PROCEDURE — 83735 ASSAY OF MAGNESIUM: CPT | Performed by: PHYSICIAN ASSISTANT

## 2019-01-01 PROCEDURE — 76770 US EXAM ABDO BACK WALL COMP: CPT

## 2019-01-01 PROCEDURE — 21000001 ZZH R&B HEART CARE

## 2019-01-01 PROCEDURE — 87088 URINE BACTERIA CULTURE: CPT | Performed by: INTERNAL MEDICINE

## 2019-01-01 PROCEDURE — 99221 1ST HOSP IP/OBS SF/LOW 40: CPT | Performed by: PSYCHIATRY & NEUROLOGY

## 2019-01-01 PROCEDURE — 99207 ZZC CDG-CHARGE REQUIRED MANUAL ENTRY: CPT | Performed by: INTERNAL MEDICINE

## 2019-01-01 PROCEDURE — 97166 OT EVAL MOD COMPLEX 45 MIN: CPT | Mod: GO | Performed by: REHABILITATION PRACTITIONER

## 2019-01-01 PROCEDURE — 83605 ASSAY OF LACTIC ACID: CPT | Performed by: HOSPITALIST

## 2019-01-01 PROCEDURE — 0W9G3ZZ DRAINAGE OF PERITONEAL CAVITY, PERCUTANEOUS APPROACH: ICD-10-PCS | Performed by: RADIOLOGY

## 2019-01-01 PROCEDURE — 96368 THER/DIAG CONCURRENT INF: CPT

## 2019-01-01 PROCEDURE — 84443 ASSAY THYROID STIM HORMONE: CPT | Performed by: FAMILY MEDICINE

## 2019-01-01 PROCEDURE — P9047 ALBUMIN (HUMAN), 25%, 50ML: HCPCS | Performed by: PHYSICIAN ASSISTANT

## 2019-01-01 PROCEDURE — 99223 1ST HOSP IP/OBS HIGH 75: CPT | Mod: AI | Performed by: HOSPITALIST

## 2019-01-01 PROCEDURE — 97535 SELF CARE MNGMENT TRAINING: CPT | Mod: GO | Performed by: OCCUPATIONAL THERAPIST

## 2019-01-01 PROCEDURE — 84295 ASSAY OF SERUM SODIUM: CPT | Performed by: INTERNAL MEDICINE

## 2019-01-01 PROCEDURE — G0463 HOSPITAL OUTPT CLINIC VISIT: HCPCS

## 2019-01-01 PROCEDURE — 49082 ABD PARACENTESIS: CPT

## 2019-01-01 PROCEDURE — 80048 BASIC METABOLIC PNL TOTAL CA: CPT | Performed by: FAMILY MEDICINE

## 2019-01-01 PROCEDURE — 25800030 ZZH RX IP 258 OP 636: Performed by: STUDENT IN AN ORGANIZED HEALTH CARE EDUCATION/TRAINING PROGRAM

## 2019-01-01 PROCEDURE — 72170 X-RAY EXAM OF PELVIS: CPT

## 2019-01-01 PROCEDURE — 74176 CT ABD & PELVIS W/O CONTRAST: CPT

## 2019-01-01 PROCEDURE — 96366 THER/PROPH/DIAG IV INF ADDON: CPT | Mod: 59

## 2019-01-01 PROCEDURE — 80053 COMPREHEN METABOLIC PANEL: CPT | Performed by: STUDENT IN AN ORGANIZED HEALTH CARE EDUCATION/TRAINING PROGRAM

## 2019-01-01 PROCEDURE — 36415 COLL VENOUS BLD VENIPUNCTURE: CPT | Performed by: NURSE PRACTITIONER

## 2019-01-01 PROCEDURE — 99222 1ST HOSP IP/OBS MODERATE 55: CPT | Performed by: NURSE PRACTITIONER

## 2019-01-01 PROCEDURE — 96361 HYDRATE IV INFUSION ADD-ON: CPT

## 2019-01-01 PROCEDURE — C9113 INJ PANTOPRAZOLE SODIUM, VIA: HCPCS | Performed by: INTERNAL MEDICINE

## 2019-01-01 PROCEDURE — 97535 SELF CARE MNGMENT TRAINING: CPT | Mod: GO | Performed by: OCCUPATIONAL THERAPY ASSISTANT

## 2019-01-01 PROCEDURE — 80320 DRUG SCREEN QUANTALCOHOLS: CPT | Performed by: EMERGENCY MEDICINE

## 2019-01-01 PROCEDURE — 85610 PROTHROMBIN TIME: CPT | Performed by: HOSPITALIST

## 2019-01-01 PROCEDURE — 70450 CT HEAD/BRAIN W/O DYE: CPT

## 2019-01-01 PROCEDURE — 82565 ASSAY OF CREATININE: CPT | Performed by: INTERNAL MEDICINE

## 2019-01-01 PROCEDURE — 96365 THER/PROPH/DIAG IV INF INIT: CPT | Mod: 59

## 2019-01-01 PROCEDURE — 86900 BLOOD TYPING SEROLOGIC ABO: CPT | Performed by: HOSPITALIST

## 2019-01-01 PROCEDURE — 87015 SPECIMEN INFECT AGNT CONCNTJ: CPT | Performed by: PHYSICIAN ASSISTANT

## 2019-01-01 PROCEDURE — 25000128 H RX IP 250 OP 636: Performed by: PHYSICIAN ASSISTANT

## 2019-01-01 PROCEDURE — 99207 ZZC NON-BILLABLE SERV PER CHARTING: CPT | Performed by: PSYCHOLOGIST

## 2019-01-01 PROCEDURE — 96375 TX/PRO/DX INJ NEW DRUG ADDON: CPT

## 2019-01-01 PROCEDURE — 25000128 H RX IP 250 OP 636

## 2019-01-01 PROCEDURE — 93005 ELECTROCARDIOGRAM TRACING: CPT

## 2019-01-01 PROCEDURE — 82042 OTHER SOURCE ALBUMIN QUAN EA: CPT | Performed by: EMERGENCY MEDICINE

## 2019-01-01 PROCEDURE — 90682 RIV4 VACC RECOMBINANT DNA IM: CPT | Performed by: INTERNAL MEDICINE

## 2019-01-01 PROCEDURE — 82803 BLOOD GASES ANY COMBINATION: CPT

## 2019-01-01 PROCEDURE — 99207 ZZC NON-BILLABLE SERV PER CHARTING: CPT | Performed by: INTERNAL MEDICINE

## 2019-01-01 PROCEDURE — 40000986 XR ABDOMEN 1 VW

## 2019-01-01 PROCEDURE — 72125 CT NECK SPINE W/O DYE: CPT

## 2019-01-01 PROCEDURE — 83735 ASSAY OF MAGNESIUM: CPT | Performed by: FAMILY MEDICINE

## 2019-01-01 RX ORDER — VILAZODONE HYDROCHLORIDE 40 MG/1
TABLET ORAL
Qty: 90 TABLET | Refills: 0 | Status: SHIPPED | OUTPATIENT
Start: 2019-01-01 | End: 2019-01-01

## 2019-01-01 RX ORDER — FUROSEMIDE 10 MG/ML
40 INJECTION INTRAMUSCULAR; INTRAVENOUS ONCE
Status: DISCONTINUED | OUTPATIENT
Start: 2019-01-01 | End: 2019-01-01

## 2019-01-01 RX ORDER — DEXTROSE MONOHYDRATE 25 G/50ML
25-50 INJECTION, SOLUTION INTRAVENOUS
Status: DISCONTINUED | OUTPATIENT
Start: 2019-01-01 | End: 2019-01-01 | Stop reason: HOSPADM

## 2019-01-01 RX ORDER — AMOXICILLIN 250 MG
1 CAPSULE ORAL 2 TIMES DAILY PRN
Status: DISCONTINUED | OUTPATIENT
Start: 2019-01-01 | End: 2019-01-01

## 2019-01-01 RX ORDER — NICOTINE POLACRILEX 4 MG
15-30 LOZENGE BUCCAL
Status: DISCONTINUED | OUTPATIENT
Start: 2019-01-01 | End: 2019-01-01 | Stop reason: HOSPADM

## 2019-01-01 RX ORDER — TRAZODONE HYDROCHLORIDE 50 MG/1
50 TABLET, FILM COATED ORAL AT BEDTIME
Qty: 45 TABLET | Refills: 3 | Status: ON HOLD | OUTPATIENT
Start: 2019-01-01 | End: 2019-01-01

## 2019-01-01 RX ORDER — LACTULOSE 10 G/15ML
20 SOLUTION ORAL 3 TIMES DAILY
Status: DISCONTINUED | OUTPATIENT
Start: 2019-01-01 | End: 2019-01-01

## 2019-01-01 RX ORDER — ERTAPENEM 1 G/1
1 INJECTION, POWDER, LYOPHILIZED, FOR SOLUTION INTRAMUSCULAR; INTRAVENOUS EVERY 24 HOURS
Status: DISCONTINUED | OUTPATIENT
Start: 2019-01-01 | End: 2019-01-01 | Stop reason: HOSPADM

## 2019-01-01 RX ORDER — NALOXONE HYDROCHLORIDE 0.4 MG/ML
.1-.4 INJECTION, SOLUTION INTRAMUSCULAR; INTRAVENOUS; SUBCUTANEOUS
Status: DISCONTINUED | OUTPATIENT
Start: 2019-01-01 | End: 2019-01-01 | Stop reason: HOSPADM

## 2019-01-01 RX ORDER — LANOLIN ALCOHOL/MO/W.PET/CERES
3 CREAM (GRAM) TOPICAL
Status: DISCONTINUED | OUTPATIENT
Start: 2019-01-01 | End: 2019-01-01 | Stop reason: HOSPADM

## 2019-01-01 RX ORDER — LIDOCAINE 40 MG/G
CREAM TOPICAL
Status: DISCONTINUED | OUTPATIENT
Start: 2019-01-01 | End: 2019-01-01 | Stop reason: HOSPADM

## 2019-01-01 RX ORDER — LANOLIN ALCOHOL/MO/W.PET/CERES
100 CREAM (GRAM) TOPICAL DAILY
Status: DISCONTINUED | OUTPATIENT
Start: 2019-01-01 | End: 2019-01-01 | Stop reason: CLARIF

## 2019-01-01 RX ORDER — ALPRAZOLAM 0.5 MG
0.5 TABLET ORAL 2 TIMES DAILY PRN
Qty: 60 TABLET | Refills: 2 | Status: ON HOLD | OUTPATIENT
Start: 2019-01-01 | End: 2019-01-01

## 2019-01-01 RX ORDER — ALBUMIN (HUMAN) 12.5 G/50ML
25 SOLUTION INTRAVENOUS EVERY 8 HOURS
Status: COMPLETED | OUTPATIENT
Start: 2019-01-01 | End: 2019-01-01

## 2019-01-01 RX ORDER — POLYETHYLENE GLYCOL 3350 17 G/17G
17 POWDER, FOR SOLUTION ORAL DAILY PRN
Status: DISCONTINUED | OUTPATIENT
Start: 2019-01-01 | End: 2019-01-01 | Stop reason: HOSPADM

## 2019-01-01 RX ORDER — HYDROMORPHONE HYDROCHLORIDE 10 MG/ML
1-2 INJECTION INTRAMUSCULAR; INTRAVENOUS; SUBCUTANEOUS
Qty: 30 ML | Refills: 0 | Status: SHIPPED | OUTPATIENT
Start: 2019-01-01

## 2019-01-01 RX ORDER — POTASSIUM CHLORIDE 1.5 G/1.58G
20 POWDER, FOR SOLUTION ORAL EVERY 4 HOURS
Status: DISCONTINUED | OUTPATIENT
Start: 2019-01-01 | End: 2019-01-01

## 2019-01-01 RX ORDER — LIDOCAINE 4 G/G
1 PATCH TOPICAL
Status: DISCONTINUED | OUTPATIENT
Start: 2019-01-01 | End: 2019-01-01 | Stop reason: HOSPADM

## 2019-01-01 RX ORDER — LANOLIN ALCOHOL/MO/W.PET/CERES
3 CREAM (GRAM) TOPICAL
Qty: 30 TABLET | Refills: 3 | Status: ON HOLD | OUTPATIENT
Start: 2019-01-01 | End: 2019-01-01

## 2019-01-01 RX ORDER — ONDANSETRON 2 MG/ML
4 INJECTION INTRAMUSCULAR; INTRAVENOUS EVERY 6 HOURS PRN
Status: DISCONTINUED | OUTPATIENT
Start: 2019-01-01 | End: 2019-01-01 | Stop reason: HOSPADM

## 2019-01-01 RX ORDER — FUROSEMIDE 40 MG
40 TABLET ORAL
Status: DISCONTINUED | OUTPATIENT
Start: 2019-01-01 | End: 2019-01-01

## 2019-01-01 RX ORDER — CEFTRIAXONE 1 G/1
1 INJECTION, POWDER, FOR SOLUTION INTRAMUSCULAR; INTRAVENOUS EVERY 24 HOURS
Status: DISCONTINUED | OUTPATIENT
Start: 2019-01-01 | End: 2019-01-01

## 2019-01-01 RX ORDER — SPIRONOLACTONE 25 MG/1
25 TABLET ORAL DAILY
Qty: 60 TABLET | Refills: 3 | Status: ON HOLD | OUTPATIENT
Start: 2019-01-01 | End: 2019-01-01

## 2019-01-01 RX ORDER — ALBUMIN (HUMAN) 12.5 G/50ML
12.5 SOLUTION INTRAVENOUS ONCE
Status: COMPLETED | OUTPATIENT
Start: 2019-01-01 | End: 2019-01-01

## 2019-01-01 RX ORDER — ROSUVASTATIN CALCIUM 40 MG/1
TABLET, COATED ORAL
Start: 2019-01-01

## 2019-01-01 RX ORDER — SPIRONOLACTONE 25 MG/1
25 TABLET ORAL
Status: DISCONTINUED | OUTPATIENT
Start: 2019-01-01 | End: 2019-01-01

## 2019-01-01 RX ORDER — MULTIVITAMIN,THERAPEUTIC
1 TABLET ORAL DAILY
Status: DISCONTINUED | OUTPATIENT
Start: 2019-01-01 | End: 2019-01-01 | Stop reason: HOSPADM

## 2019-01-01 RX ORDER — FUROSEMIDE 10 MG/ML
20 INJECTION INTRAMUSCULAR; INTRAVENOUS ONCE
Status: COMPLETED | OUTPATIENT
Start: 2019-01-01 | End: 2019-01-01

## 2019-01-01 RX ORDER — LANOLIN ALCOHOL/MO/W.PET/CERES
1000 CREAM (GRAM) TOPICAL DAILY
Status: DISCONTINUED | OUTPATIENT
Start: 2019-01-01 | End: 2019-01-01

## 2019-01-01 RX ORDER — SPIRONOLACTONE 25 MG/1
25 TABLET ORAL ONCE
Status: COMPLETED | OUTPATIENT
Start: 2019-01-01 | End: 2019-01-01

## 2019-01-01 RX ORDER — SODIUM CHLORIDE 9 MG/ML
INJECTION, SOLUTION INTRAVENOUS CONTINUOUS
Status: DISCONTINUED | OUTPATIENT
Start: 2019-01-01 | End: 2019-01-01

## 2019-01-01 RX ORDER — LACTULOSE 10 G/15ML
20 SOLUTION ORAL 2 TIMES DAILY
Status: DISCONTINUED | OUTPATIENT
Start: 2019-01-01 | End: 2019-01-01 | Stop reason: HOSPADM

## 2019-01-01 RX ORDER — LORAZEPAM 0.5 MG/1
.5-1 TABLET ORAL EVERY 6 HOURS PRN
Qty: 18 TABLET | Refills: 0 | Status: SHIPPED | OUTPATIENT
Start: 2019-01-01

## 2019-01-01 RX ORDER — SPIRONOLACTONE 25 MG/1
25 TABLET ORAL
Refills: 0 | Status: ON HOLD
Start: 2019-01-01 | End: 2019-01-01

## 2019-01-01 RX ORDER — ALPRAZOLAM 0.5 MG
0.5 TABLET ORAL 2 TIMES DAILY PRN
Qty: 60 TABLET | Refills: 0 | Status: SHIPPED | OUTPATIENT
Start: 2019-01-01 | End: 2019-01-01

## 2019-01-01 RX ORDER — ALPRAZOLAM 0.5 MG
TABLET ORAL
Start: 2019-01-01

## 2019-01-01 RX ORDER — TRAZODONE HYDROCHLORIDE 50 MG/1
100 TABLET, FILM COATED ORAL AT BEDTIME
Start: 2019-01-01 | End: 2019-01-01

## 2019-01-01 RX ORDER — LANOLIN ALCOHOL/MO/W.PET/CERES
100 CREAM (GRAM) TOPICAL DAILY
Qty: 7 TABLET | Refills: 0 | Status: ON HOLD | OUTPATIENT
Start: 2019-01-01 | End: 2019-01-01

## 2019-01-01 RX ORDER — FOLIC ACID 1 MG/1
1 TABLET ORAL DAILY
Qty: 20 TABLET | Refills: 0 | Status: SHIPPED | OUTPATIENT
Start: 2019-01-01 | End: 2019-01-01

## 2019-01-01 RX ORDER — LORAZEPAM 2 MG/ML
0.5 INJECTION INTRAMUSCULAR ONCE
Status: COMPLETED | OUTPATIENT
Start: 2019-01-01 | End: 2019-01-01

## 2019-01-01 RX ORDER — POTASSIUM CHLORIDE 1.5 G/1.58G
20 POWDER, FOR SOLUTION ORAL EVERY 4 HOURS
Status: COMPLETED | OUTPATIENT
Start: 2019-01-01 | End: 2019-01-01

## 2019-01-01 RX ORDER — VALACYCLOVIR HYDROCHLORIDE 500 MG/1
TABLET, FILM COATED ORAL
Qty: 8 TABLET | Refills: 2 | Status: ON HOLD | OUTPATIENT
Start: 2019-01-01 | End: 2019-01-01

## 2019-01-01 RX ORDER — SPIRONOLACTONE 25 MG/1
50 TABLET ORAL DAILY
Status: DISCONTINUED | OUTPATIENT
Start: 2019-01-01 | End: 2019-01-01 | Stop reason: HOSPADM

## 2019-01-01 RX ORDER — LACTULOSE 10 G/15ML
20 SOLUTION ORAL 2 TIMES DAILY
Qty: 500 ML | Refills: 3 | Status: ON HOLD | OUTPATIENT
Start: 2019-01-01 | End: 2019-01-01

## 2019-01-01 RX ORDER — ACETAMINOPHEN 650 MG/1
650 SUPPOSITORY RECTAL EVERY 4 HOURS PRN
Qty: 6 SUPPOSITORY | Refills: 0 | Status: SHIPPED | OUTPATIENT
Start: 2019-01-01

## 2019-01-01 RX ORDER — LORAZEPAM 2 MG/ML
INJECTION INTRAMUSCULAR
Status: COMPLETED
Start: 2019-01-01 | End: 2019-01-01

## 2019-01-01 RX ORDER — ROSUVASTATIN CALCIUM 20 MG/1
20 TABLET, COATED ORAL DAILY
Status: ON HOLD | COMMUNITY
End: 2019-01-01

## 2019-01-01 RX ORDER — TRAZODONE HYDROCHLORIDE 50 MG/1
50 TABLET, FILM COATED ORAL AT BEDTIME
Status: DISCONTINUED | OUTPATIENT
Start: 2019-01-01 | End: 2019-01-01

## 2019-01-01 RX ORDER — LACTULOSE 10 G/15ML
10 SOLUTION ORAL DAILY
Qty: 500 ML | Refills: 3 | Status: ON HOLD | COMMUNITY
Start: 2019-01-01 | End: 2019-01-01

## 2019-01-01 RX ORDER — THIAMINE HYDROCHLORIDE 100 MG/ML
100 INJECTION, SOLUTION INTRAMUSCULAR; INTRAVENOUS DAILY
Status: DISCONTINUED | OUTPATIENT
Start: 2019-01-01 | End: 2019-01-01

## 2019-01-01 RX ORDER — LIDOCAINE 40 MG/G
CREAM TOPICAL
Status: CANCELLED | OUTPATIENT
Start: 2019-01-01

## 2019-01-01 RX ORDER — LACTULOSE 10 G/15ML
20 SOLUTION ORAL DAILY
Status: DISCONTINUED | OUTPATIENT
Start: 2019-01-01 | End: 2019-01-01 | Stop reason: HOSPADM

## 2019-01-01 RX ORDER — LACTULOSE 10 G/10G
20 SOLUTION ORAL 2 TIMES DAILY
Qty: 60 PACKET | Refills: 1 | Status: ON HOLD | OUTPATIENT
Start: 2019-01-01 | End: 2019-01-01

## 2019-01-01 RX ORDER — LACTULOSE 10 G/15ML
20 SOLUTION ORAL 3 TIMES DAILY
Start: 2019-01-01 | End: 2019-01-01

## 2019-01-01 RX ORDER — DIPHENHYDRAMINE HYDROCHLORIDE AND LIDOCAINE HYDROCHLORIDE AND ALUMINUM HYDROXIDE AND MAGNESIUM HYDRO
10 KIT EVERY 6 HOURS PRN
Status: DISCONTINUED | OUTPATIENT
Start: 2019-01-01 | End: 2019-01-01 | Stop reason: HOSPADM

## 2019-01-01 RX ORDER — VILAZODONE HYDROCHLORIDE 40 MG/1
40 TABLET ORAL DAILY
Qty: 90 TABLET | Refills: 0 | Status: ON HOLD | OUTPATIENT
Start: 2019-01-01 | End: 2019-01-01

## 2019-01-01 RX ORDER — POTASSIUM CHLORIDE 1.5 G/1.58G
40 POWDER, FOR SOLUTION ORAL
Status: COMPLETED | OUTPATIENT
Start: 2019-01-01 | End: 2019-01-01

## 2019-01-01 RX ORDER — TRAZODONE HYDROCHLORIDE 50 MG/1
50 TABLET, FILM COATED ORAL AT BEDTIME
Status: DISCONTINUED | OUTPATIENT
Start: 2019-01-01 | End: 2019-01-01 | Stop reason: HOSPADM

## 2019-01-01 RX ORDER — ONDANSETRON 4 MG/1
4 TABLET, ORALLY DISINTEGRATING ORAL EVERY 6 HOURS PRN
Status: DISCONTINUED | OUTPATIENT
Start: 2019-01-01 | End: 2019-01-01 | Stop reason: HOSPADM

## 2019-01-01 RX ORDER — THIAMINE HYDROCHLORIDE 100 MG/ML
100 INJECTION, SOLUTION INTRAMUSCULAR; INTRAVENOUS ONCE
Status: COMPLETED | OUTPATIENT
Start: 2019-01-01 | End: 2019-01-01

## 2019-01-01 RX ORDER — DEXTROSE MONOHYDRATE 25 G/50ML
25-50 INJECTION, SOLUTION INTRAVENOUS
Status: DISCONTINUED | OUTPATIENT
Start: 2019-01-01 | End: 2019-01-01

## 2019-01-01 RX ORDER — CEFTRIAXONE 1 G/1
1 INJECTION, POWDER, FOR SOLUTION INTRAMUSCULAR; INTRAVENOUS ONCE
Status: COMPLETED | OUTPATIENT
Start: 2019-01-01 | End: 2019-01-01

## 2019-01-01 RX ORDER — GABAPENTIN 100 MG/1
100 CAPSULE ORAL 2 TIMES DAILY
Qty: 60 CAPSULE | Refills: 0 | Status: ON HOLD | OUTPATIENT
Start: 2019-01-01 | End: 2019-01-01

## 2019-01-01 RX ORDER — VILAZODONE HYDROCHLORIDE 20 MG/1
40 TABLET ORAL DAILY
Status: DISCONTINUED | OUTPATIENT
Start: 2019-01-01 | End: 2019-01-01 | Stop reason: HOSPADM

## 2019-01-01 RX ORDER — LIDOCAINE HYDROCHLORIDE 10 MG/ML
10 INJECTION, SOLUTION EPIDURAL; INFILTRATION; INTRACAUDAL; PERINEURAL ONCE
Status: COMPLETED | OUTPATIENT
Start: 2019-01-01 | End: 2019-01-01

## 2019-01-01 RX ORDER — ALBUMIN (HUMAN) 12.5 G/50ML
25 SOLUTION INTRAVENOUS ONCE
Status: COMPLETED | OUTPATIENT
Start: 2019-01-01 | End: 2019-01-01

## 2019-01-01 RX ORDER — SENNOSIDES 8.6 MG
1 TABLET ORAL 2 TIMES DAILY
Qty: 8 TABLET | Refills: 0 | Status: SHIPPED | OUTPATIENT
Start: 2019-01-01

## 2019-01-01 RX ORDER — CHOLECALCIFEROL (VITAMIN D3) 50 MCG
1 TABLET ORAL DAILY
Qty: 30 TABLET | Refills: 3 | Status: ON HOLD | OUTPATIENT
Start: 2019-01-01 | End: 2019-01-01

## 2019-01-01 RX ORDER — POTASSIUM CL/LIDO/0.9 % NACL 10MEQ/0.1L
10 INTRAVENOUS SOLUTION, PIGGYBACK (ML) INTRAVENOUS ONCE
Status: COMPLETED | OUTPATIENT
Start: 2019-01-01 | End: 2019-01-01

## 2019-01-01 RX ORDER — LANOLIN ALCOHOL/MO/W.PET/CERES
1000 CREAM (GRAM) TOPICAL DAILY
Status: DISCONTINUED | OUTPATIENT
Start: 2019-01-01 | End: 2019-01-01 | Stop reason: HOSPADM

## 2019-01-01 RX ORDER — SPIRONOLACTONE 25 MG/1
25 TABLET ORAL
Status: DISCONTINUED | OUTPATIENT
Start: 2019-01-01 | End: 2019-01-01 | Stop reason: HOSPADM

## 2019-01-01 RX ORDER — POTASSIUM CHLORIDE 1.5 G/1.58G
20 POWDER, FOR SOLUTION ORAL ONCE
Status: COMPLETED | OUTPATIENT
Start: 2019-01-01 | End: 2019-01-01

## 2019-01-01 RX ORDER — LANOLIN ALCOHOL/MO/W.PET/CERES
100 CREAM (GRAM) TOPICAL DAILY
Qty: 7 TABLET | Refills: 0 | Status: ON HOLD
Start: 2019-01-01 | End: 2019-01-01

## 2019-01-01 RX ORDER — HALOPERIDOL 1 MG/1
1-2 TABLET ORAL 4 TIMES DAILY PRN
Qty: 12 TABLET | Refills: 0 | Status: SHIPPED | OUTPATIENT
Start: 2019-01-01

## 2019-01-01 RX ORDER — LIDOCAINE 40 MG/G
CREAM TOPICAL
Status: DISCONTINUED | OUTPATIENT
Start: 2019-01-01 | End: 2019-01-01

## 2019-01-01 RX ORDER — FOLIC ACID 1 MG/1
1 TABLET ORAL DAILY
Status: DISCONTINUED | OUTPATIENT
Start: 2019-01-01 | End: 2019-01-01

## 2019-01-01 RX ORDER — MAGNESIUM SULFATE HEPTAHYDRATE 40 MG/ML
4 INJECTION, SOLUTION INTRAVENOUS ONCE
Status: COMPLETED | OUTPATIENT
Start: 2019-01-01 | End: 2019-01-01

## 2019-01-01 RX ORDER — ANALGESIC BALM 1.74; 4.06 G/29G; G/29G
OINTMENT TOPICAL EVERY 6 HOURS PRN
Status: DISCONTINUED | OUTPATIENT
Start: 2019-01-01 | End: 2019-01-01

## 2019-01-01 RX ORDER — HALOPERIDOL 1 MG/1
1-2 TABLET ORAL 4 TIMES DAILY
Qty: 12 TABLET | Refills: 0 | Status: SHIPPED | OUTPATIENT
Start: 2019-01-01 | End: 2019-01-01

## 2019-01-01 RX ORDER — LANOLIN ALCOHOL/MO/W.PET/CERES
100 CREAM (GRAM) TOPICAL DAILY
Status: DISCONTINUED | OUTPATIENT
Start: 2019-01-01 | End: 2019-01-01 | Stop reason: HOSPADM

## 2019-01-01 RX ORDER — CHOLECALCIFEROL (VITAMIN D3) 50 MCG
1 TABLET ORAL DAILY
COMMUNITY
End: 2019-01-01

## 2019-01-01 RX ORDER — VITAMIN B COMPLEX
2000 TABLET ORAL DAILY
Status: DISCONTINUED | OUTPATIENT
Start: 2019-01-01 | End: 2019-01-01 | Stop reason: HOSPADM

## 2019-01-01 RX ORDER — NICOTINE POLACRILEX 4 MG
15-30 LOZENGE BUCCAL
Status: DISCONTINUED | OUTPATIENT
Start: 2019-01-01 | End: 2019-01-01

## 2019-01-01 RX ORDER — FOLIC ACID 1 MG/1
1 TABLET ORAL DAILY
Qty: 20 TABLET | Refills: 0 | Status: ON HOLD | OUTPATIENT
Start: 2019-01-01 | End: 2019-01-01

## 2019-01-01 RX ORDER — ATROPINE SULFATE 10 MG/ML
2-4 SOLUTION/ DROPS OPHTHALMIC
Qty: 5 ML | Refills: 0 | Status: SHIPPED | OUTPATIENT
Start: 2019-01-01

## 2019-01-01 RX ORDER — TRAMADOL HYDROCHLORIDE 50 MG/1
50 TABLET ORAL ONCE
Status: COMPLETED | OUTPATIENT
Start: 2019-01-01 | End: 2019-01-01

## 2019-01-01 RX ORDER — FOLIC ACID 1 MG/1
1 TABLET ORAL DAILY
Status: DISCONTINUED | OUTPATIENT
Start: 2019-01-01 | End: 2019-01-01 | Stop reason: HOSPADM

## 2019-01-01 RX ORDER — TRAZODONE HYDROCHLORIDE 50 MG/1
75 TABLET, FILM COATED ORAL AT BEDTIME
Status: ON HOLD | COMMUNITY
End: 2019-01-01

## 2019-01-01 RX ORDER — LIDOCAINE 4 G/G
1 PATCH TOPICAL EVERY 24 HOURS
Qty: 12 PATCH | Refills: 0 | Status: ON HOLD | OUTPATIENT
Start: 2019-01-01 | End: 2019-01-01

## 2019-01-01 RX ORDER — MIDODRINE HYDROCHLORIDE 5 MG/1
10 TABLET ORAL
Status: DISCONTINUED | OUTPATIENT
Start: 2019-01-01 | End: 2019-01-01 | Stop reason: HOSPADM

## 2019-01-01 RX ORDER — LIDOCAINE HYDROCHLORIDE 10 MG/ML
INJECTION, SOLUTION INFILTRATION; PERINEURAL
Status: DISCONTINUED
Start: 2019-01-01 | End: 2019-01-01 | Stop reason: HOSPADM

## 2019-01-01 RX ORDER — NITROGLYCERIN 0.4 MG/1
0.4 TABLET SUBLINGUAL EVERY 5 MIN PRN
Status: DISCONTINUED | OUTPATIENT
Start: 2019-01-01 | End: 2019-01-01

## 2019-01-01 RX ORDER — DEXTROSE MONOHYDRATE, SODIUM CHLORIDE, AND POTASSIUM CHLORIDE 50; 1.49; 4.5 G/1000ML; G/1000ML; G/1000ML
INJECTION, SOLUTION INTRAVENOUS CONTINUOUS
Status: DISCONTINUED | OUTPATIENT
Start: 2019-01-01 | End: 2019-01-01

## 2019-01-01 RX ORDER — AMOXICILLIN 250 MG
2 CAPSULE ORAL 2 TIMES DAILY PRN
Status: DISCONTINUED | OUTPATIENT
Start: 2019-01-01 | End: 2019-01-01

## 2019-01-01 RX ORDER — GABAPENTIN 100 MG/1
100 CAPSULE ORAL 2 TIMES DAILY
Status: DISCONTINUED | OUTPATIENT
Start: 2019-01-01 | End: 2019-01-01

## 2019-01-01 RX ORDER — ALPRAZOLAM 0.5 MG
TABLET ORAL
Qty: 60 TABLET | Refills: 0 | OUTPATIENT
Start: 2019-01-01

## 2019-01-01 RX ORDER — BISACODYL 10 MG
10 SUPPOSITORY, RECTAL RECTAL DAILY PRN
Qty: 4 SUPPOSITORY | Refills: 0 | Status: SHIPPED | OUTPATIENT
Start: 2019-01-01

## 2019-01-01 RX ORDER — HYDROXYZINE HYDROCHLORIDE 25 MG/1
25 TABLET, FILM COATED ORAL 3 TIMES DAILY PRN
Status: COMPLETED | OUTPATIENT
Start: 2019-01-01 | End: 2019-01-01

## 2019-01-01 RX ORDER — PANTOPRAZOLE SODIUM 40 MG/1
40 TABLET, DELAYED RELEASE ORAL
Status: DISCONTINUED | OUTPATIENT
Start: 2019-01-01 | End: 2019-01-01 | Stop reason: HOSPADM

## 2019-01-01 RX ORDER — GABAPENTIN 100 MG/1
100 CAPSULE ORAL 2 TIMES DAILY
Status: DISCONTINUED | OUTPATIENT
Start: 2019-01-01 | End: 2019-01-01 | Stop reason: HOSPADM

## 2019-01-01 RX ORDER — NYSTATIN 100000/ML
500000 SUSPENSION, ORAL (FINAL DOSE FORM) ORAL 4 TIMES DAILY
Status: DISCONTINUED | OUTPATIENT
Start: 2019-01-01 | End: 2019-01-01 | Stop reason: HOSPADM

## 2019-01-01 RX ORDER — LACTULOSE 10 G/15ML
20 SOLUTION ORAL ONCE
Status: COMPLETED | OUTPATIENT
Start: 2019-01-01 | End: 2019-01-01

## 2019-01-01 RX ORDER — POTASSIUM CHLORIDE 1500 MG/1
20 TABLET, EXTENDED RELEASE ORAL ONCE
Status: COMPLETED | OUTPATIENT
Start: 2019-01-01 | End: 2019-01-01

## 2019-01-01 RX ORDER — ACETAMINOPHEN 325 MG/1
650 TABLET ORAL EVERY 6 HOURS PRN
Status: DISCONTINUED | OUTPATIENT
Start: 2019-01-01 | End: 2019-01-01 | Stop reason: HOSPADM

## 2019-01-01 RX ORDER — FUROSEMIDE 20 MG
20 TABLET ORAL 2 TIMES DAILY
Qty: 14 TABLET | Refills: 0 | Status: ON HOLD | OUTPATIENT
Start: 2019-01-01 | End: 2019-01-01

## 2019-01-01 RX ORDER — MAGNESIUM OXIDE 400 MG/1
400 TABLET ORAL DAILY
Status: DISCONTINUED | OUTPATIENT
Start: 2019-01-01 | End: 2019-01-01 | Stop reason: HOSPADM

## 2019-01-01 RX ORDER — MULTIVITAMIN,THERAPEUTIC
1 TABLET ORAL DAILY
Status: ON HOLD
Start: 2019-01-01 | End: 2019-01-01

## 2019-01-01 RX ORDER — DEXTROSE MONOHYDRATE 25 G/50ML
25-50 INJECTION, SOLUTION INTRAVENOUS
Status: CANCELLED | OUTPATIENT
Start: 2019-01-01

## 2019-01-01 RX ORDER — POTASSIUM CHLORIDE 1500 MG/1
40 TABLET, EXTENDED RELEASE ORAL ONCE
Status: COMPLETED | OUTPATIENT
Start: 2019-01-01 | End: 2019-01-01

## 2019-01-01 RX ORDER — LIDOCAINE 4 G/G
1-2 PATCH TOPICAL
Status: DISCONTINUED | OUTPATIENT
Start: 2019-01-01 | End: 2019-01-01 | Stop reason: HOSPADM

## 2019-01-01 RX ORDER — CHLORAL HYDRATE 500 MG
2 CAPSULE ORAL DAILY
Status: ON HOLD | COMMUNITY
End: 2019-01-01

## 2019-01-01 RX ORDER — ONDANSETRON 4 MG/1
4 TABLET, ORALLY DISINTEGRATING ORAL EVERY 6 HOURS PRN
Status: ON HOLD
Start: 2019-01-01 | End: 2019-01-01

## 2019-01-01 RX ORDER — LACTULOSE 10 G/15ML
20 SOLUTION ORAL 2 TIMES DAILY
Status: DISCONTINUED | OUTPATIENT
Start: 2019-01-01 | End: 2019-01-01

## 2019-01-01 RX ORDER — SULFAMETHOXAZOLE/TRIMETHOPRIM 800-160 MG
1 TABLET ORAL 2 TIMES DAILY
Qty: 14 TABLET | Refills: 0 | Status: ON HOLD | OUTPATIENT
Start: 2019-01-01 | End: 2019-01-01

## 2019-01-01 RX ORDER — LACTULOSE 10 G/15ML
10 SOLUTION ORAL DAILY
Status: DISCONTINUED | OUTPATIENT
Start: 2019-01-01 | End: 2019-01-01 | Stop reason: HOSPADM

## 2019-01-01 RX ORDER — NICOTINE POLACRILEX 4 MG
15-30 LOZENGE BUCCAL
Status: CANCELLED | OUTPATIENT
Start: 2019-01-01

## 2019-01-01 RX ORDER — SPIRONOLACTONE 25 MG/1
50 TABLET ORAL
Status: DISCONTINUED | OUTPATIENT
Start: 2019-01-01 | End: 2019-01-01

## 2019-01-01 RX ORDER — GABAPENTIN 100 MG/1
100 CAPSULE ORAL DAILY
Status: DISCONTINUED | OUTPATIENT
Start: 2019-01-01 | End: 2019-01-01 | Stop reason: HOSPADM

## 2019-01-01 RX ADMIN — VILAZODONE HYDROCHLORIDE 40 MG: 20 TABLET ORAL at 08:59

## 2019-01-01 RX ADMIN — ERTAPENEM SODIUM 1 G: 1 INJECTION, POWDER, LYOPHILIZED, FOR SOLUTION INTRAMUSCULAR; INTRAVENOUS at 20:23

## 2019-01-01 RX ADMIN — DICLOFENAC SODIUM 2 G: 10 GEL TOPICAL at 22:49

## 2019-01-01 RX ADMIN — MELATONIN 2000 UNITS: at 15:34

## 2019-01-01 RX ADMIN — POTASSIUM CHLORIDE 40 MEQ: 1.5 POWDER, FOR SOLUTION ORAL at 10:21

## 2019-01-01 RX ADMIN — MAGNESIUM SULFATE HEPTAHYDRATE 4 G: 40 INJECTION, SOLUTION INTRAVENOUS at 16:36

## 2019-01-01 RX ADMIN — LACTULOSE 20 G: 10 SOLUTION ORAL at 08:49

## 2019-01-01 RX ADMIN — THERA TABS 1 TABLET: TAB at 08:02

## 2019-01-01 RX ADMIN — LACTULOSE 20 G: 20 SOLUTION ORAL at 21:17

## 2019-01-01 RX ADMIN — MELATONIN 2000 UNITS: at 08:07

## 2019-01-01 RX ADMIN — Medication 100 MG: at 08:42

## 2019-01-01 RX ADMIN — OMEPRAZOLE 20 MG: 20 CAPSULE, DELAYED RELEASE ORAL at 10:22

## 2019-01-01 RX ADMIN — POTASSIUM CHLORIDE 20 MEQ: 1.5 POWDER, FOR SOLUTION ORAL at 13:00

## 2019-01-01 RX ADMIN — RIFAXIMIN 550 MG: 550 TABLET ORAL at 08:42

## 2019-01-01 RX ADMIN — Medication 40 MG: at 15:37

## 2019-01-01 RX ADMIN — LACTULOSE 20 G: 20 SOLUTION ORAL at 10:17

## 2019-01-01 RX ADMIN — Medication 40 MG: at 06:32

## 2019-01-01 RX ADMIN — SPIRONOLACTONE 25 MG: 25 TABLET ORAL at 16:35

## 2019-01-01 RX ADMIN — MIDODRINE HYDROCHLORIDE 10 MG: 5 TABLET ORAL at 18:29

## 2019-01-01 RX ADMIN — THERA TABS 1 TABLET: TAB at 08:48

## 2019-01-01 RX ADMIN — DICLOFENAC SODIUM 4 G: 10 GEL TOPICAL at 08:27

## 2019-01-01 RX ADMIN — SODIUM PHOSPHATE, MONOBASIC, MONOHYDRATE 25 MMOL: 276; 142 INJECTION, SOLUTION INTRAVENOUS at 10:16

## 2019-01-01 RX ADMIN — NYSTATIN 500000 UNITS: 100000 SUSPENSION ORAL at 09:47

## 2019-01-01 RX ADMIN — RIFAXIMIN 550 MG: 550 TABLET ORAL at 21:08

## 2019-01-01 RX ADMIN — SPIRONOLACTONE 25 MG: 25 TABLET ORAL at 15:19

## 2019-01-01 RX ADMIN — Medication 100 MG: at 08:41

## 2019-01-01 RX ADMIN — TRAZODONE HYDROCHLORIDE 50 MG: 50 TABLET ORAL at 21:01

## 2019-01-01 RX ADMIN — HYDROXYZINE HYDROCHLORIDE 25 MG: 25 TABLET, FILM COATED ORAL at 22:20

## 2019-01-01 RX ADMIN — THERA TABS 1 TABLET: TAB at 07:58

## 2019-01-01 RX ADMIN — MIDODRINE HYDROCHLORIDE 10 MG: 5 TABLET ORAL at 16:55

## 2019-01-01 RX ADMIN — OYSTER SHELL CALCIUM WITH VITAMIN D 1 TABLET: 500; 200 TABLET, FILM COATED ORAL at 18:28

## 2019-01-01 RX ADMIN — LACTULOSE 20 G: 10 SOLUTION ORAL at 20:39

## 2019-01-01 RX ADMIN — RIFAXIMIN 550 MG: 550 TABLET ORAL at 08:55

## 2019-01-01 RX ADMIN — LACTULOSE 20 G: 10 SOLUTION ORAL at 08:24

## 2019-01-01 RX ADMIN — FOLIC ACID 1 MG: 1 TABLET ORAL at 08:38

## 2019-01-01 RX ADMIN — SODIUM CHLORIDE 500 ML: 9 INJECTION, SOLUTION INTRAVENOUS at 10:19

## 2019-01-01 RX ADMIN — LACTULOSE 20 G: 20 SOLUTION ORAL at 21:08

## 2019-01-01 RX ADMIN — VILAZODONE HYDROCHLORIDE 40 MG: 20 TABLET ORAL at 08:37

## 2019-01-01 RX ADMIN — FOLIC ACID 1 MG: 1 TABLET ORAL at 08:07

## 2019-01-01 RX ADMIN — LACTULOSE 20 G: 20 SOLUTION ORAL at 08:42

## 2019-01-01 RX ADMIN — LACTULOSE 20 G: 10 SOLUTION ORAL at 08:36

## 2019-01-01 RX ADMIN — Medication 100 MG: at 08:26

## 2019-01-01 RX ADMIN — LIDOCAINE HYDROCHLORIDE 10 ML: 10 INJECTION, SOLUTION EPIDURAL; INFILTRATION; INTRACAUDAL; PERINEURAL at 13:10

## 2019-01-01 RX ADMIN — Medication: at 16:55

## 2019-01-01 RX ADMIN — SODIUM CHLORIDE: 9 INJECTION, SOLUTION INTRAVENOUS at 01:45

## 2019-01-01 RX ADMIN — LACTULOSE 20 G: 20 SOLUTION ORAL at 21:40

## 2019-01-01 RX ADMIN — VILAZODONE HYDROCHLORIDE 40 MG: 20 TABLET ORAL at 16:38

## 2019-01-01 RX ADMIN — LACTULOSE 20 G: 10 SOLUTION ORAL at 21:10

## 2019-01-01 RX ADMIN — DICLOFENAC SODIUM 2 G: 10 GEL TOPICAL at 10:18

## 2019-01-01 RX ADMIN — SPIRONOLACTONE 50 MG: 25 TABLET ORAL at 16:32

## 2019-01-01 RX ADMIN — MICONAZOLE NITRATE: 20 POWDER TOPICAL at 08:56

## 2019-01-01 RX ADMIN — RIFAXIMIN 550 MG: 550 TABLET ORAL at 14:34

## 2019-01-01 RX ADMIN — TRAZODONE HYDROCHLORIDE 50 MG: 50 TABLET ORAL at 21:24

## 2019-01-01 RX ADMIN — RIFAXIMIN 550 MG: 550 TABLET ORAL at 09:17

## 2019-01-01 RX ADMIN — SPIRONOLACTONE 25 MG: 25 TABLET ORAL at 08:42

## 2019-01-01 RX ADMIN — RIFAXIMIN 550 MG: 550 TABLET ORAL at 07:58

## 2019-01-01 RX ADMIN — THERA TABS 1 TABLET: TAB at 09:17

## 2019-01-01 RX ADMIN — Medication 100 MG: at 08:59

## 2019-01-01 RX ADMIN — DICLOFENAC SODIUM 4 G: 10 GEL TOPICAL at 16:29

## 2019-01-01 RX ADMIN — LACTULOSE 20 G: 20 SOLUTION ORAL at 21:01

## 2019-01-01 RX ADMIN — OMEPRAZOLE 20 MG: 20 CAPSULE, DELAYED RELEASE ORAL at 08:07

## 2019-01-01 RX ADMIN — DIPHENHYDRAMINE HYDROCHLORIDE AND LIDOCAINE HYDROCHLORIDE AND ALUMINUM HYDROXIDE AND MAGNESIUM HYDRO 10 ML: KIT at 10:20

## 2019-01-01 RX ADMIN — Medication 5 MG: at 23:23

## 2019-01-01 RX ADMIN — NYSTATIN 500000 UNITS: 100000 SUSPENSION ORAL at 13:28

## 2019-01-01 RX ADMIN — TRAZODONE HYDROCHLORIDE 50 MG: 50 TABLET ORAL at 20:10

## 2019-01-01 RX ADMIN — PANTOPRAZOLE SODIUM 40 MG: 40 TABLET, DELAYED RELEASE ORAL at 08:41

## 2019-01-01 RX ADMIN — OYSTER SHELL CALCIUM WITH VITAMIN D 1 TABLET: 500; 200 TABLET, FILM COATED ORAL at 08:26

## 2019-01-01 RX ADMIN — PANTOPRAZOLE SODIUM 40 MG: 40 TABLET, DELAYED RELEASE ORAL at 15:56

## 2019-01-01 RX ADMIN — Medication 40 MG: at 06:46

## 2019-01-01 RX ADMIN — Medication 10 MEQ: at 19:07

## 2019-01-01 RX ADMIN — LACTULOSE 20 G: 20 SOLUTION ORAL at 08:59

## 2019-01-01 RX ADMIN — LACTULOSE 10 G: 20 SOLUTION ORAL at 08:38

## 2019-01-01 RX ADMIN — Medication 40 MG: at 06:36

## 2019-01-01 RX ADMIN — LORAZEPAM 0.5 MG: 2 INJECTION INTRAMUSCULAR; INTRAVENOUS at 19:52

## 2019-01-01 RX ADMIN — Medication 100 MG: at 08:01

## 2019-01-01 RX ADMIN — GABAPENTIN 100 MG: 100 CAPSULE ORAL at 09:08

## 2019-01-01 RX ADMIN — Medication 100 MG: at 08:17

## 2019-01-01 RX ADMIN — MIDODRINE HYDROCHLORIDE 10 MG: 5 TABLET ORAL at 09:06

## 2019-01-01 RX ADMIN — MIDODRINE HYDROCHLORIDE 10 MG: 5 TABLET ORAL at 17:04

## 2019-01-01 RX ADMIN — ALBUMIN HUMAN 12.5 G: 0.25 SOLUTION INTRAVENOUS at 15:55

## 2019-01-01 RX ADMIN — RIFAXIMIN 550 MG: 550 TABLET ORAL at 21:37

## 2019-01-01 RX ADMIN — Medication 40 MG: at 16:38

## 2019-01-01 RX ADMIN — LACTULOSE 10 G: 20 SOLUTION ORAL at 16:33

## 2019-01-01 RX ADMIN — GABAPENTIN 100 MG: 100 CAPSULE ORAL at 20:10

## 2019-01-01 RX ADMIN — OYSTER SHELL CALCIUM WITH VITAMIN D 1 TABLET: 500; 200 TABLET, FILM COATED ORAL at 12:51

## 2019-01-01 RX ADMIN — TRAZODONE HYDROCHLORIDE 50 MG: 50 TABLET ORAL at 22:49

## 2019-01-01 RX ADMIN — MIDODRINE HYDROCHLORIDE 10 MG: 5 TABLET ORAL at 08:17

## 2019-01-01 RX ADMIN — MELATONIN 2000 UNITS: at 08:01

## 2019-01-01 RX ADMIN — LACTULOSE 20 G: 20 SOLUTION ORAL at 08:07

## 2019-01-01 RX ADMIN — THERA TABS 1 TABLET: TAB at 08:59

## 2019-01-01 RX ADMIN — CYANOCOBALAMIN TAB 1000 MCG 1000 MCG: 1000 TAB at 09:06

## 2019-01-01 RX ADMIN — OLANZAPINE 2.5 MG: 5 TABLET, ORALLY DISINTEGRATING ORAL at 03:36

## 2019-01-01 RX ADMIN — DICLOFENAC SODIUM 2 G: 10 GEL TOPICAL at 16:11

## 2019-01-01 RX ADMIN — LACTULOSE 20 G: 20 SOLUTION ORAL at 02:08

## 2019-01-01 RX ADMIN — OMEPRAZOLE 20 MG: 20 CAPSULE, DELAYED RELEASE ORAL at 09:05

## 2019-01-01 RX ADMIN — POTASSIUM CHLORIDE 20 MEQ: 1.5 POWDER, FOR SOLUTION ORAL at 06:34

## 2019-01-01 RX ADMIN — POTASSIUM CHLORIDE 20 MEQ: 1.5 POWDER, FOR SOLUTION ORAL at 10:32

## 2019-01-01 RX ADMIN — MULTIVITAMIN 15 ML: LIQUID ORAL at 08:36

## 2019-01-01 RX ADMIN — SODIUM CHLORIDE: 9 INJECTION, SOLUTION INTRAVENOUS at 16:37

## 2019-01-01 RX ADMIN — MIDODRINE HYDROCHLORIDE 10 MG: 5 TABLET ORAL at 21:29

## 2019-01-01 RX ADMIN — MELATONIN 2000 UNITS: at 08:37

## 2019-01-01 RX ADMIN — PANTOPRAZOLE SODIUM 40 MG: 40 TABLET, DELAYED RELEASE ORAL at 07:29

## 2019-01-01 RX ADMIN — Medication: at 01:16

## 2019-01-01 RX ADMIN — OMEPRAZOLE 20 MG: 20 CAPSULE, DELAYED RELEASE ORAL at 08:17

## 2019-01-01 RX ADMIN — ALBUMIN HUMAN 12.5 G: 0.25 SOLUTION INTRAVENOUS at 11:29

## 2019-01-01 RX ADMIN — SPIRONOLACTONE 25 MG: 25 TABLET ORAL at 15:56

## 2019-01-01 RX ADMIN — POTASSIUM CHLORIDE, DEXTROSE MONOHYDRATE AND SODIUM CHLORIDE: 150; 5; 450 INJECTION, SOLUTION INTRAVENOUS at 10:02

## 2019-01-01 RX ADMIN — ERTAPENEM SODIUM 1 G: 1 INJECTION, POWDER, LYOPHILIZED, FOR SOLUTION INTRAMUSCULAR; INTRAVENOUS at 20:10

## 2019-01-01 RX ADMIN — SPIRONOLACTONE 25 MG: 25 TABLET ORAL at 15:38

## 2019-01-01 RX ADMIN — GABAPENTIN 100 MG: 100 CAPSULE ORAL at 10:16

## 2019-01-01 RX ADMIN — PANTOPRAZOLE SODIUM 40 MG: 40 TABLET, DELAYED RELEASE ORAL at 06:18

## 2019-01-01 RX ADMIN — Medication 75 MG: at 23:23

## 2019-01-01 RX ADMIN — SODIUM CHLORIDE 500 ML: 9 INJECTION, SOLUTION INTRAVENOUS at 09:03

## 2019-01-01 RX ADMIN — THERA TABS 1 TABLET: TAB at 08:17

## 2019-01-01 RX ADMIN — DEXTRAN 70 AND HYPROMELLOSE 2910 2 DROP: 1; 3 SOLUTION/ DROPS OPHTHALMIC at 20:45

## 2019-01-01 RX ADMIN — VILAZODONE HYDROCHLORIDE 40 MG: 20 TABLET ORAL at 10:16

## 2019-01-01 RX ADMIN — CYANOCOBALAMIN TAB 1000 MCG 1000 MCG: 1000 TAB at 08:38

## 2019-01-01 RX ADMIN — PANTOPRAZOLE SODIUM 40 MG: 40 TABLET, DELAYED RELEASE ORAL at 06:55

## 2019-01-01 RX ADMIN — MELATONIN 2000 UNITS: at 08:17

## 2019-01-01 RX ADMIN — FUROSEMIDE 40 MG: 40 TABLET ORAL at 16:06

## 2019-01-01 RX ADMIN — OYSTER SHELL CALCIUM WITH VITAMIN D 1 TABLET: 500; 200 TABLET, FILM COATED ORAL at 13:28

## 2019-01-01 RX ADMIN — LORAZEPAM 0.5 MG: 2 INJECTION INTRAMUSCULAR at 19:52

## 2019-01-01 RX ADMIN — THERA TABS 1 TABLET: TAB at 16:32

## 2019-01-01 RX ADMIN — ALBUMIN HUMAN 25 G: 0.25 SOLUTION INTRAVENOUS at 15:48

## 2019-01-01 RX ADMIN — PANTOPRAZOLE SODIUM 40 MG: 40 TABLET, DELAYED RELEASE ORAL at 06:49

## 2019-01-01 RX ADMIN — GABAPENTIN 100 MG: 100 CAPSULE ORAL at 12:15

## 2019-01-01 RX ADMIN — Medication 5 MG: at 22:10

## 2019-01-01 RX ADMIN — VILAZODONE HYDROCHLORIDE 40 MG: 20 TABLET ORAL at 08:17

## 2019-01-01 RX ADMIN — PANTOPRAZOLE SODIUM 40 MG: 40 TABLET, DELAYED RELEASE ORAL at 15:37

## 2019-01-01 RX ADMIN — FOLIC ACID 1 MG: 1 TABLET ORAL at 09:06

## 2019-01-01 RX ADMIN — PHYTONADIONE 10 MG: 10 INJECTION, EMULSION INTRAMUSCULAR; INTRAVENOUS; SUBCUTANEOUS at 14:33

## 2019-01-01 RX ADMIN — LACTULOSE 10 G: 20 SOLUTION ORAL at 08:26

## 2019-01-01 RX ADMIN — ACETAMINOPHEN 650 MG: 325 TABLET, FILM COATED ORAL at 16:14

## 2019-01-01 RX ADMIN — MAGNESIUM SULFATE HEPTAHYDRATE 4 G: 40 INJECTION, SOLUTION INTRAVENOUS at 16:50

## 2019-01-01 RX ADMIN — Medication 40 MG: at 16:32

## 2019-01-01 RX ADMIN — CYANOCOBALAMIN TAB 1000 MCG 1000 MCG: 1000 TAB at 08:07

## 2019-01-01 RX ADMIN — TRAZODONE HYDROCHLORIDE 50 MG: 50 TABLET ORAL at 21:27

## 2019-01-01 RX ADMIN — LIDOCAINE HYDROCHLORIDE 10 ML: 10 INJECTION, SOLUTION EPIDURAL; INFILTRATION; INTRACAUDAL; PERINEURAL at 14:21

## 2019-01-01 RX ADMIN — LORAZEPAM 0.5 MG: 2 INJECTION INTRAMUSCULAR; INTRAVENOUS at 20:40

## 2019-01-01 RX ADMIN — MENTHOL 1 PATCH: 205.5 PATCH TOPICAL at 16:41

## 2019-01-01 RX ADMIN — NYSTATIN 500000 UNITS: 100000 SUSPENSION ORAL at 19:23

## 2019-01-01 RX ADMIN — THIAMINE HYDROCHLORIDE 100 MG: 100 INJECTION, SOLUTION INTRAMUSCULAR; INTRAVENOUS at 08:36

## 2019-01-01 RX ADMIN — Medication 100 MG: at 16:32

## 2019-01-01 RX ADMIN — DICLOFENAC SODIUM 2 G: 10 GEL TOPICAL at 12:15

## 2019-01-01 RX ADMIN — LIDOCAINE HYDROCHLORIDE 10 ML: 10 INJECTION, SOLUTION EPIDURAL; INFILTRATION; INTRACAUDAL; PERINEURAL at 09:54

## 2019-01-01 RX ADMIN — GABAPENTIN 100 MG: 100 CAPSULE ORAL at 08:59

## 2019-01-01 RX ADMIN — POTASSIUM CHLORIDE 20 MEQ: 1500 TABLET, EXTENDED RELEASE ORAL at 15:34

## 2019-01-01 RX ADMIN — GABAPENTIN 100 MG: 100 CAPSULE ORAL at 08:37

## 2019-01-01 RX ADMIN — OYSTER SHELL CALCIUM WITH VITAMIN D 1 TABLET: 500; 200 TABLET, FILM COATED ORAL at 11:59

## 2019-01-01 RX ADMIN — DIPHENHYDRAMINE HYDROCHLORIDE AND LIDOCAINE HYDROCHLORIDE AND ALUMINUM HYDROXIDE AND MAGNESIUM HYDRO 10 ML: KIT at 18:32

## 2019-01-01 RX ADMIN — MELATONIN 2000 UNITS: at 10:17

## 2019-01-01 RX ADMIN — OMEPRAZOLE 20 MG: 20 CAPSULE, DELAYED RELEASE ORAL at 08:59

## 2019-01-01 RX ADMIN — OMEPRAZOLE 20 MG: 20 CAPSULE, DELAYED RELEASE ORAL at 16:32

## 2019-01-01 RX ADMIN — VILAZODONE HYDROCHLORIDE 40 MG: 20 TABLET ORAL at 16:30

## 2019-01-01 RX ADMIN — LACTULOSE 20 G: 10 SOLUTION ORAL at 08:46

## 2019-01-01 RX ADMIN — THERA TABS 1 TABLET: TAB at 08:55

## 2019-01-01 RX ADMIN — RIFAXIMIN 550 MG: 550 TABLET ORAL at 21:27

## 2019-01-01 RX ADMIN — Medication: at 11:47

## 2019-01-01 RX ADMIN — THERA TABS 1 TABLET: TAB at 10:21

## 2019-01-01 RX ADMIN — NYSTATIN 500000 UNITS: 100000 SUSPENSION ORAL at 12:51

## 2019-01-01 RX ADMIN — Medication 400 MG: at 08:26

## 2019-01-01 RX ADMIN — ACETAMINOPHEN 650 MG: 325 TABLET, FILM COATED ORAL at 00:04

## 2019-01-01 RX ADMIN — Medication 100 MG: at 10:21

## 2019-01-01 RX ADMIN — MELATONIN 2000 UNITS: at 10:21

## 2019-01-01 RX ADMIN — POTASSIUM CHLORIDE 40 MEQ: 1500 TABLET, EXTENDED RELEASE ORAL at 21:54

## 2019-01-01 RX ADMIN — OMEPRAZOLE 20 MG: 20 CAPSULE, DELAYED RELEASE ORAL at 08:26

## 2019-01-01 RX ADMIN — Medication 100 MG: at 09:06

## 2019-01-01 RX ADMIN — Medication 100 MG: at 15:33

## 2019-01-01 RX ADMIN — SPIRONOLACTONE 25 MG: 25 TABLET ORAL at 08:59

## 2019-01-01 RX ADMIN — LACTULOSE 20 G: 20 SOLUTION ORAL at 08:48

## 2019-01-01 RX ADMIN — DICLOFENAC SODIUM 4 G: 10 GEL TOPICAL at 22:12

## 2019-01-01 RX ADMIN — MULTIVITAMIN 15 ML: LIQUID ORAL at 10:21

## 2019-01-01 RX ADMIN — GABAPENTIN 100 MG: 100 CAPSULE ORAL at 08:18

## 2019-01-01 RX ADMIN — INFLUENZA A VIRUS A/BRISBANE/02/2018 (H1N1) RECOMBINANT HEMAGGLUTININ ANTIGEN, INFLUENZA A VIRUS A/KANSAS/14/2017 (H3N2) RECOMBINANT HEMAGGLUTININ ANTIGEN, INFLUENZA B VIRUS B/PHUKET/3073/2013 RECOMBINANT HEMAGGLUTININ ANTIGEN, AND INFLUENZA B VIRUS B/MARYLAND/15/2016 RECOMBINANT HEMAGGLUTININ ANTIGEN 0.5 ML: 45; 45; 45; 45 INJECTION INTRAMUSCULAR at 12:15

## 2019-01-01 RX ADMIN — Medication 75 MG: at 22:10

## 2019-01-01 RX ADMIN — FOLIC ACID 1 MG: 1 TABLET ORAL at 08:59

## 2019-01-01 RX ADMIN — NYSTATIN 500000 UNITS: 100000 SUSPENSION ORAL at 21:28

## 2019-01-01 RX ADMIN — MELATONIN 2000 UNITS: at 16:30

## 2019-01-01 RX ADMIN — DICLOFENAC SODIUM 4 G: 10 GEL TOPICAL at 12:51

## 2019-01-01 RX ADMIN — SPIRONOLACTONE 25 MG: 25 TABLET ORAL at 16:00

## 2019-01-01 RX ADMIN — MIDODRINE HYDROCHLORIDE 10 MG: 5 TABLET ORAL at 18:50

## 2019-01-01 RX ADMIN — LACTULOSE 20 G: 20 SOLUTION ORAL at 15:19

## 2019-01-01 RX ADMIN — OYSTER SHELL CALCIUM WITH VITAMIN D 1 TABLET: 500; 200 TABLET, FILM COATED ORAL at 08:17

## 2019-01-01 RX ADMIN — GABAPENTIN 100 MG: 100 CAPSULE ORAL at 10:21

## 2019-01-01 RX ADMIN — OYSTER SHELL CALCIUM WITH VITAMIN D 1 TABLET: 500; 200 TABLET, FILM COATED ORAL at 17:04

## 2019-01-01 RX ADMIN — MIDODRINE HYDROCHLORIDE 10 MG: 5 TABLET ORAL at 11:38

## 2019-01-01 RX ADMIN — Medication 10 MEQ: at 23:22

## 2019-01-01 RX ADMIN — LACTULOSE 200 G: 10 SOLUTION ORAL; RECTAL at 01:46

## 2019-01-01 RX ADMIN — MIDODRINE HYDROCHLORIDE 10 MG: 5 TABLET ORAL at 14:43

## 2019-01-01 RX ADMIN — LACTULOSE 20 G: 20 SOLUTION ORAL at 21:27

## 2019-01-01 RX ADMIN — THIAMINE HYDROCHLORIDE 100 MG: 100 INJECTION, SOLUTION INTRAMUSCULAR; INTRAVENOUS at 08:48

## 2019-01-01 RX ADMIN — CEFTRIAXONE SODIUM 1 G: 1 INJECTION, POWDER, FOR SOLUTION INTRAMUSCULAR; INTRAVENOUS at 05:23

## 2019-01-01 RX ADMIN — ALBUMIN HUMAN 12.5 G: 0.25 SOLUTION INTRAVENOUS at 11:57

## 2019-01-01 RX ADMIN — ALBUMIN HUMAN 25 G: 0.25 SOLUTION INTRAVENOUS at 16:34

## 2019-01-01 RX ADMIN — Medication: at 16:06

## 2019-01-01 RX ADMIN — CYANOCOBALAMIN TAB 1000 MCG 1000 MCG: 1000 TAB at 10:16

## 2019-01-01 RX ADMIN — MELATONIN 2000 UNITS: at 08:26

## 2019-01-01 RX ADMIN — SPIRONOLACTONE 25 MG: 25 TABLET ORAL at 16:24

## 2019-01-01 RX ADMIN — SPIRONOLACTONE 25 MG: 25 TABLET ORAL at 08:55

## 2019-01-01 RX ADMIN — Medication 5 MG: at 21:26

## 2019-01-01 RX ADMIN — THIAMINE HYDROCHLORIDE 100 MG: 100 INJECTION, SOLUTION INTRAMUSCULAR; INTRAVENOUS at 10:42

## 2019-01-01 RX ADMIN — Medication 75 MG: at 21:33

## 2019-01-01 RX ADMIN — THERA TABS 1 TABLET: TAB at 09:05

## 2019-01-01 RX ADMIN — GABAPENTIN 100 MG: 100 CAPSULE ORAL at 20:22

## 2019-01-01 RX ADMIN — ALBUMIN HUMAN 25 G: 0.25 SOLUTION INTRAVENOUS at 08:28

## 2019-01-01 RX ADMIN — CYANOCOBALAMIN TAB 1000 MCG 1000 MCG: 1000 TAB at 08:01

## 2019-01-01 RX ADMIN — DICLOFENAC SODIUM 2 G: 10 GEL TOPICAL at 20:15

## 2019-01-01 RX ADMIN — LACTULOSE 10 G: 20 SOLUTION ORAL at 08:18

## 2019-01-01 RX ADMIN — THIAMINE HYDROCHLORIDE 100 MG: 100 INJECTION, SOLUTION INTRAMUSCULAR; INTRAVENOUS at 08:59

## 2019-01-01 RX ADMIN — TRAZODONE HYDROCHLORIDE 50 MG: 50 TABLET ORAL at 22:57

## 2019-01-01 RX ADMIN — LACTULOSE 20 G: 20 SOLUTION ORAL at 08:55

## 2019-01-01 RX ADMIN — FUROSEMIDE 40 MG: 40 TABLET ORAL at 11:59

## 2019-01-01 RX ADMIN — LIDOCAINE 1 PATCH: 560 PATCH PERCUTANEOUS; TOPICAL; TRANSDERMAL at 20:33

## 2019-01-01 RX ADMIN — CYANOCOBALAMIN TAB 1000 MCG 1000 MCG: 1000 TAB at 15:35

## 2019-01-01 RX ADMIN — LACTULOSE 20 G: 20 SOLUTION ORAL at 21:07

## 2019-01-01 RX ADMIN — TRAZODONE HYDROCHLORIDE 50 MG: 50 TABLET ORAL at 21:40

## 2019-01-01 RX ADMIN — Medication 75 MG: at 21:27

## 2019-01-01 RX ADMIN — GABAPENTIN 100 MG: 100 CAPSULE ORAL at 16:31

## 2019-01-01 RX ADMIN — SPIRONOLACTONE 25 MG: 25 TABLET ORAL at 16:36

## 2019-01-01 RX ADMIN — SPIRONOLACTONE 25 MG: 25 TABLET ORAL at 08:49

## 2019-01-01 RX ADMIN — SODIUM CHLORIDE 2000 ML: 9 INJECTION, SOLUTION INTRAVENOUS at 18:56

## 2019-01-01 RX ADMIN — TRAZODONE HYDROCHLORIDE 50 MG: 50 TABLET ORAL at 21:08

## 2019-01-01 RX ADMIN — CYANOCOBALAMIN TAB 1000 MCG 1000 MCG: 1000 TAB at 16:31

## 2019-01-01 RX ADMIN — SPIRONOLACTONE 50 MG: 25 TABLET ORAL at 10:17

## 2019-01-01 RX ADMIN — MULTIVITAMIN 15 ML: LIQUID ORAL at 08:50

## 2019-01-01 RX ADMIN — Medication 5 MG: at 21:29

## 2019-01-01 RX ADMIN — MICONAZOLE NITRATE: 20 POWDER TOPICAL at 06:39

## 2019-01-01 RX ADMIN — FOLIC ACID 1 MG: 1 TABLET ORAL at 15:34

## 2019-01-01 RX ADMIN — TRAZODONE HYDROCHLORIDE 50 MG: 50 TABLET ORAL at 21:37

## 2019-01-01 RX ADMIN — DICLOFENAC SODIUM 4 G: 10 GEL TOPICAL at 08:25

## 2019-01-01 RX ADMIN — OYSTER SHELL CALCIUM WITH VITAMIN D 1 TABLET: 500; 200 TABLET, FILM COATED ORAL at 10:21

## 2019-01-01 RX ADMIN — RIFAXIMIN 550 MG: 550 TABLET ORAL at 09:00

## 2019-01-01 RX ADMIN — TRAZODONE HYDROCHLORIDE 50 MG: 50 TABLET ORAL at 21:10

## 2019-01-01 RX ADMIN — RIFAXIMIN 550 MG: 550 TABLET ORAL at 10:25

## 2019-01-01 RX ADMIN — Medication 75 MG: at 21:28

## 2019-01-01 RX ADMIN — Medication 100 MG: at 08:48

## 2019-01-01 RX ADMIN — RIFAXIMIN 550 MG: 550 TABLET ORAL at 08:39

## 2019-01-01 RX ADMIN — CYANOCOBALAMIN TAB 1000 MCG 1000 MCG: 1000 TAB at 08:59

## 2019-01-01 RX ADMIN — POTASSIUM & SODIUM PHOSPHATES POWDER PACK 280-160-250 MG 2 PACKET: 280-160-250 PACK at 09:11

## 2019-01-01 RX ADMIN — RIFAXIMIN 550 MG: 550 TABLET ORAL at 08:49

## 2019-01-01 RX ADMIN — GABAPENTIN 100 MG: 100 CAPSULE ORAL at 08:01

## 2019-01-01 RX ADMIN — DICLOFENAC SODIUM 2 G: 10 GEL TOPICAL at 15:49

## 2019-01-01 RX ADMIN — SPIRONOLACTONE 25 MG: 25 TABLET ORAL at 07:58

## 2019-01-01 RX ADMIN — MELATONIN 2000 UNITS: at 09:08

## 2019-01-01 RX ADMIN — FOLIC ACID 1 MG: 1 TABLET ORAL at 16:32

## 2019-01-01 RX ADMIN — LACTULOSE 20 G: 20 SOLUTION ORAL at 21:38

## 2019-01-01 RX ADMIN — LACTULOSE 20 G: 20 SOLUTION ORAL at 15:37

## 2019-01-01 RX ADMIN — LACTULOSE 20 G: 20 SOLUTION ORAL at 16:23

## 2019-01-01 RX ADMIN — FOLIC ACID 1 MG: 1 TABLET ORAL at 10:17

## 2019-01-01 RX ADMIN — Medication 100 MG: at 09:17

## 2019-01-01 RX ADMIN — THERA TABS 1 TABLET: TAB at 15:33

## 2019-01-01 RX ADMIN — MULTIVITAMIN 15 ML: LIQUID ORAL at 08:24

## 2019-01-01 RX ADMIN — LACTULOSE 20 G: 20 SOLUTION ORAL at 07:58

## 2019-01-01 RX ADMIN — LACTULOSE 10 G: 20 SOLUTION ORAL at 09:05

## 2019-01-01 RX ADMIN — MIDODRINE HYDROCHLORIDE 10 MG: 5 TABLET ORAL at 12:51

## 2019-01-01 RX ADMIN — RIFAXIMIN 550 MG: 550 TABLET ORAL at 22:58

## 2019-01-01 RX ADMIN — LACTULOSE 20 G: 20 SOLUTION ORAL at 16:35

## 2019-01-01 RX ADMIN — THERA TABS 1 TABLET: TAB at 08:26

## 2019-01-01 RX ADMIN — Medication 5 MG: at 21:27

## 2019-01-01 RX ADMIN — MICONAZOLE NITRATE: 20 POWDER TOPICAL at 14:33

## 2019-01-01 RX ADMIN — THERA TABS 1 TABLET: TAB at 08:07

## 2019-01-01 RX ADMIN — LACTULOSE 20 G: 20 SOLUTION ORAL at 08:01

## 2019-01-01 RX ADMIN — OMEPRAZOLE 20 MG: 20 CAPSULE, DELAYED RELEASE ORAL at 15:35

## 2019-01-01 RX ADMIN — MELATONIN 2000 UNITS: at 08:59

## 2019-01-01 RX ADMIN — Medication 2 G: at 22:00

## 2019-01-01 RX ADMIN — THERA TABS 1 TABLET: TAB at 08:40

## 2019-01-01 RX ADMIN — OMEPRAZOLE 20 MG: 20 CAPSULE, DELAYED RELEASE ORAL at 08:01

## 2019-01-01 RX ADMIN — OMEPRAZOLE 20 MG: 20 CAPSULE, DELAYED RELEASE ORAL at 10:16

## 2019-01-01 RX ADMIN — PANTOPRAZOLE SODIUM 40 MG: 40 TABLET, DELAYED RELEASE ORAL at 16:24

## 2019-01-01 RX ADMIN — OLANZAPINE 2.5 MG: 5 TABLET, ORALLY DISINTEGRATING ORAL at 21:10

## 2019-01-01 RX ADMIN — VILAZODONE HYDROCHLORIDE 40 MG: 20 TABLET ORAL at 10:21

## 2019-01-01 RX ADMIN — ERTAPENEM SODIUM 500 MG: 1 INJECTION, POWDER, LYOPHILIZED, FOR SOLUTION INTRAMUSCULAR; INTRAVENOUS at 22:49

## 2019-01-01 RX ADMIN — DEXTRAN 70 AND HYPROMELLOSE 2910 2 DROP: 1; 3 SOLUTION/ DROPS OPHTHALMIC at 22:35

## 2019-01-01 RX ADMIN — LACTULOSE 20 G: 10 SOLUTION ORAL at 21:00

## 2019-01-01 RX ADMIN — THERA TABS 1 TABLET: TAB at 10:16

## 2019-01-01 RX ADMIN — PANTOPRAZOLE SODIUM 40 MG: 40 TABLET, DELAYED RELEASE ORAL at 16:00

## 2019-01-01 RX ADMIN — MIDODRINE HYDROCHLORIDE 10 MG: 5 TABLET ORAL at 09:10

## 2019-01-01 RX ADMIN — ALBUMIN HUMAN 25 G: 0.25 SOLUTION INTRAVENOUS at 02:16

## 2019-01-01 RX ADMIN — POTASSIUM & SODIUM PHOSPHATES POWDER PACK 280-160-250 MG 2 PACKET: 280-160-250 PACK at 16:37

## 2019-01-01 RX ADMIN — POTASSIUM & SODIUM PHOSPHATES POWDER PACK 280-160-250 MG 2 PACKET: 280-160-250 PACK at 22:57

## 2019-01-01 RX ADMIN — MIDODRINE HYDROCHLORIDE 10 MG: 5 TABLET ORAL at 11:59

## 2019-01-01 RX ADMIN — SPIRONOLACTONE 25 MG: 25 TABLET ORAL at 03:27

## 2019-01-01 RX ADMIN — LACTULOSE 20 G: 20 SOLUTION ORAL at 14:33

## 2019-01-01 RX ADMIN — Medication 40 MG: at 16:51

## 2019-01-01 RX ADMIN — VILAZODONE HYDROCHLORIDE 40 MG: 20 TABLET ORAL at 08:12

## 2019-01-01 RX ADMIN — LACTULOSE 20 G: 20 SOLUTION ORAL at 09:16

## 2019-01-01 RX ADMIN — VILAZODONE HYDROCHLORIDE 40 MG: 20 TABLET ORAL at 09:06

## 2019-01-01 RX ADMIN — Medication 400 MG: at 08:18

## 2019-01-01 RX ADMIN — RIFAXIMIN 550 MG: 550 TABLET ORAL at 21:00

## 2019-01-01 RX ADMIN — POTASSIUM CHLORIDE 40 MEQ: 1.5 POWDER, FOR SOLUTION ORAL at 12:16

## 2019-01-01 RX ADMIN — LACTULOSE 20 G: 20 SOLUTION ORAL at 20:34

## 2019-01-01 RX ADMIN — MULTIVITAMIN 15 ML: LIQUID ORAL at 08:58

## 2019-01-01 RX ADMIN — Medication: at 19:07

## 2019-01-01 RX ADMIN — NYSTATIN 500000 UNITS: 100000 SUSPENSION ORAL at 08:18

## 2019-01-01 RX ADMIN — TRAMADOL HYDROCHLORIDE 50 MG: 50 TABLET, FILM COATED ORAL at 02:08

## 2019-01-01 RX ADMIN — DEXTROSE AND SODIUM CHLORIDE: 5; 450 INJECTION, SOLUTION INTRAVENOUS at 17:14

## 2019-01-01 RX ADMIN — LACTULOSE 20 G: 20 SOLUTION ORAL at 22:57

## 2019-01-01 RX ADMIN — Medication 40 MG: at 16:36

## 2019-01-01 RX ADMIN — SPIRONOLACTONE 25 MG: 25 TABLET ORAL at 08:45

## 2019-01-01 RX ADMIN — LACTULOSE 20 G: 20 SOLUTION ORAL at 09:08

## 2019-01-01 RX ADMIN — DICLOFENAC SODIUM 2 G: 10 GEL TOPICAL at 08:04

## 2019-01-01 RX ADMIN — HYDROXYZINE HYDROCHLORIDE 25 MG: 25 TABLET, FILM COATED ORAL at 13:50

## 2019-01-01 RX ADMIN — THERA TABS 1 TABLET: TAB at 08:37

## 2019-01-01 RX ADMIN — PANTOPRAZOLE SODIUM 40 MG: 40 INJECTION, POWDER, FOR SOLUTION INTRAVENOUS at 22:30

## 2019-01-01 RX ADMIN — ACETAMINOPHEN 650 MG: 325 TABLET, FILM COATED ORAL at 10:22

## 2019-01-01 RX ADMIN — DICLOFENAC SODIUM 2 G: 10 GEL TOPICAL at 17:00

## 2019-01-01 RX ADMIN — POTASSIUM CHLORIDE 40 MEQ: 1.5 POWDER, FOR SOLUTION ORAL at 08:46

## 2019-01-01 RX ADMIN — RIFAXIMIN 550 MG: 550 TABLET ORAL at 21:23

## 2019-01-01 RX ADMIN — LACTULOSE 20 G: 20 SOLUTION ORAL at 16:36

## 2019-01-01 RX ADMIN — NYSTATIN 500000 UNITS: 100000 SUSPENSION ORAL at 18:51

## 2019-01-01 RX ADMIN — SPIRONOLACTONE 50 MG: 25 TABLET ORAL at 10:32

## 2019-01-01 RX ADMIN — SODIUM CHLORIDE 500 ML: 9 INJECTION, SOLUTION INTRAVENOUS at 11:05

## 2019-01-01 RX ADMIN — GABAPENTIN 100 MG: 100 CAPSULE ORAL at 20:33

## 2019-01-01 RX ADMIN — Medication 40 MG: at 06:33

## 2019-01-01 RX ADMIN — FOLIC ACID: 5 INJECTION, SOLUTION INTRAMUSCULAR; INTRAVENOUS; SUBCUTANEOUS at 19:07

## 2019-01-01 RX ADMIN — LACTULOSE 20 G: 20 SOLUTION ORAL at 16:00

## 2019-01-01 RX ADMIN — RIFAXIMIN 550 MG: 550 TABLET ORAL at 21:40

## 2019-01-01 RX ADMIN — Medication 400 MG: at 10:21

## 2019-01-01 RX ADMIN — Medication 100 MG: at 10:16

## 2019-01-01 RX ADMIN — LIDOCAINE 1 PATCH: 560 PATCH PERCUTANEOUS; TOPICAL; TRANSDERMAL at 20:11

## 2019-01-01 RX ADMIN — PANTOPRAZOLE SODIUM 40 MG: 40 TABLET, DELAYED RELEASE ORAL at 09:17

## 2019-01-01 RX ADMIN — Medication 400 MG: at 11:59

## 2019-01-01 RX ADMIN — OMEPRAZOLE 20 MG: 20 CAPSULE, DELAYED RELEASE ORAL at 08:37

## 2019-01-01 RX ADMIN — VILAZODONE HYDROCHLORIDE 40 MG: 20 TABLET ORAL at 08:01

## 2019-01-01 RX ADMIN — THIAMINE HYDROCHLORIDE 100 MG: 100 INJECTION, SOLUTION INTRAMUSCULAR; INTRAVENOUS at 12:09

## 2019-01-01 RX ADMIN — MIDODRINE HYDROCHLORIDE 10 MG: 5 TABLET ORAL at 13:28

## 2019-01-01 RX ADMIN — HYDROXYZINE HYDROCHLORIDE 25 MG: 25 TABLET, FILM COATED ORAL at 14:55

## 2019-01-01 RX ADMIN — DICLOFENAC SODIUM 2 G: 10 GEL TOPICAL at 09:04

## 2019-01-01 RX ADMIN — FUROSEMIDE 40 MG: 40 TABLET ORAL at 10:21

## 2019-01-01 RX ADMIN — OYSTER SHELL CALCIUM WITH VITAMIN D 1 TABLET: 500; 200 TABLET, FILM COATED ORAL at 18:50

## 2019-01-01 RX ADMIN — TRAZODONE HYDROCHLORIDE 50 MG: 50 TABLET ORAL at 22:40

## 2019-01-01 RX ADMIN — Medication 100 MG: at 08:37

## 2019-01-01 RX ADMIN — RIFAXIMIN 550 MG: 550 TABLET ORAL at 20:59

## 2019-01-01 RX ADMIN — LACTULOSE 20 G: 20 SOLUTION ORAL at 15:56

## 2019-01-01 RX ADMIN — Medication 100 MG: at 08:07

## 2019-01-01 RX ADMIN — SPIRONOLACTONE 25 MG: 25 TABLET ORAL at 09:17

## 2019-01-01 RX ADMIN — DICLOFENAC SODIUM 2 G: 10 GEL TOPICAL at 22:41

## 2019-01-01 RX ADMIN — SODIUM CHLORIDE, PRESERVATIVE FREE 500 ML: 5 INJECTION INTRAVENOUS at 11:45

## 2019-01-01 RX ADMIN — SPIRONOLACTONE 50 MG: 25 TABLET ORAL at 13:06

## 2019-01-01 RX ADMIN — Medication 100 MG: at 08:55

## 2019-01-01 RX ADMIN — SODIUM PHOSPHATE, MONOBASIC, MONOHYDRATE 15 MMOL: 276; 142 INJECTION, SOLUTION INTRAVENOUS at 20:46

## 2019-01-01 RX ADMIN — LACTULOSE 20 G: 20 SOLUTION ORAL at 15:32

## 2019-01-01 RX ADMIN — ALBUMIN HUMAN 25 G: 0.25 SOLUTION INTRAVENOUS at 18:43

## 2019-01-01 RX ADMIN — TRAZODONE HYDROCHLORIDE 50 MG: 50 TABLET ORAL at 22:02

## 2019-01-01 RX ADMIN — MIDODRINE HYDROCHLORIDE 10 MG: 5 TABLET ORAL at 08:25

## 2019-01-01 RX ADMIN — PANTOPRAZOLE SODIUM 40 MG: 40 TABLET, DELAYED RELEASE ORAL at 15:34

## 2019-01-01 RX ADMIN — VILAZODONE HYDROCHLORIDE 40 MG: 20 TABLET ORAL at 08:26

## 2019-01-01 RX ADMIN — FOLIC ACID 1 MG: 1 TABLET ORAL at 08:01

## 2019-01-01 RX ADMIN — LIDOCAINE 1 PATCH: 560 PATCH PERCUTANEOUS; TOPICAL; TRANSDERMAL at 20:29

## 2019-01-01 RX ADMIN — GABAPENTIN 100 MG: 100 CAPSULE ORAL at 08:26

## 2019-01-01 RX ADMIN — THERA TABS 1 TABLET: TAB at 08:42

## 2019-01-01 RX ADMIN — CEFTRIAXONE SODIUM 1 G: 1 INJECTION, POWDER, FOR SOLUTION INTRAMUSCULAR; INTRAVENOUS at 05:07

## 2019-01-01 RX ADMIN — FUROSEMIDE 20 MG: 10 INJECTION, SOLUTION INTRAVENOUS at 00:48

## 2019-01-01 RX ADMIN — MICONAZOLE NITRATE: 20 POWDER TOPICAL at 12:50

## 2019-01-01 RX ADMIN — Medication 100 MG: at 07:58

## 2019-01-01 RX ADMIN — CEFTRIAXONE SODIUM 1 G: 1 INJECTION, POWDER, FOR SOLUTION INTRAMUSCULAR; INTRAVENOUS at 20:32

## 2019-01-01 ASSESSMENT — ACTIVITIES OF DAILY LIVING (ADL)
ADLS_ACUITY_SCORE: 22
ADLS_ACUITY_SCORE: 16
ADLS_ACUITY_SCORE: 19
ADLS_ACUITY_SCORE: 17
ADLS_ACUITY_SCORE: 13
ADLS_ACUITY_SCORE: 13
ADLS_ACUITY_SCORE: 17
RETIRED_EATING: 0-->INDEPENDENT
ADLS_ACUITY_SCORE: 10.5
DRESS: 0-->INDEPENDENT
ADLS_ACUITY_SCORE: 15
ADLS_ACUITY_SCORE: 22
ADLS_ACUITY_SCORE: 15
AMBULATION: 1-->ASSISTIVE EQUIPMENT
BATHING: 0-->INDEPENDENT
ADLS_ACUITY_SCORE: 15
ADLS_ACUITY_SCORE: 16
ADLS_ACUITY_SCORE: 17
ADLS_ACUITY_SCORE: 15
ADLS_ACUITY_SCORE: 10.5
ADLS_ACUITY_SCORE: 14
ADLS_ACUITY_SCORE: 19
ADLS_ACUITY_SCORE: 22
ADLS_ACUITY_SCORE: 17
ADLS_ACUITY_SCORE: 19
ADLS_ACUITY_SCORE: 15
ADLS_ACUITY_SCORE: 22
ADLS_ACUITY_SCORE: 13
ADLS_ACUITY_SCORE: 22
ADLS_ACUITY_SCORE: 15
ADLS_ACUITY_SCORE: 16
ADLS_ACUITY_SCORE: 19
RETIRED_COMMUNICATION: 0-->UNDERSTANDS/COMMUNICATES WITHOUT DIFFICULTY
ADLS_ACUITY_SCORE: 15
ADLS_ACUITY_SCORE: 22
ADLS_ACUITY_SCORE: 14
ADLS_ACUITY_SCORE: 13
AMBULATION: 1-->ASSISTIVE EQUIPMENT
TOILETING: 0-->INDEPENDENT
ADLS_ACUITY_SCORE: 15
ADLS_ACUITY_SCORE: 16
ADLS_ACUITY_SCORE: 17
COGNITION: 2 - DIFFICULTY WITH ORGANIZING THOUGHTS
ADLS_ACUITY_SCORE: 20
ADLS_ACUITY_SCORE: 19
ADLS_ACUITY_SCORE: 13
ADLS_ACUITY_SCORE: 14
ADLS_ACUITY_SCORE: 16
ADLS_ACUITY_SCORE: 16
ADLS_ACUITY_SCORE: 22
ADLS_ACUITY_SCORE: 17
ADLS_ACUITY_SCORE: 19
ADLS_ACUITY_SCORE: 17
ADLS_ACUITY_SCORE: 14
ADLS_ACUITY_SCORE: 20
ADLS_ACUITY_SCORE: 13
ADLS_ACUITY_SCORE: 14
TOILETING: 1-->ASSISTIVE EQUIPMENT
ADLS_ACUITY_SCORE: 20
SWALLOWING: 0-->SWALLOWS FOODS/LIQUIDS WITHOUT DIFFICULTY
ADLS_ACUITY_SCORE: 22
ADLS_ACUITY_SCORE: 19
ADLS_ACUITY_SCORE: 16
ADLS_ACUITY_SCORE: 19
ADLS_ACUITY_SCORE: 20
ADLS_ACUITY_SCORE: 20
ADLS_ACUITY_SCORE: 14
DRESS: 1-->ASSISTIVE EQUIPMENT
ADLS_ACUITY_SCORE: 15
ADLS_ACUITY_SCORE: 15
TRANSFERRING: 1-->ASSISTIVE EQUIPMENT
ADLS_ACUITY_SCORE: 16
ADLS_ACUITY_SCORE: 19
ADLS_ACUITY_SCORE: 19
ADLS_ACUITY_SCORE: 13
ADLS_ACUITY_SCORE: 19
ADLS_ACUITY_SCORE: 17
ADLS_ACUITY_SCORE: 20
ADLS_ACUITY_SCORE: 22
FALL_HISTORY_WITHIN_LAST_SIX_MONTHS: YES
ADLS_ACUITY_SCORE: 15
ADLS_ACUITY_SCORE: 19
ADLS_ACUITY_SCORE: 14
ADLS_ACUITY_SCORE: 10.5
ADLS_ACUITY_SCORE: 17
PREVIOUS_RESPONSIBILITIES: MEAL PREP;HOUSEKEEPING;LAUNDRY
ADLS_ACUITY_SCORE: 16
ADLS_ACUITY_SCORE: 15
ADLS_ACUITY_SCORE: 22
ADLS_ACUITY_SCORE: 22
ADLS_ACUITY_SCORE: 14
ADLS_ACUITY_SCORE: 13
ADLS_ACUITY_SCORE: 15
ADLS_ACUITY_SCORE: 19
WHICH_OF_THE_ABOVE_FUNCTIONAL_RISKS_HAD_A_RECENT_ONSET_OR_CHANGE?: AMBULATION;COGNITION;COMMUNICATION/SPEECH
ADLS_ACUITY_SCORE: 20
RETIRED_COMMUNICATION: 0-->UNDERSTANDS/COMMUNICATES WITHOUT DIFFICULTY
ADLS_ACUITY_SCORE: 15
ADLS_ACUITY_SCORE: 17
ADLS_ACUITY_SCORE: 14
ADLS_ACUITY_SCORE: 19
ADLS_ACUITY_SCORE: 19
ADLS_ACUITY_SCORE: 13
ADLS_ACUITY_SCORE: 19
BATHING: 0-->INDEPENDENT
ADLS_ACUITY_SCORE: 15
SWALLOWING: 0-->SWALLOWS FOODS/LIQUIDS WITHOUT DIFFICULTY
FALL_HISTORY_WITHIN_LAST_SIX_MONTHS: NO
TRANSFERRING: 0-->INDEPENDENT
ADLS_ACUITY_SCORE: 17
ADLS_ACUITY_SCORE: 22
ADLS_ACUITY_SCORE: 15
RETIRED_EATING: 0-->INDEPENDENT
ADLS_ACUITY_SCORE: 19
ADLS_ACUITY_SCORE: 13
ADLS_ACUITY_SCORE: 13
ADLS_ACUITY_SCORE: 20
ADLS_ACUITY_SCORE: 13
ADLS_ACUITY_SCORE: 13
ADLS_ACUITY_SCORE: 14
WHICH_OF_THE_ABOVE_FUNCTIONAL_RISKS_HAD_A_RECENT_ONSET_OR_CHANGE?: AMBULATION
ADLS_ACUITY_SCORE: 14
ADLS_ACUITY_SCORE: 13
ADLS_ACUITY_SCORE: 20
ADLS_ACUITY_SCORE: 13
ADLS_ACUITY_SCORE: 15
COGNITION: 0 - NO COGNITION ISSUES REPORTED
ADLS_ACUITY_SCORE: 22
ADLS_ACUITY_SCORE: 13
ADLS_ACUITY_SCORE: 19

## 2019-01-01 ASSESSMENT — ANXIETY QUESTIONNAIRES
3. WORRYING TOO MUCH ABOUT DIFFERENT THINGS: SEVERAL DAYS
3. WORRYING TOO MUCH ABOUT DIFFERENT THINGS: MORE THAN HALF THE DAYS
1. FEELING NERVOUS, ANXIOUS, OR ON EDGE: MORE THAN HALF THE DAYS
IF YOU CHECKED OFF ANY PROBLEMS ON THIS QUESTIONNAIRE, HOW DIFFICULT HAVE THESE PROBLEMS MADE IT FOR YOU TO DO YOUR WORK, TAKE CARE OF THINGS AT HOME, OR GET ALONG WITH OTHER PEOPLE: VERY DIFFICULT
5. BEING SO RESTLESS THAT IT IS HARD TO SIT STILL: MORE THAN HALF THE DAYS
GAD7 TOTAL SCORE: 5
GAD7 TOTAL SCORE: 5
7. FEELING AFRAID AS IF SOMETHING AWFUL MIGHT HAPPEN: NOT AT ALL
7. FEELING AFRAID AS IF SOMETHING AWFUL MIGHT HAPPEN: NEARLY EVERY DAY
2. NOT BEING ABLE TO STOP OR CONTROL WORRYING: NEARLY EVERY DAY
2. NOT BEING ABLE TO STOP OR CONTROL WORRYING: SEVERAL DAYS
6. BECOMING EASILY ANNOYED OR IRRITABLE: SEVERAL DAYS
1. FEELING NERVOUS, ANXIOUS, OR ON EDGE: SEVERAL DAYS
IF YOU CHECKED OFF ANY PROBLEMS ON THIS QUESTIONNAIRE, HOW DIFFICULT HAVE THESE PROBLEMS MADE IT FOR YOU TO DO YOUR WORK, TAKE CARE OF THINGS AT HOME, OR GET ALONG WITH OTHER PEOPLE: NOT DIFFICULT AT ALL
5. BEING SO RESTLESS THAT IT IS HARD TO SIT STILL: SEVERAL DAYS
GAD7 TOTAL SCORE: 18
3. WORRYING TOO MUCH ABOUT DIFFERENT THINGS: NEARLY EVERY DAY
6. BECOMING EASILY ANNOYED OR IRRITABLE: MORE THAN HALF THE DAYS
2. NOT BEING ABLE TO STOP OR CONTROL WORRYING: MORE THAN HALF THE DAYS
GAD7 TOTAL SCORE: 15
4. TROUBLE RELAXING: NOT AT ALL
6. BECOMING EASILY ANNOYED OR IRRITABLE: MORE THAN HALF THE DAYS
1. FEELING NERVOUS, ANXIOUS, OR ON EDGE: NEARLY EVERY DAY
GAD7 TOTAL SCORE: 18
GAD7 TOTAL SCORE: 15
7. FEELING AFRAID AS IF SOMETHING AWFUL MIGHT HAPPEN: MORE THAN HALF THE DAYS
5. BEING SO RESTLESS THAT IT IS HARD TO SIT STILL: MORE THAN HALF THE DAYS

## 2019-01-01 ASSESSMENT — ENCOUNTER SYMPTOMS
COUGH: 0
BACK PAIN: 1
VOMITING: 0
DIZZINESS: 0
UNEXPECTED WEIGHT CHANGE: 0
ABDOMINAL PAIN: 1
WEAKNESS: 1
BLOOD IN STOOL: 1
MYALGIAS: 1
CONFUSION: 1
RHINORRHEA: 0
DYSURIA: 0
PALPITATIONS: 0
CHEST TIGHTNESS: 0
ABDOMINAL PAIN: 0
CHILLS: 1
HEADACHES: 0
FACIAL SWELLING: 0
CONFUSION: 1
APPETITE CHANGE: 0
FEVER: 0
NUMBNESS: 1
SORE THROAT: 0
ABDOMINAL DISTENTION: 1
COLOR CHANGE: 1
DIARRHEA: 1
HEADACHES: 1
ARTHRALGIAS: 1
SHORTNESS OF BREATH: 1
FATIGUE: 0
FLANK PAIN: 0
FEVER: 0
BACK PAIN: 1
CONSTIPATION: 1
FREQUENCY: 0
NECK PAIN: 1
NAUSEA: 1
CONSTIPATION: 0
WEAKNESS: 1

## 2019-01-01 ASSESSMENT — MIFFLIN-ST. JEOR
SCORE: 1289.85
SCORE: 1373.31
SCORE: 1249.83
SCORE: 1287.13
SCORE: 1375.58
SCORE: 1373.31
SCORE: 1353.35
SCORE: 1307.54
SCORE: 1352
SCORE: 1281.68
SCORE: 1298.92
SCORE: 1369.68
SCORE: 1337.02
SCORE: 1355.62
SCORE: 1227.59

## 2019-01-01 ASSESSMENT — PATIENT HEALTH QUESTIONNAIRE - PHQ9
5. POOR APPETITE OR OVEREATING: MORE THAN HALF THE DAYS
SUM OF ALL RESPONSES TO PHQ QUESTIONS 1-9: 12
SUM OF ALL RESPONSES TO PHQ QUESTIONS 1-9: 1
SUM OF ALL RESPONSES TO PHQ QUESTIONS 1-9: 10
5. POOR APPETITE OR OVEREATING: NEARLY EVERY DAY

## 2019-01-01 ASSESSMENT — PAIN SCALES - GENERAL: PAINLEVEL: SEVERE PAIN (6)

## 2019-01-18 NOTE — TELEPHONE ENCOUNTER
Left message on answering machine for patient to call back.      RX Omeprazole should still have 90 day refill available---still with [facility].   RX Crestor should still have refills available until 11-4-19--still with [facility].      Can enter and forward RX Alprazolam request to covering provider after talking with patient.      Patient will need to schedule OV to est care with new PCP.     China HUGHES RN,BSN

## 2019-01-18 NOTE — TELEPHONE ENCOUNTER
Reason for Call:  Medication or medication refill:    Do you use a [facility]?  Name of the pharmacy and phone number for the current request:     [facility] - [place], [place] - [address] [place] [address]        Name of the medication requested: omeprazole (PRILOSEC) 20 MG CR capsule   rosuvastatin (CRESTOR) 40 MG tablet   ALPRAZolam (XANAX) 0.5 MG tablet       Other request: pt says the pharmacy will not fill or call the clinic to refill     Can we leave a detailed message on this number? YES    Phone number patient can be reached at: Cell number on file:    Telephone Information:   Mobile [phone]       Best Time: any    Call taken on 1/18/2019 at 4:50 PM by Suzanne Salinas

## 2019-01-19 NOTE — TELEPHONE ENCOUNTER
Discussed below w/ pt.     She has enough Crestor.    She will recheck omeprazole for refills    Still needs alprazolam. Explained this cannot be filled when clinic is closed. Will ask clinic to call her Mon 1/21 at [phone].   Pt voiced understanding and agreement. Amanda Negron RN/FNA

## 2019-01-19 NOTE — TELEPHONE ENCOUNTER
Pt returning call from clinic re: refills. See note added to 1/18 TE routed to CS Dr Parker's care team pool. Amanda Negron RN/FNA

## 2019-01-21 NOTE — TELEPHONE ENCOUNTER
Xanex last ordered 7/18/18  For 60 tabs and 2 refills.  Filled 7/18, 10/20, and 12/4 for 60 tabs each.  Pended 1x refill with note to establish care.  Last seen 10/10/18  Rhea Linares RN

## 2019-03-07 NOTE — TELEPHONE ENCOUNTER
Caller requested refill of Xanax;  Advised that she needs to establish  New PCP  per last RX instructions  States shecannot leave the house without it and cannot be seen in clinic tomorrow 03/06/18  Advised to call clinic in morning   Zamzam Saez RN  FNA

## 2019-03-07 NOTE — TELEPHONE ENCOUNTER
ALPRAZolam (XANAX) 0.5 MG tablet 60 tablet 0 1/21/2019  No   Sig - Route: Take 1 tablet (0.5 mg) by mouth 2 times daily as needed for anxiety Prescription to last one month - Oral   Class: Local Print   Notes to Pharmacy: Please inform pt to establish care with new provider for further refills   Order: [id number]   Printout Tracking     External Result Report   Medication Administration Instructions     Prescription to last one month   Pharmacy     [facility] [address] [place] [address] [address] [facility] [address]      Caller advised to call clinic tomorrow; request sent to  Provider team   Zamzam Saez RN  FNA    Reason for Disposition    Caller requesting a NON-URGENT new prescription or refill and triager unable to refill per unit policy    Protocols used: MEDICATION QUESTION CALL-ADULT-

## 2019-03-07 NOTE — TELEPHONE ENCOUNTER
See note below by Zamzam PAGE    ALPRAZolam (XANAX) 0.5 MG tablet 60 tablet 0 1/21/2019  No   Sig - Route: Take 1 tablet (0.5 mg) by mouth 2 times daily as needed for anxiety Prescription to last one month        Problem List Complete:  Yes is listed but no in depth notes      Panic disorder with agoraphobia, severe agoraphobic avoidance and mild panic attacks (Chronic)   Problem Detail     Priority:  Medium    Overview Signed 2/1/2017  3:09 PM by Azul Parker DO   stable on viibryd/xanax- failed paxil/ zoloft/ wellbutrin/seroquel/effexor/klonopin   Relevant Medications     VIIBRYD 40 MG TABS tablet   amitriptyline (ELAVIL) 25 MG tablet   ALPRAZolam (XANAX) 0.5 MG tablet   Last Encounter with Panic disorder with agoraphobia, severe agoraphobic avoidance and mild panic attacks     Visit Information      Provider Department Center   10/10/2018 Azul Parker DO [facility]         Last Written Prescription Date:  01/21/2019  Last Fill Quantity: 60,   # refills: 0    THE MOST RECENT OFFICE VISIT MUST BE WITHIN THE PAST 3 MONTHS. AT LEAST ONE FACE TO FACE VISIT MUST OCCUR EVERY 6 MONTHS. ADDITIONAL VISITS CAN BE VIRTUAL.  (THIS STATEMENT SHOULD BE DELETED.)    Last Office Visit with [facility] primary care provider: 10/2018    Future Office visit:     Controlled substance agreement:   Encounter-Level CSA - 08/22/2018:    Controlled Substance Agreement - Scan on 7/23/2018  1:16 PM: CONTROLLED SUBSTANCE AGREEMENT (below)       Encounter-Level CSA - 01/16/2017:    Controlled Substance Agreement - Scan on 1/24/2017 10:22 AM: CONTROLLED SUBSTANCE AGREEMENT (below)       Encounter-Level CSA - 08/07/2015:    Controlled Substance Agreement - Scan on 9/9/2015 12:32 PM: CONTROLLED SUBSTANCE AGREEMENT (below)       Patient-Level CSA:    There are no patient-level csa.         Last Urine Drug Screen: No results found for: CDAUT, No results found for: COMDAT, No results found for: THC13, PCP13, COC13, MAMP13, OPI13,  AMP13, BZO13, TCA13, MTD13, BAR13, OXY13, PPX13, BUP13     Processing:  Fax Rx to designated  pharmacy    [email or web address]   checked in past 3 months?  No, route to RN

## 2019-03-12 NOTE — TELEPHONE ENCOUNTER
"Pt called to follow up on medication request, I explained to the PT that as previous advised she needs to establish care with a new provider    Pt is refusing to schedule an appointment, I explained we cannot refill a medication without her being seen    Pt stated \" I do not want to see any of those doctors\"     Pt disconnected the call on me   "

## 2019-03-12 NOTE — TELEPHONE ENCOUNTER
Routing refill request to provider for review/approval because:    Drug not on the [facility] refill protocol   Pt has already been advised twice she needs to establish care with new PCP     Hortencia LATIF RN

## 2019-03-13 NOTE — TELEPHONE ENCOUNTER
Refilled. Please recommend again that she establish with new doctor at any clinic she chooses.  TANYA Dillard CNP

## 2019-03-13 NOTE — TELEPHONE ENCOUNTER
Alprazolam Rx faxed to [facility] [phone]    TC - see message below: patient needs to establish care with new provider ASAP either here or at another  Clinic.      RT Kai (R)

## 2019-03-13 NOTE — TELEPHONE ENCOUNTER
For charting purposes, I have a new clinical role with [facility] in geriatrics and not in primary care. Radha was aware of recommendation to establish with new PCP as a result.    Her history is quite complex with significant anxiety and panic - please reference my notes as needed for details. She has been on Xanax for years. Tough situation but ultimately, she will have to establish with a new primary provider and I suspect team would prefer she establish sooner rather than later to continue Rx medication for her.

## 2019-03-14 NOTE — TELEPHONE ENCOUNTER
Message left on Vm to call and schedule care with a new provider 3/14/2019 @131pm.      Antoinette WEEKS

## 2019-03-19 NOTE — TELEPHONE ENCOUNTER
"Clinic Action Needed: YES. Please follow up with Radha at [phone] or during her office visit with Dr. Parker tomorrow 8/22/18 at 11 am. Reports \"the last medication she gave me for anxiety, it is giving me the same symptoms of the one before, and I stopped taking that one too because it makes me really anxious and I don't know what to do now.\" Radha reporting losing many siblings to illnesses and is having a hard time with the stressors in her life right now.    FNA Triage Call  Presenting Problem: requesting to leave message for PCP    Guideline Used: medication questions - adult    Patient Recommendations/Teaching: follow up with PCP tomorrow.    Routed to: Dr. Parker's care team    Thank you,    Charleen Beckwith RN  [facility]    "
, What can we do for this pt? Pt is difficult to reach and has not called back.  
Left message to call back on voice mail using the number [phone]  
Left vm for pt.  Let her know Dr. Parker will discuss with her later today  Rhea Linares RN    
Letter sent to patient.   Roxie Interiano MA    
No answer. Did not leave another message.     Triage will leave in basket to try to follow up with patient again, but also routing to PCP as FYI unable to reach patient at this time     Hortencia LATIF RN        
Please send letter saying that we'd like her to call and reschedule in the next few weeks. Thanks!  
Please thank her for reaching out and let her know we'll discuss in clinic tomorrow. Thanks!  
Tried calling x 2 per Dr. Parker request. Pt no showed.  If pt calls in, please have speak to triage.    Triage to try back later today.  Rhea Linares RN    
Tried recalling patient, voicemail is full. Unable to leave message at number listed as home   Left message asking patient to call back at number listed as mobile    Hortencia LATIF RN    
 used

## 2019-03-25 NOTE — TELEPHONE ENCOUNTER
Pending Prescriptions:                       Disp   Refills    VIIBRYD 40 MG TABS tablet [Pharmacy Med N*90 tab*1            Sig: TAKE 1 TABLET DAILY    Last Written Prescription Date:  09/24/2018  Last Fill Quantity: 90,  # refills: 1   Last office visit: 10/10/2018 with prescribing provider:     Future Office Visit:      Routing refill request to provider for review/approval because:  Drug not on the [facility], P or [facility] refill protocol or controlled substance

## 2019-04-17 NOTE — TELEPHONE ENCOUNTER
"Patient is returning call to the clinic.  Patient had questions about what she needed to do.  FNA advised of message re: refill of Xanax.  Caller verbalizes understanding and says she will talk to her  about transportation to the clinic.      Additional Information    Negative: Drug overdose and nurse unable to answer question    Negative: Caller requesting information not related to medicine    Negative: Caller requesting a prescription for Strep throat and has a positive culture result    Negative: Rash while taking a medication or within 3 days of stopping it    Negative: Immunization reaction suspected    Negative: [1] Asthma and [2] having symptoms of asthma (cough, wheezing, etc)    Negative: MORE THAN A DOUBLE DOSE of a prescription or over-the-counter (OTC) drug    Negative: [1] DOUBLE DOSE (an extra dose or lesser amount) of over-the-counter (OTC) drug AND [2] any symptoms (e.g., dizziness, nausea, pain, sleepiness)    Negative: [1] DOUBLE DOSE (an extra dose or lesser amount) of prescription drug AND [2] any symptoms (e.g., dizziness, nausea, pain, sleepiness)    Negative: Took another person's prescription drug    Negative: [1] DOUBLE DOSE (an extra dose or lesser amount) of prescription drug AND [2] NO symptoms (Exception: a double dose of antibiotics)    Negative: Diabetes drug error or overdose (e.g., insulin or extra dose)    Negative: [1] Request for URGENT new prescription or refill of \"essential\" medication (i.e., likelihood of harm to patient if not taken) AND [2] triager unable to fill per unit policy    Negative: [1] Prescription not at pharmacy AND [2] was prescribed today by PCP    Negative: Pharmacy calling with prescription questions and triager unable to answer question    Negative: Caller has URGENT medication question about med that PCP prescribed and triager unable to answer question    Negative: Caller has medication question about med not prescribed by PCP and triager unable to " answer question (e.g., compatibility with other med, storage)    Negative: [1] DOUBLE DOSE (an extra dose or lesser amount) of over-the-counter (OTC) drug AND [2] NO symptoms (all triage questions negative)    Negative: [1] DOUBLE DOSE (an extra dose or lesser amount) of antibiotic drug AND [2] NO symptoms (all triage questions negative)    Caller has medication question only, adult not sick, and triager answers question    Protocols used: [facility]ST[facility]

## 2019-04-22 NOTE — PROGRESS NOTES
"  SUBJECTIVE:   Radha Turicos is a 58 year old female who presents to clinic today for the following   health issues:      Medication review   Radha is requesting refill of her xanax which she takes for horrific panic attacks \"that are way worse than any one else's :  Has palpitations and sweating.  Was originally seen by Dr Soriano.  Subsequently Dr Parker who weaned her from #90 monthly to #60 monthly.  She is not happy with that decision.    Last physical with PCP 10/2018: noet below by Dr Parker  LONG h/o panic attacks which also runs in the family  Was agoraphobic and couldn't leave the house for 6 years until she says she was Rx Alprazolam TID years ago  Did experiment with BID Alprazolam and did \"ok\" but prefers TID b/c if doesn't take it then she can't leave the house  Is lonesome during the day b/c \" is a workaholic\"  Says has only has 7 alcoholic drinks per week spread out; wine cooler or ld  She says propranolol (see notes) made anxiety worse and so did Atarax when we recently tried them as a substitute for one of her Alprazolam doses         She is apparently not aware that she was given an Rx on 4/19/19  Also has depression and is on Viibryd 40mg daily  Endorses 7 ETOH / week spread out   She refuses psychiatry \" I will never do that.  If I want to talk to somebody , I'll talk to Brant\"   She has been advised that she needs to establish with a new PCP in order to get additional refills and has not yet done that or determined where she wants to be seen in the future.  It has something to do with the fact that she and her sister see the same doctors and they have not yet discussed how to manage the transition    Additional history: as documented    Reviewed and updated as needed this visit by Provider         Patient Active Problem List   Diagnosis     Mixed hyperlipidemia     GERD (gastroesophageal reflux disease)     Anxiety     Insomnia     Controlled substance agreement signed       " Panic disorder with agoraphobia, severe agoraphobic avoidance and mild panic attacks     Tobacco abuse     Episode of recurrent major depressive disorder, unspecified depression episode severity (H)     Abnormal TSH     Past Surgical History:   Procedure Laterality Date     NO HISTORY OF SURGERY         Social History     Tobacco Use     Smoking status: Current Every Day Smoker     Packs/day: 1.00     Years: 23.00     Pack years: 23.00     Types: Cigarettes     Smokeless tobacco: Never Used   Substance Use Topics     Alcohol use: Yes     Alcohol/week: 0.0 oz     Comment: 4-5 drinks per week     Family History   Problem Relation Age of Onset     Lipids Mother      Heart Disease Mother      Alzheimer Disease Mother      Allergies Mother      C.A.D. Mother         had MI     Cancer Father 58        pancreatic or prostate?     Heart Disease Brother      C.A.D. Brother         2 brothers  of heart attacks/ one also had lung caner     Cancer Brother         2 brothers   of lung cancer     Cancer Sister         lung     Alcohol/Drug Sister          alcoholic cirrhosis     Depression Sister         anxiety disorder     Depression Brother         anxiety disorder     Colon Cancer Sister 51     Breast Cancer Sister          Current Outpatient Medications   Medication Sig Dispense Refill     ALPRAZolam (XANAX) 0.5 MG tablet Take 1 tablet (0.5 mg) by mouth 2 times daily as needed for anxiety Prescription to last one month 60 tablet 0     amitriptyline (ELAVIL) 25 MG tablet TAKE 1 TO 2 TABLETS AT BEDTIME 180 tablet 1     Cyanocobalamin (B-12) 1000 MCG TBCR Take 1,000 mcg by mouth daily 100 tablet 1     Omega-3 Fatty Acids (FISH OIL CONCENTRATE) 1000 MG CAPS        omeprazole (PRILOSEC) 20 MG DR capsule TAKE 1 CAPSULE DAILY 90 capsule 0     rosuvastatin (CRESTOR) 40 MG tablet Take 0.5 tablets (20 mg) by mouth daily 45 tablet 3     valACYclovir (VALTREX) 500 MG tablet TAKE 4 TABLETS ONCE, REPEAT IN 12 HOURS AS NEEDED 8  "tablet 2     VIIBRYD 40 MG TABS tablet TAKE 1 TABLET DAILY 90 tablet 0     Allergies   Allergen Reactions     Amoxicillin      Rash       ROS:  Constitutional, HEENT, cardiovascular, pulmonary, gi and gu systems are negative, except as otherwise noted.    OBJECTIVE:     /77 (BP Location: Right arm, Cuff Size: Adult Regular)   Pulse 117   Temp 98  F (36.7  C) (Oral)   Ht 1.632 m (5' 4.25\")   Wt 67.6 kg (149 lb)   LMP 09/14/2001   SpO2 96%   BMI 25.38 kg/m    Body mass index is 25.38 kg/m .   Here with her  today  GENERAL: smells of tobacco, alert and no distress  PSYCH: mentation appears normal, affect tearful , fails to meet examiners eyes  CYRUS 18/21  PHQ 9  10/27  Denies suicidal ideation    Diagnostic Test Results:  none     ASSESSMENT/PLAN:   (F40.01) Panic disorder with agoraphobia, severe agoraphobic avoidance and mild panic attacks  (primary encounter diagnosis)  Comment: her requested refill was sent to her pharm 4/19/19  Offered referral to mental health and she declined  Offered assistance making appt with new PCP and she declined  Advised that she needs to establish with PCP to obtain future rx refills          TANYA Bird [facility]        "

## 2019-04-24 NOTE — TELEPHONE ENCOUNTER
ALPRAZolam (XANAX) 0.5 MG tablet  Last Written Prescription Date:  4/19/19  Last Fill Quantity: 60,  # refills: 0   Last office visit: 4/22/2019 with prescribing provider:  Keith    Future Office Visit:        Requested Prescriptions   Pending Prescriptions Disp Refills     ALPRAZolam (XANAX) 0.5 MG tablet [Pharmacy Med Name: ALPRAZOLAM 0.5MG TABLETS] 60 tablet 0     Sig: TAKE 1 TABLET BY MOUTH TWICE DAILY AS NEEDED FOR ANXIETY FOR 30 DAYS       There is no refill protocol information for this order

## 2019-04-25 NOTE — TELEPHONE ENCOUNTER
"Last filled 3/13/19 per      Last Rx was printed 4/19/19 \"prescription to last 1 month\"     Rx refused. Duplicate/early request. Pharmacy notified.    Hortencia LATIF RN            "

## 2019-04-25 NOTE — TELEPHONE ENCOUNTER
Patient if we could re fax her Alprazolam from 4/19 to [facility] they  State they never received it, and then the Patient would like a call once this has  Been taking care of    Thank you

## 2019-04-26 NOTE — TELEPHONE ENCOUNTER
RX could not be located. Called to provide verbal on RX signed by DOD on 4/19 (Jerome). See Refill encounter from 4/16.     Ivonne CASTELLANO RN

## 2019-06-19 NOTE — NURSING NOTE
"Chief Complaint   Patient presents with     Depression     Anxiety     Establish Care     BP 91/62   Pulse 95   Temp 98.6  F (37  C) (Oral)   Ht 1.632 m (5' 4.25\")   Wt 65.9 kg (145 lb 3.2 oz)   LMP 09/14/2001   SpO2 97%   BMI 24.73 kg/m   Estimated body mass index is 24.73 kg/m  as calculated from the following:    Height as of this encounter: 1.632 m (5' 4.25\").    Weight as of this encounter: 65.9 kg (145 lb 3.2 oz).  Medication Reconciliation: complete      Health Maintenance that is potentially due pending provider review:  NONE    n/a    TONA José  "

## 2019-06-19 NOTE — PROGRESS NOTES
Subjective     Radha Turcios is a 59 year old female who presents to clinic today for the following health issues:  Pt presents to day with her sister who was seen prior to her but they requested to be in the same exam room.  Her  is also present.  HPI   Depression and Anxiety Follow-Up    How are you doing with your depression since your last visit? Worsened due to not having xanax prescription, unable to leave house without taking it     How are you doing with your anxiety since your last visit?  Worsened due to life events     Are you having other symptoms that might be associated with depression or anxiety? Yes:  can be very emotional     Have you had a significant life event? Grief or Loss and OTHER: sister diagnosed with stage 4 breast cancer      Do you have any concerns with your use of alcohol or other drugs? No     Taking Xanax - has not had any for 3 weeks  She is vague about the exact dates  Despite being on for years she states she had not trouble going off the medication.       Taking viibryd - for depression -   If missing gets irritable -   Started years ago  Was taking BID     Rx in the past for buspar, paxil, wellbutrin and zoloft - had side effects from all of the medication.      Cholesterol - elevated level -  Was taking Crestor - stopped at last time  Was on tricor in the past -     GERD - taking omeprazole daily   Taking B12 daily -       Social History     Tobacco Use     Smoking status: Current Every Day Smoker     Packs/day: 1.00     Years: 23.00     Pack years: 23.00     Types: Cigarettes     Smokeless tobacco: Never Used   Substance Use Topics     Alcohol use: Yes     Alcohol/week: 0.0 oz     Comment: 4-5 drinks per week     Drug use: No     PHQ 7/18/2018 4/22/2019 6/19/2019   PHQ-9 Total Score 5 10 12   Q9: Thoughts of better off dead/self-harm past 2 weeks Not at all Not at all Not at all     CYRUS-7 SCORE 7/18/2018 4/22/2019 6/19/2019   Total Score - - -   Total Score 14 18 15        In the past two weeks have you had thoughts of suicide or self-harm?  No.    Do you have concerns about your personal safety or the safety of others?   No    Suicide Assessment Five-step Evaluation and Treatment (SAFE-T)    Amount of exercise or physical activity: None    Problems taking medications regularly: No    Medication side effects: none    Diet: regular (no restrictions)      -------------------------------------    Patient Active Problem List   Diagnosis     Mixed hyperlipidemia     GERD (gastroesophageal reflux disease)     Anxiety     Insomnia     Controlled substance agreement signed       Panic disorder with agoraphobia, severe agoraphobic avoidance and mild panic attacks     Tobacco abuse     Episode of recurrent major depressive disorder, unspecified depression episode severity (H)     Abnormal TSH     Past Surgical History:   Procedure Laterality Date     NO HISTORY OF SURGERY         Social History     Tobacco Use     Smoking status: Current Every Day Smoker     Packs/day: 1.00     Years: 23.00     Pack years: 23.00     Types: Cigarettes     Smokeless tobacco: Never Used   Substance Use Topics     Alcohol use: Yes     Alcohol/week: 0.0 oz     Comment: 4-5 drinks per week     Family History   Problem Relation Age of Onset     Lipids Mother      Heart Disease Mother      Alzheimer Disease Mother      Allergies Mother      C.A.D. Mother         had MI     Cancer Father 58        pancreatic or prostate?     Heart Disease Brother      C.A.D. Brother         2 brothers  of heart attacks/ one also had lung caner     Cancer Brother         2 brothers   of lung cancer     Cancer Sister         lung     Alcohol/Drug Sister          alcoholic cirrhosis     Depression Sister         anxiety disorder     Depression Brother         anxiety disorder     Colon Cancer Sister 51     Breast Cancer Sister            -------------------------------------  Reviewed and updated as needed this visit by  "Provider  Tobacco  Allergies  Meds  Problems  Med Hx  Surg Hx  Fam Hx         Review of Systems   ROS COMP: Constitutional, HEENT, cardiovascular, pulmonary, GI, , musculoskeletal, neuro, skin, endocrine and psych systems are negative, except as otherwise noted.      Objective    BP 91/62   Pulse 95   Temp 98.6  F (37  C) (Oral)   Ht 1.632 m (5' 4.25\")   Wt 65.9 kg (145 lb 3.2 oz)   LMP 09/14/2001   SpO2 97%   BMI 24.73 kg/m    Body mass index is 24.73 kg/m .  Physical Exam   GENERAL: alert, no distress and tearful at times discussing her Xanax and anxiety related to cancer and family hx   NECK: no adenopathy, no asymmetry, masses, or scars and thyroid normal to palpation  RESP: lungs clear to auscultation - no rales, rhonchi or wheezes  CV: regular rate and rhythm, normal S1 S2, no S3 or S4, no murmur, click or rub, no peripheral edema and peripheral pulses strong    Diagnostic Test Results:  Labs reviewed in Epic  Results for orders placed or performed in visit on 06/19/19 (from the past 24 hour(s))   Lipid panel reflex to direct LDL Fasting   Result Value Ref Range    Cholesterol 81 <200 mg/dL    Triglycerides 75 <150 mg/dL    HDL Cholesterol 30 (L) >49 mg/dL    LDL Cholesterol Calculated 36 <100 mg/dL    Non HDL Cholesterol 51 <130 mg/dL   Comprehensive metabolic panel (BMP + Alb, Alk Phos, ALT, AST, Total. Bili, TP)   Result Value Ref Range    Sodium 141 133 - 144 mmol/L    Potassium 4.2 3.4 - 5.3 mmol/L    Chloride 105 94 - 109 mmol/L    Carbon Dioxide 23 20 - 32 mmol/L    Anion Gap 13 3 - 14 mmol/L    Glucose 79 70 - 99 mg/dL    Urea Nitrogen 8 7 - 30 mg/dL    Creatinine 0.54 0.52 - 1.04 mg/dL    GFR Estimate >90 >60 mL/min/[1.73_m2]    GFR Estimate If Black >90 >60 mL/min/[1.73_m2]    Calcium 8.6 8.5 - 10.1 mg/dL    Bilirubin Total 1.0 0.2 - 1.3 mg/dL    Albumin 2.9 (L) 3.4 - 5.0 g/dL    Protein Total 8.0 6.8 - 8.8 g/dL    Alkaline Phosphatase 170 (H) 40 - 150 U/L    ALT 41 0 - 50 U/L    AST " 137 (H) 0 - 45 U/L   CBC with platelets   Result Value Ref Range    WBC 6.2 4.0 - 11.0 10e9/L    RBC Count 3.70 (L) 3.8 - 5.2 10e12/L    Hemoglobin 13.6 11.7 - 15.7 g/dL    Hematocrit 40.2 35.0 - 47.0 %     (H) 78 - 100 fl    MCH 36.8 (H) 26.5 - 33.0 pg    MCHC 33.8 31.5 - 36.5 g/dL    RDW 17.4 (H) 10.0 - 15.0 %    Platelet Count 127 (L) 150 - 450 10e9/L   TSH with free T4 reflex   Result Value Ref Range    TSH 4.58 (H) 0.40 - 4.00 mU/L   T4 free   Result Value Ref Range    T4 Free 1.26 0.76 - 1.46 ng/dL           Assessment & Plan     1. Anxiety  Long hx of anxiety - she has been on xanax for years.  Discussed risk vs benefits of the medication and potential long term effects.  Discussed need to find alternative in her 60's due to risk of falls and fractures  Pt states she wants the xanax - was taking two tablets a day -   Wrote for 60 tablets monthly  She signed her Controlled Substance agreement form  She was sent to lab for tests but was wanting to skip the UDT - she was given water to drink and able to eventually leave a sample.  F/u 3 months  - Drug  Screen Comprehensive , Urine with Reported Meds (MedTox) (Pain Care Package)  - Comprehensive metabolic panel (BMP + Alb, Alk Phos, ALT, AST, Total. Bili, TP)  - CBC with platelets  - amitriptyline (ELAVIL) 25 MG tablet; TAKE 1 TO 2 TABLETS AT BEDTIME  Dispense: 180 tablet; Refill: 1  - vilazodone (VIIBRYD) 40 MG TABS tablet; Take 1 tablet (40 mg) by mouth daily  Dispense: 90 tablet; Refill: 0  - ALPRAZolam (XANAX) 0.5 MG tablet; Take 1 tablet (0.5 mg) by mouth 2 times daily as needed for anxiety Prescription to last one month  Dispense: 60 tablet; Refill: 2  - T4 free  - T4 free    2. Anxiety   as above  - Drug  Screen Comprehensive , Urine with Reported Meds (MedTox) (Pain Care Package)  - Comprehensive metabolic panel (BMP + Alb, Alk Phos, ALT, AST, Total. Bili, TP)  - CBC with platelets  - amitriptyline (ELAVIL) 25 MG tablet; TAKE 1 TO 2 TABLETS AT  BEDTIME  Dispense: 180 tablet; Refill: 1  - vilazodone (VIIBRYD) 40 MG TABS tablet; Take 1 tablet (40 mg) by mouth daily  Dispense: 90 tablet; Refill: 0  - ALPRAZolam (XANAX) 0.5 MG tablet; Take 1 tablet (0.5 mg) by mouth 2 times daily as needed for anxiety Prescription to last one month  Dispense: 60 tablet; Refill: 2  - T4 free  - T4 free    3. Episode of recurrent major depressive disorder, unspecified depression episode severity (H)  Refilled -   - Comprehensive metabolic panel (BMP + Alb, Alk Phos, ALT, AST, Total. Bili, TP)  - amitriptyline (ELAVIL) 25 MG tablet; TAKE 1 TO 2 TABLETS AT BEDTIME  Dispense: 180 tablet; Refill: 1  - vilazodone (VIIBRYD) 40 MG TABS tablet; Take 1 tablet (40 mg) by mouth daily  Dispense: 90 tablet; Refill: 0    4. Gastroesophageal reflux disease without esophagitis  Refilled   - Comprehensive metabolic panel (BMP + Alb, Alk Phos, ALT, AST, Total. Bili, TP)  - CBC with platelets  - omeprazole (PRILOSEC) 20 MG DR capsule; Take 1 capsule (20 mg) by mouth daily  Dispense: 90 capsule; Refill: 1    5. Cold sore     - valACYclovir (VALTREX) 500 MG tablet; TAKE 4 TABLETS ONCE, REPEAT IN 12 HOURS AS NEEDED  Dispense: 8 tablet; Refill: 2    6. Mixed hyperlipidemia  Will hold off on Crestor -   Plan to check lipids today   - Lipid panel reflex to direct LDL Fasting  - Comprehensive metabolic panel (BMP + Alb, Alk Phos, ALT, AST, Total. Bili, TP)  - CBC with platelets    7. Abnormal TSH  Pending labs -   - TSH with free T4 reflex     Tobacco Cessation:   reports that she has been smoking cigarettes.  She has a 23.00 pack-year smoking history. She has never used smokeless tobacco.  Tobacco Cessation Action Plan: not addressed today        Pt will call or RTC if symptoms worsen or do not improve.      Return in about 3 months (around 9/19/2019) for anxiety.    Concepcion Mcintyre, [facility]

## 2019-06-19 NOTE — LETTER
[facility]  06/19/19    Patient: Radha Turcios  YOB: 1960  Medical Record Number: [id number]  CSN: [id number]                                                                              Non-opioid Controlled Substance Agreement    I understand that my care provider has prescribed a controlled substance to help manage my condition(s). I am taking this medicine to help me function or work. I know this is strong medicine, and that it can cause serious side effects. Controlled substances can be sedating, addicting and may cause a dependency on the drug. They can affect my ability to drive or think, and cause depression. They need to be taken exactly as prescribed. Combining controlled substances with certain medicines or chemicals (such as cocaine, sedatives and tranquilizers, sleeping pills, meth) can be dangerous or even fatal. Also, if I stop controlled substances suddenly, I may have severe withdrawal symptoms.  If not helpful, I may be asked to stop them.    The risks, benefits, and side effects of these medicine(s) were explained to me. I agree that:    1. I will take part in other treatments as advised by my care team. This may be psychiatry or counseling, physical therapy, behavioral therapy, group treatment or a referral to a pain clinic. I will reduce or stop my medicine when my care team tells me to do so.  2. I will take my medicines as prescribed. I will not change the dose or schedule unless my care team tells me to. There will be no refills if I  run out early.   I may be contactedwithout warning and asked to complete a urine drug test or pill count at any time.   3. I will keep all my appointments, and understand this is part of the monitoring of controlled substances. My care team may require an office visit for EVERY controlled substance refill. If I miss appointments or don t follow instructions, my care team may stop my medicine.  4. I will not ask other providers to  prescribe controlled substances, and I will not accept controlled substances from other people. If I need another prescribed controlled substance for a new reason, I will tell my care team within 1 business day.  5. I will use one pharmacy to fill all of my controlled substance prescriptions, and it is up to me to make sure that I do not run out of my medicines on weekends or holidays. If my care team is willing to refill my controlled substance prescription without a visit, I must request refills only during office hours, refills may take up to 3 days to process, and it may take up to 5 to 7 days for my medicine to be mailed and ready at my pharmacy. Prescriptions will not be mailed anywhere except my pharmacy.    6. I am responsible for my prescriptions. If the medicine/prescription is lost or stolen, it will not be replaced. I also agree not to share controlled substance medicines with anyone.              [facility]  06/19/19  Patient:  Radha Turcios  YOB: 1960  Medical Record Number: [id number]  CSN: [id number]    7. I agree to not use ANY illegal or recreational drugs. This includes marijuana, cocaine, bath salts or other drugs. I agree not to use alcohol unless my care team says I may. I agree to give urine samples whenever asked. If I don t give a urine sample, the care team may stop my medicine.    8. If I enroll in the [place] Medical Marijuana program, I will tell my care team. I will also sign an agreement to share my medical records with my care team.    9. I will bring in my list of medicines (or my medicine bottles) each time I come to the clinic.   10. I will tell my care team right away if I become pregnant or have a new medical problem treated outside of my regular clinic.  11. I understand that this medicine can affect my thinking and judgment. It may be unsafe for me to drive, use machinery and do dangerous tasks. I will not do any of these things until I know how the  medicine affects me. If my dose changes, I will wait to see how it affects me. I will contact my care team if I have concerns about medicine side effects.    I understand that if I do not follow any of the conditions above, my prescriptions or treatment may be stopped.      I agree that my provider, clinic care team, and pharmacy may work with any city, state or federal law enforcement agency that investigates the misuse, sale, or other diversion of my controlled medicine. I will allow my provider to discuss my care with or share a copy of this agreement with any other treating provider, pharmacy or emergency room where I receive care. I agree to give up (waive) any right of privacy or confidentiality with respect to these consents.   I have read this agreement and have asked questions about anything I did not understand.    ____________________________________________________    ____________  ________  Patient signature                                                         Date      Time    ____________________________________________________     ____________  ________  Witness                                                          Date      Time    ____________________________________________________  Provider signature

## 2019-06-28 NOTE — RESULT ENCOUNTER NOTE
Please send copy of results with a letter:    Enclosed is a copy of your results. If you have any questions, please contact us at [phone].      -Normal red blood cell (hgb) levels, normal white blood cell count and low platelet levels.  ADVISE: plan to recheck in 1-2 months  -Cholesterol levels (LDL,HDL, Triglycerides) are normal.  ADVISE: rechecking in 1 year.   -Liver and gallbladder tests (ALT,AST, Alk phos,bilirubin) AST and alk phosphatase are elevated.  This goes up from alcohol use.  Please stop any alcohol and plan to recheck in 1-2 months  -Kidney function (GFR) is normal.  -Sodium is normal.  -Potassium is normal.  -Calcium is normal.  -Glucose (diabetic screening test) is normal.  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  Plan to recheck in 6 months.  Your urine drug test shows the xanax and it also shows some alcohol and THC.  We can discuss at your next visit.    Thanks,   Concepcion Mcintyre, DO

## 2019-09-06 NOTE — TELEPHONE ENCOUNTER
Is she switching to mail order?  Has refill left at local [facility]  Left message for patient to callback.  Natalie LOERA RN

## 2019-09-06 NOTE — TELEPHONE ENCOUNTER
"Requested Prescriptions   Pending Prescriptions Disp Refills     amitriptyline (ELAVIL) 25 MG tablet [Pharmacy Med Name: AMITRIPTYLINE TABS 25MG] 180 tablet 4     Sig: TAKE 1 TO 2 TABLETS AT BEDTIME   Last Written Prescription Date:  6/19/2019  Last Fill Quantity: 180,  # refills: 1   Last office visit: 6/19/2019 with prescribing provider:  Francisco Javier  Future Office Visit:        Tricyclic Agents ( Annual appt and no PHQ9) Passed - 9/5/2019 11:53 PM        Passed - Blood Pressure under 140/90 in past 12 mos     BP Readings from Last 3 Encounters:   06/19/19 91/62   04/22/19 116/77   10/10/18 133/87                 Passed - Recent (12 mo) or future (30 days) visit within authorizing provider's specialty     Patient had office visit in the last 12 months or has a visit in the next 30 days with authorizing provider or within the authorizing provider's specialty.  See \"Patient Info\" tab in inbasket, or \"Choose Columns\" in Meds & Orders section of the refill encounter.              Passed - Medication is active on med list        Passed - Patient is age 18 or older        Passed - Patient is not pregnant        Passed - No positive pregnancy test on record in past 12 mos        Routing to UP refills.    TAO PascualN, RN  [facility] [facility]    "

## 2019-09-09 NOTE — TELEPHONE ENCOUNTER
Left VM #2 for pt  No recent Rx's to mail order  Unable to confirm if pt switching pharmacies  Denied refill for now  Natalie LOERA RN

## 2019-09-21 NOTE — TELEPHONE ENCOUNTER
"Requested Prescriptions   Pending Prescriptions Disp Refills     omeprazole (PRILOSEC) 20 MG DR capsule  Last Written Prescription Date:  06/19/2019  Last Fill Quantity: 90 capsule,  # refills: 1   Last Office Visit: 6/19/2019   Future Office Visit:    90 capsule 1     Sig: Take 1 capsule (20 mg) by mouth daily       PPI Protocol Passed - 9/20/2019  6:25 PM        Passed - Not on Clopidogrel (unless Pantoprazole ordered)        Passed - No diagnosis of osteoporosis on record        Passed - Recent (12 mo) or future (30 days) visit within the authorizing provider's specialty     Patient had office visit in the last 12 months or has a visit in the next 30 days with authorizing provider or within the authorizing provider's specialty.  See \"Patient Info\" tab in inbasket, or \"Choose Columns\" in Meds & Orders section of the refill encounter.          Passed - Medication is active on med list        Passed - Patient is age 18 or older        Passed - No active pregnacy on record        Passed - No positive pregnancy test in past 12 months            "

## 2019-09-23 NOTE — TELEPHONE ENCOUNTER
Left message for patient to callback.  Last Rx to local [facility] - should have refill left  Mail order pended though   Pt changing pharmacies?  Natalie LOERA RN

## 2019-09-24 NOTE — TELEPHONE ENCOUNTER
Tried to call pt again  VM now full   Will await pt's callback before sending Rx to new pharmacy  Natalie LOERA RN

## 2019-09-24 NOTE — TELEPHONE ENCOUNTER
Second request from [facility] for Omeprazole    Patient is changing to mail order, new Rx is needed for medication.    Maritza Irizarry RT (R)

## 2019-10-06 NOTE — ED PROVIDER NOTES
History     Chief Complaint:  Altered Mental Status    The history is provided by the EMS personnel and the spouse.      Radha Turcios is a 59 year old female with a history of alcohol abuse and who is one months sober who presents to the emergency department today via EMS for evaluation of an altered mental status. The patient's  called EMS today as the patient has become increasingly confused and weak over the past three days. She also has increasing abdominal distention, and was unable to get out of bed at all yesterday. She also has not had a bowel movement in ten days per her , and has not been urinating much recently- usually only two or three times a day and nothing today. Her  reports she stopped drinking alcohol on 09/14/2019, and that she has been having these types of symptoms on and off since then but that they have been significantly worse in the past three days. He has not seen any jaundice in her eyes and she has had no fevers. Her  says she had something askew with her liver enzymes this summer at her check up, but that she would not disclose much detail to him.     Allergies:  Amoxicillin    Medications:    Xanax  Amitriptyline  Omeprazole  Valtrex  Vilazodone    Past Medical History:    Anxiety  Depression  Hyperlipidemia  GERD  Elevated blood protein  Insomnia  Macrocytosis without anemia  Panic disorder  Recurrent cold sores  Abnormal pap smear  Alcohol abuse, h/o    Past Surgical History:    Surgical history reviewed. No pertinent surgical history.    Family History:    Heart disease- Mother  Lipids  Alzheimer disease  Myocardia Infarction- Father  Depression  Anxiety  Cancer- breast, colon, lung  Alcohol and drug abuse    Social History:  The patient was accompanied to the ED by her .  Smoking Status: Positive, 1 PPD  Smokeless Tobacco: Never Used  Alcohol Use: 1 month sober, history of abuse  Drug Use: Negative    Marital Status:      Review of  Systems   Constitutional: Negative for fever.   Eyes:        No jaundice   Gastrointestinal: Positive for abdominal distention and constipation.   Genitourinary: Positive for decreased urine volume.   Neurological: Positive for weakness.   Psychiatric/Behavioral: Positive for confusion.   All other systems reviewed and are negative.        Physical Exam     Patient Vitals for the past 24 hrs:   BP Temp Temp src Pulse Heart Rate Resp SpO2   10/06/19 2145 99/53 98.1  F (36.7  C) -- 97 95 27 93 %   10/06/19 2115 122/67 97.9  F (36.6  C) -- 101 98 27 97 %   10/06/19 2059 (!) 89/54 96.1  F (35.6  C) -- 98 99 23 98 %   10/06/19 2000 91/55 -- -- 99 -- 26 98 %   10/06/19 1920 -- -- -- 100 100 25 99 %   10/06/19 1910 -- -- -- 98 100 17 98 %   10/06/19 1850 90/53 -- -- 98 -- -- --   10/06/19 1846 98/50 97  F (36.1  C) Temporal -- 97 18 99 %      Physical Exam    Constitutional: Staring blankly, eyes open. Overall cachectic appearing and dusky.  Head: Atraumatic.  Mouth/Throat: Oropharynx is clear and moist. No oropharyngeal exudate. Lips chapped and dry, appears dehydrated.   Eyes: Conjunctivae and EOM are normal. No scleral icterus.  Neck: Normal range of motion. Neck supple.   Cardiovascular: Normal rate, regular rhythm, normal heart sounds and intact distal pulses.   Pulmonary/Chest: Breath sounds normal. No respiratory distress.  Abdominal: Soft. Bowel sounds are normal. No tenderness. Distended abdomen without rebound or guarding.   Musculoskeletal: Normal range of motion. Bilateral lower extremity edema, non-pitting.  Neurological: No observable focal neuro deficit. Unable to really follow commands well.   Skin: Warm and dry. No rash noted. Not diaphoretic. Not frankly jaundice.    Emergency Department Course     ECG:  ECG taken at 1854, ECG read at 1855  Sinus rhythm with premature atrial complexes  Cannot rule out anterior infarct, age undetermined  Abnormal ECG   Rate 98 bpm. NY interval 150. QRS duration 82. QT/QTc  356/454. P-R-T axes 66 0 54.     Imaging:  Radiology findings were communicated with the patient and family who voiced understanding of the findings.  XR, Chest Port, 1 View  Shallow inspiration. Patient is rotated to the left. Displaced fracture distal left clavicle. Normal heart size. Lungs clear.   Reading per radiology    CT, Abdomen and Pelvis, without contrast, stone protocol  1. Large amount of ascites and mesenteric edema are likely secondary to liver failure, given the patient's history. The liver is small which could be secondary to cirrhosis. No evidence for splenomegaly, however, to suggest portal venous hypertension.  2. Gallbladder is filled with radiopaque material which could represent gallstones.  3. Low-attenuation thickening of the wall of the cecum and ascending colon could represent chronic inflammatory change. Appendix is not well-seen on this study.  4. Colonic diverticulosis without evidence for acute diverticulitis.  5. Fibroid uterus.  6. Evaluation of solid organs of the abdomen and pelvis is significantly limited by lack of IV contrast. Evaluation of the hollow organs is limited by lack of oral contrast.  Reading per radiology    CT, Head without Contrast  1.  No intracranial hemorrhage, mass lesions, hydrocephalus or CT evidence for an acute infarct.  Reading per radiology    Laboratory:  Laboratory findings were communicated with the patient and family who voiced understanding of the findings.  CBC: WNL (WBC 15.3, HGB 7.9, )   CMP: Potassium 2.8 (L), Urea nitrogen 48 (H), GFR estimate 11 (L), Calcium 7.9 (L), Bilirubin total 2.3 (H), Albumin 1.8 (L), AST 60 (H). O/w WNL (Creatinine 4.26)  Lactic Acid: 4.4 (H)   TSH with free T4 reflex: 2.20   Alcohol ethyl: <0.01   INR: 2.05 (H)   Ammonia: 114 (H)  ISTAT gases lactate patsy POCT: pH Venous 7.46 (H), PCO2 Venous 32 (L), PO2 Venous 23 (L) , Bicarbonate Venous 23, O2 Sat Venous 45, Lactic Acid 4.2 (H)  ISTAT BMP POCT: Potassium 2.8  (L), Chloride 92 (L), Anion Gap 19 (H), Urea nitrogen 43 (H), GFR estimate 9 (L), Calcium ionized 3.8 (L), Hemoglobin 8.2 (L), Hematocrit 24 (L). O/w WNL (Creatinine 4.9)   UA with Microscopic: Cloudy, dark yellow urine with a small amount of bilirubin, trace of fketones, small blood in the urine, protein albumin of 100, urobilinogen of 3.0 (H), WBC of 12 (H), RBC of 4 (H), few bacteria, Squamous Epithelial of 10 (H), mucous present and hyaline casts of 4 (H). O/w WNL.    Albumin fluid: 0.2 (peritoneal fluid)  Protein fluid: 0.9 (preitoneal fluid)  Albumin level: Pending  Cell count with differential fluid: Pending  Urine Culture Aerobic Bacterial: Pending  Blood Cultures x2: Pending    Procedures:   Procedure: Diagnostic Paracentesis       INDICATION: Altered mental status and hepatic failure     PROCEDURE : Dr. Larry     CONSENT: Emergent procedure     PROCEDURE SUMMARY:  A time-out was performed. The area of the left abdomen was prepped and draped in a sterile fashion using chlorhexidine scrub. 1% lidocaine 5 mL was used to numb the region. Diagnostic done with 18 gauge needle. No blood was aspirated. Clear yellow fluid was retrieved and collected. Approximately 10 mL of ascitic fluid was collected and sent for laboratory analysis. The patient tolerated the procedure well without any immediate complications.      ESTIMATED BLOOD LOSS: None     COMPLICATIONS: None    Interventions:  1856 0.9% NaCl 2L IV  1907 Potassium Chloride 10 mEq IV   1907 0.9% NaCl 1L with Infuvite adult 10 mL IV   1952 Lorazepam 0.5 mg IV  2032 Ceftriaxone 1 g IV  2117 Lactulose 20 g    Emergency Department Course:  1841 Nursing notes and vitals reviewed.  1842 I performed an exam of the patient as documented above.   1854 IV was inserted and blood was drawn for laboratory testing, results above.   1943 The patient was sent for an XR and CTs while in the emergency department, results above.    2014 Patient was rechecked and  updated.  9538 I spoke with Dr. Lee of the Hospitalist service from [facility] regarding patient's presentation, findings, and plan of care.       Findings and plan explained to the Patient and spouse. Patient will be transferred to [facility] via EMS. Discussed the case with Dr. Lee, who will admit the patient to a monitored bed for further monitoring, evaluation, and treatment.     I personally reviewed the laboratory and imaging results with the Patient and spouse and answered all related questions prior to transfer.     Impression & Plan    CC time 30min.    Medical Decision Making:  Severely ill with likely primary hepatic failure alcohol with now multisystem sequale.  Needs admit to high level setting for further workup and mgmt.      Diagnosis:    ICD-10-CM    1. Hepatic encephalopathy (H) K72.90 UA with Microscopic     Urine Culture     Blood culture     Blood culture     Cell count with differential fluid     Albumin fluid     Protein fluid     CANCELED: Albumin level   2. Acute renal failure, unspecified acute renal failure type (H) N17.9    3. Anemia, unspecified type D64.9    4. Displaced fracture of lateral end of left clavicle, initial encounter for closed fracture S42.032A    5. Dehydration E86.0        Disposition:  The patient was transferred to [facility] and into the care of Dr. Lee.     Scribe Disclosure:  I, Jennifer Jackson, am serving as a scribe at 6:43 PM on 10/6/2019 to document services personally performed by Addison Larry MD based on my observations and the provider's statements to me.    10/6/2019   [facility] EMERGENCY DEPARTMENT       Addison Larry MD  10/06/19 [id number]

## 2019-10-06 NOTE — ED TRIAGE NOTES
Patient brought in by EMS -  called 911 patient has had increased weakness, confusion and distended abdomen x 3 days. Patient lives with  and he states she has not had BM for 10 days. Patient has history of alcohol use- last drink sept 14 th.   EMS checked glucose was 113 and started IV # 20 in left hand. BP 89/45 per EMS on arrival and patient was unable to walk.

## 2019-10-06 NOTE — ED NOTES
Bed: ED03  Expected date: 10/6/19  Expected time: 6:31 PM  Means of arrival: Ambulance  Comments:  BSV 2- confused

## 2019-10-07 PROBLEM — K70.30 ALCOHOLIC CIRRHOSIS OF LIVER WITHOUT ASCITES (H): Status: ACTIVE | Noted: 2019-01-01

## 2019-10-07 PROBLEM — E87.6 HYPOKALEMIA: Status: ACTIVE | Noted: 2019-01-01

## 2019-10-07 PROBLEM — D68.9 COAGULOPATHY (H): Status: ACTIVE | Noted: 2019-01-01

## 2019-10-07 PROBLEM — K70.30 ALCOHOLIC CIRRHOSIS OF LIVER WITHOUT ASCITES (H): Status: RESOLVED | Noted: 2019-01-01 | Resolved: 2019-01-01

## 2019-10-07 PROBLEM — D64.9 ANEMIA: Status: ACTIVE | Noted: 2019-01-01

## 2019-10-07 PROBLEM — K76.82 HEPATIC ENCEPHALOPATHY (H): Status: ACTIVE | Noted: 2019-01-01

## 2019-10-07 PROBLEM — K70.31 ALCOHOLIC CIRRHOSIS OF LIVER WITH ASCITES (H): Status: ACTIVE | Noted: 2019-01-01

## 2019-10-07 PROBLEM — D69.6 THROMBOCYTOPENIA (H): Status: ACTIVE | Noted: 2019-01-01

## 2019-10-07 PROBLEM — E87.20 LACTIC ACIDOSIS: Status: ACTIVE | Noted: 2019-01-01

## 2019-10-07 PROBLEM — E83.42 HYPOMAGNESEMIA: Status: ACTIVE | Noted: 2019-01-01

## 2019-10-07 PROBLEM — N17.9 ARF (ACUTE RENAL FAILURE) (H): Status: ACTIVE | Noted: 2019-01-01

## 2019-10-07 PROBLEM — S42.035A CLOSED NONDISPLACED FRACTURE OF ACROMIAL END OF LEFT CLAVICLE: Status: ACTIVE | Noted: 2019-01-01

## 2019-10-07 NOTE — PROGRESS NOTES
Pt to Xray for clavicle and abd images. Prior to test pt was on 2 L NC satting 98%.  Upon laying flat for test pt required 10L to keep sats >90%. Pt was grunting and more tachypneic than baseline, appears to be in pain. When back to room and settled the 02 was weaned down to 5 L, RR in upper 20's to 30's as was baseline prior to test. Grunting has stopped, appears comfortable.

## 2019-10-07 NOTE — PLAN OF CARE
VSS, BP remains soft. Pt remains obtunded, does not respond to speech but withdraws some from touch. Some attempts at speech-unintelligible. Turn/repo q 2, pt does not stay truned despite pillows and sitter at bedside. Pt constantly moves her hips/legs, occasionally pulls at tubes/lines. Rectal tube was placed prior to lactulose administration- patient removed it herself shortly after placed. Stool is blackish brown, partly loose with several firm, formed portions. May re-attempt rectal tube when no longer has firm stools. Feeding tube placed-using for medications while pt is obtunded. Xray confirmed placement, nasal bridal in use. Per Dr Harper keep head of bed >20* to prevent reflux. L clavicle is fractured-using a sling, no plans for surgery. Many falls PTA, small abrasion/rash on coccyx-using antifungal powder. Many scattered bruises including perineum. Assess with WOC RN who feels the bruises are consistent with falls.  at bedside, supportive. Continue close monitoring/care.

## 2019-10-07 NOTE — PROGRESS NOTES
MD Notification    Notified Person: MD    Notified Person Name: Dr. Lee    Notification Date/Time: 10/7/2019 7:06 AM    Notification Interaction: Paged    Purpose of Notification:Ammonia 133     Orders Received:    Comments:

## 2019-10-07 NOTE — H&P
[facility]    History and Physical  Hospitalist       Date of Admission:  10/7/2019    Assessment & Plan   Radha Turcios is a 59 year old female who presents as transfer from [facility] for hepatic encephalopathy and renal failure    Hepatic encephalopathy  Likely alcoholic liver cirrhosis  Ascites  Hyperbilirubinemia  Long drinking history, last drink was 1 month ago per . Worsening mental status with decreased intake over the past few weeks. CT head negative for intracranial abnormality. Ammonia 114 at [facility], now 133 this AM. Bilirubin 2, direct 1.5.   - Admit inpatient, [facility] status  - Lactulose enema x2 overnight, then trial 20gm lactulose TID if more awake  - Rifaximin BID  - GI consult  - IVF @50ml/hr, received one time albumin 25g overnight for lower BP  - Paracentesis for large volume ascites (seen on CT abdomen)  - Small volume ascitic fluid drawn at [facility], but does not seem consistent with SBP--no need for abx coverage at this time.    Lactic acidosis  - Initially elevated, improved with IVF and albumin received overnight    Acute renal failure  Previously normal Cr 0.54 in June 2019. Now Cr up to 4.9--4.12 this AM  Possible hepatorenal syndrome contributing, may also have some pre-renal component as well with decreased intake  - IVF received overnight  - Nephrology consulted  - FENA ordered  - Low rate IVF, while NPO, albumin q8h for 2 more doses (total 3 doses today)    Hypokalemia  K 2.8, no plans for repletion at this time, due to ongoing renal failure.    Anemia  Thrombocytopenia  Likely secondary to liver disease (at least a portion). However Hgb drop is much more extreme- 5 pts in span of 4 months.    - GI consult  - Trend labs  - Check heme-occult in case, as her hgb did drop from 13.6 to 8 in span of 4 months  - Check type and screen  - Called  for consent, left 2 messages, awaiting return call.    Constipation  Has not had BM in 10 days. Continue lactulose as above. Now  having BMs after initial dose of enema.    Agoraphobia  Anxiety  PTA on elavil, xanax, viibryd  - Hold all PTA meds while NPO  - Consideration for psychiatry consult once her encephalopathy is improved    Distal left clavicle, displaced fracture  - Found incidentally on CXR  - Consult orthopedic surgery for opinion, she is not in any shape for surgical intervention at this time.  - No pain medications at this time until she is no longer encephalopathic    DVT Prophylaxis: Pneumatic Compression Devices  Code Status: Full Code, discussed with . He thinks she might be considering DNR-DNI, but he wants to leave this decision up to her once she recovers more from her encephalopathy  Expected discharge: 3-4 days, recommended to likely prior living arrangement, pending recovery.    Kate Lee DO    Primary Care Physician   Concepcion Mcintyre    Chief Complaint   Altered mental status    History is obtained from the patient's  Lexie and medical record    History of Present Illness   Radha Turcios is a 59 year old female who presents as transfer from [facility]. She has history of alcohol abuse, sober x1 month. She has had ongoing issues over the past 3 weeks with intermittent confusion, increased fatigue, poor intake, abdominal distention. She has had waxing.waning encephalopathy. This past week she has had decreased intake, and has been spending more time in bed. She initially refused to come to the ED, but  was able to convince her to present on 10/6.  denies much knowledge about her health issues as she kept from him an update about her liver enzymes over the summer. She had no BM in the past 10 days and her urination has decreased recently.  She is unable to add much to history. She received partial dose of PO lactulose at [facility] ED.    Past Medical History    I have reviewed this patient's medical history and updated it with pertinent information if needed.   Past Medical History:    Diagnosis Date     Abnormal pap     distant past  normal since then     Allergic rhinitis      Anxiety      Depression      Elevated blood protein     resolved  negative serum protein electrophoresis      GERD (gastroesophageal reflux disease)      Hyperlipemia      Insomnia      Macrocytosis without anaemia     unclear cause      Panic disorder with agoraphobia, severe agoraphobic avoidance and mild panic attacks     stable on viibryd/xanax- failed paxil/ zoloft/ wellbutrin/seroquel/effexor/klonopin     Recurrent cold sores      Tobacco abuse        Past Surgical History   I have reviewed this patient's surgical history and updated it with pertinent information if needed.  Past Surgical History:   Procedure Laterality Date     NO HISTORY OF SURGERY         Prior to Admission Medications   Prior to Admission Medications   Prescriptions Last Dose Informant Patient Reported? Taking?   ALPRAZolam (XANAX) 0.5 MG tablet   No No   Sig: Take 1 tablet (0.5 mg) by mouth 2 times daily as needed for anxiety Prescription to last one month   Cyanocobalamin (B-12) 1000 MCG TBCR   No No   Sig: Take 1,000 mcg by mouth daily   Omega-3 Fatty Acids (FISH OIL CONCENTRATE) 1000 MG CAPS  Spouse/Significant Other Yes No   Sig: Take 2,000 mg by mouth daily    amitriptyline (ELAVIL) 25 MG tablet   No No   Sig: TAKE 1 TO 2 TABLETS AT BEDTIME   omeprazole (PRILOSEC) 20 MG DR capsule   No No   Sig: Take 1 capsule (20 mg) by mouth daily   rosuvastatin (CRESTOR) 20 MG tablet  Spouse/Significant Other Yes No   Sig: Take 20 mg by mouth daily   valACYclovir (VALTREX) 500 MG tablet   No No   Sig: TAKE 4 TABLETS ONCE, REPEAT IN 12 HOURS AS NEEDED   vilazodone (VIIBRYD) 40 MG TABS tablet   No No   Sig: Take 1 tablet (40 mg) by mouth daily   vitamin D3 (CHOLECALCIFEROL) 2000 units (50 mcg) tablet  Spouse/Significant Other Yes No   Sig: Take 1 tablet by mouth daily      Facility-Administered Medications: None     Allergies   Allergies   Allergen  Reactions     Amoxicillin      Rash       Social History   I have reviewed this patient's social history and updated it with pertinent information if needed. Radha Turcios  reports that she has been smoking cigarettes. She has a 23.00 pack-year smoking history. She has never used smokeless tobacco. She reports current alcohol use. She reports that she does not use drugs.    Family History   I have reviewed this patient's family history and updated it with pertinent information if needed.   Family History   Problem Relation Age of Onset     Lipids Mother      Heart Disease Mother      Alzheimer Disease Mother      Allergies Mother      C.A.D. Mother         had MI     Cancer Father 58        pancreatic or prostate?     Heart Disease Brother      C.A.D. Brother         2 brothers  of heart attacks/ one also had lung caner     Cancer Brother         2 brothers   of lung cancer     Cancer Sister         lung     Alcohol/Drug Sister          alcoholic cirrhosis     Depression Sister         anxiety disorder     Depression Brother         anxiety disorder     Colon Cancer Sister 51     Breast Cancer Sister        Review of Systems   The 10 point Review of Systems is negative other than noted in the HPI    Physical Exam   Temp: 98  F (36.7  C) Temp src: Axillary BP: 91/52 Pulse: 103 Heart Rate: 101 Resp: (!) 37 SpO2: 98 %      Vital Signs with Ranges  Temp:  [96.1  F (35.6  C)-98.2  F (36.8  C)] 98  F (36.7  C)  Pulse:  [] 103  Heart Rate:  [] 101  Resp:  [17-37] 37  BP: ()/(49-86) 91/52  SpO2:  [93 %-100 %] 98 %  0 lbs 0 oz    Constitutional: Drowsy, dusky, mild distress  Eyes: Conjunctiva and pupils examined and normal.  HEENT: Dry mucous membranes, normal dentition.  Respiratory: Clear to auscultation bilaterally, no crackles or wheezing.  Cardiovascular: Regular rate and rhythm, normal S1 and S2, and no murmur noted.  GI: soft, tender to palpation, palpable stool in abdomen, no enlarged  liver felt, distended  Lymph/Hematologic: No anterior cervical or supraclavicular adenopathy.  Skin: No rashes, no cyanosis, non pitting bilateral lower extremity edema. Multiple ecchymosis  Musculoskeletal: No joint swelling, erythema or tenderness.  Neurologic: Moving all extremities, moaning. Not able to participate  Psychiatric: Unable to ascertain.    Data   Data reviewed today:  I personally reviewed CT head without acute intracranial abnormality, CT AP with large ascites, mesenteric edema, gallbladder with gallstones, chronic inflammation of ascending colon, colonic diverticulosis, fibroid uterus. CXR with distal clavicle fracture.  Recent Labs   Lab 10/07/19  0612 10/06/19  1852   WBC 14.2* 15.3*   HGB 6.9* 7.9*  8.2*   * 112*   PLT 78* 106*   INR  --  2.05*    133  133   POTASSIUM 2.8* 2.8*  2.8*   CHLORIDE 103 94  92*   CO2 22 26   BUN 48* 48*  43*   CR 4.12* 4.26*   ANIONGAP 11 13  19*   KARLEY 7.3* 7.9*   GLC 77 98  98   ALBUMIN 2.1* 1.8*   PROTTOTAL 6.5* 6.9   BILITOTAL 2.0* 2.3*   ALKPHOS 86 99   ALT 26 30   AST 59* 60*       Recent Results (from the past 24 hour(s))   XR Chest Port 1 View    Narrative    EXAM: XR CHEST PORT 1 VW  LOCATION: [facility]  DATE/TIME: 10/6/2019 6:53 PM    INDICATION: Loss of consciousness.  COMPARISON: None.      Impression    IMPRESSION: Shallow inspiration. Patient is rotated to the left. Displaced fracture distal left clavicle. Normal heart size. Lungs clear.    Abd/pelvis CT no contrast - Stone Protocol    Narrative    CT ABDOMEN AND PELVIS WITHOUT CONTRAST  10/6/2019 8:29 PM    HISTORY:  ETOH liver, ascites, encephalopathic.    TECHNIQUE: Scans obtained from the diaphragm through the pelvis  without oral or IV contrast.   Radiation dose for this scan was reduced using automated exposure  control, adjustment of the mA and/or kV according to patient size, or  iterative reconstruction technique.    COMPARISON:  None.    FINDINGS: Motion  artifact limits this study.  Visualized portions of the lung bases and mediastinal contents are  unremarkable. There are no aggressive osseous lesions.    Liver is somewhat small. The gallbladder is filled with radiopaque  material which could be gallstones. The liver, gallbladder, pancreas,  spleen, bilateral adrenal glands and bilateral kidneys are otherwise  unremarkable for a noncontrast CT. No hydronephrosis, nephrolithiasis,  hydroureter or ureteral calculus is identified. Urinary bladder is  grossly unremarkable.    Uterus contains calcified fibroid. Ovaries are not well seen. No  adnexal masses are identified. No adenopathy or free air is seen in  the peritoneal cavity.    There is a large amount of ascites. There is edema in the mesenteric  adipose tissues.    The colon is grossly of normal caliber without pericolonic  inflammatory change to suggest acute diverticulitis. There are  multiple diverticuli in the sigmoid colon. The does appear to be some  low-attenuation thickening of the cecal/ascending colon region of  uncertain etiology. This could represent colitis. It could also  represent chronic inflammation causing fatty infiltration of the wall.  Appendix is not well seen likely due to the edema and ascites in the  peritoneal cavity. Small bowel is grossly of normal caliber. Stomach  has a mildly thickened wall likely due to incomplete distention. There  is some fluid and air in the stomach.      Impression    IMPRESSION:  1. Large amount of ascites and mesenteric edema are likely secondary  to liver failure, given the patient's history. The liver is small  which could be secondary to cirrhosis. No evidence for splenomegaly,  however, to suggest portal venous hypertension.  2. Gallbladder is filled with radiopaque material which could  represent gallstones.  3. Low-attenuation thickening of the wall of the cecum and ascending  colon could represent chronic inflammatory change. Appendix is  not  well-seen on this study.  4. Colonic diverticulosis without evidence for acute diverticulitis.  5. Fibroid uterus.  6. Evaluation of solid organs of the abdomen and pelvis is  significantly limited by lack of IV contrast. Evaluation of the hollow  organs is limited by lack of oral contrast.    DARSHANA SHI MD   CT Head w/o Contrast    Narrative    EXAM: CT HEAD W/O CONTRAST  LOCATION: [facility]  DATE/TIME: 10/6/2019 7:43 PM    INDICATION: LOC. Alcohol withdrawal.  COMPARISON: None  TECHNIQUE: Routine without IV contrast. Multiplanar reformats. Dose reduction techniques were used.    FINDINGS:  INTRACRANIAL CONTENTS: No intracranial hemorrhage. Cerebral parenchymal volume is within normal limits. No midline shift. The basilar cisterns are patent. No CT evidence for an acute infarct.    VISUALIZED ORBITS/SINUSES/MASTOIDS: No intraorbital abnormality. No paranasal sinus mucosal disease. No middle ear or mastoid effusion.    BONES/SOFT TISSUES: No acute abnormality.      Impression    IMPRESSION:  1.  No intracranial hemorrhage, mass lesions, hydrocephalus or CT evidence for an acute infarct.

## 2019-10-07 NOTE — PROGRESS NOTES
MD Notification    Notified Person: MD    Notified Person Name: Dr. Lee    Notification Date/Time: 10/7/2019 6:39 AM    Notification Interaction: Paged    Purpose of Notification: Pt hemoglobin 6.9.     Orders Received:    Comments:

## 2019-10-07 NOTE — PHARMACY-ADMISSION MEDICATION HISTORY
Admission medication history interview status for this patient is complete. See [facility] admission navigator for allergy information, prior to admission medications and immunization status.     Medication history interview source(s):Family  Medication history resources (including written lists, pill bottles, clinic record):None  Primary pharmacy: [place], [place] ([address])    Changes made to PTA medication list:  Added: Crestor, vit D  Deleted: none  Changed: none    Actions taken by pharmacist (provider contacted, etc): Spoke with Radha Owen's .     Additional medication history information:None    Medication reconciliation/reorder completed by provider prior to medication history? No     Do you take OTC medications (eg tylenol, ibuprofen, fish oil, eye/ear drops, etc)? Yes       Prior to Admission medications    Medication Sig Last Dose Taking? Auth Provider   amitriptyline (ELAVIL) 25 MG tablet TAKE 1 TO 2 TABLETS AT BEDTIME 10/5/2019 at PM Yes Concepcion Mcintyre DO   Cyanocobalamin (B-12) 1000 MCG TBCR Take 1,000 mcg by mouth daily 10/6/2019 at AM Yes Lavern Soriano MD   Omega-3 Fatty Acids (FISH OIL CONCENTRATE) 1000 MG CAPS Take 2,000 mg by mouth daily  10/6/2019 at AM Yes Reported, Patient   omeprazole (PRILOSEC) 20 MG DR capsule Take 1 capsule (20 mg) by mouth daily 10/6/2019 at AM Yes Concepcion Mcintyre DO   rosuvastatin (CRESTOR) 20 MG tablet Take 20 mg by mouth daily 10/6/2019 at AM Yes Reported, Patient   vilazodone (VIIBRYD) 40 MG TABS tablet Take 1 tablet (40 mg) by mouth daily 10/6/2019 at AM Yes Concepcion Mcintyre DO   vitamin D3 (CHOLECALCIFEROL) 2000 units (50 mcg) tablet Take 1 tablet by mouth daily 10/6/2019 at AM Yes Reported, Patient   ALPRAZolam (XANAX) 0.5 MG tablet Take 1 tablet (0.5 mg) by mouth 2 times daily as needed for anxiety Prescription to last one month More than a month  Concepcion Mcintyre DO   valACYclovir (VALTREX) 500 MG tablet TAKE 4 TABLETS ONCE, REPEAT  IN 12 HOURS AS NEEDED Unknown  Concepcion Mcintyre, DO

## 2019-10-07 NOTE — CONSULTS
GASTROENTEROLOGY CONSULTATION     Radha Turcios   [id number]   [facility] [id number]   59 year old female   Admission Date/Time: 10/7/2019   Encounter Date: 10/7/2019  Primary Care Provider: Concepcion Mcintyre     Referring / Attending Physician: Kate Lee   We were asked to see the patient in consultation by Dr. Lee for evaluation of hepatic encephalopathy.     HPI: Radha Turcios is a 59 year old female with a past medical history significant for anxiety, depression, hyperlipidemia, GERD, macrocytosis and EtOH abuse who presented to the ED at [facility] with confusion and weakness.  The patient is obtunded this morning so her history is obtained completely from the chart and nursing staff.  The patient has a long-standing history of alcohol abuse and her last drink was about 1 month ago according to the .  Over the last couple weeks she has had a worsening mental status as well as decreased oral intake for the last couple of weeks.  In the emergency department at [facility] she was found to have an elevated ammonia.  CT of the head was negative for any intracranial abnormality.  She was given lactulose enemas overnight and admitted to the CICU at [facility].  According to the patient's nurse the patient had a couple of bowel movements overnight following the lactulose enemas.  She denies any hematochezia or melena.  There are no family members present this morning.  Labs from this morning show a creatinine of 4.12 down from 4.26 yesterday.  Total bilirubin is elevated at 2.0 and AST is elevated at 59.  Hemoglobin in the emergency department is 8.2 and is drifted down to 6.9 however there is been no evidence of GI blood loss at this time.  CT of the abdomen and pelvis was performed on admission which did reveal a large amount of ascites and mesenteric edema.  There is also a low-attenuation thickening of the wall of the cecum and ascending colon.  X-ray of her  clavicle last night did reveal a longitudinal oblique fracture of the distal left clavicle without significant displacement    Past Medical History  Past Medical History:   Diagnosis Date     Abnormal pap     distant past  normal since then     Allergic rhinitis      Anxiety      Depression      Elevated blood protein     resolved  negative serum protein electrophoresis      GERD (gastroesophageal reflux disease)      Hyperlipemia      Insomnia      Macrocytosis without anaemia     unclear cause      Panic disorder with agoraphobia, severe agoraphobic avoidance and mild panic attacks     stable on viibryd/xanax- failed paxil/ zoloft/ wellbutrin/seroquel/effexor/klonopin     Recurrent cold sores      Tobacco abuse        Past Surgical History  Past Surgical History:   Procedure Laterality Date     NO HISTORY OF SURGERY         Family History  Family History   Problem Relation Age of Onset     Lipids Mother      Heart Disease Mother      Alzheimer Disease Mother      Allergies Mother      C.A.D. Mother         had MI     Cancer Father 58        pancreatic or prostate?     Heart Disease Brother      C.A.D. Brother         2 brothers  of heart attacks/ one also had lung caner     Cancer Brother         2 brothers   of lung cancer     Cancer Sister         lung     Alcohol/Drug Sister          alcoholic cirrhosis     Depression Sister         anxiety disorder     Depression Brother         anxiety disorder     Colon Cancer Sister 51     Breast Cancer Sister        Social History  Social History     Socioeconomic History     Marital status:      Spouse name: Not on file     Number of children: Not on file     Years of education: Not on file     Highest education level: Not on file   Occupational History     Not on file   Social Needs     Financial resource strain: Not on file     Food insecurity:     Worry: Not on file     Inability: Not on file     Transportation needs:     Medical: Not on file      Non-medical: Not on file   Tobacco Use     Smoking status: Current Every Day Smoker     Packs/day: 1.00     Years: 23.00     Pack years: 23.00     Types: Cigarettes     Smokeless tobacco: Never Used   Substance and Sexual Activity     Alcohol use: Yes     Alcohol/week: 0.0 standard drinks     Comment: 4-5 drinks per week     Drug use: No     Sexual activity: Never     Partners: Male   Lifestyle     Physical activity:     Days per week: Not on file     Minutes per session: Not on file     Stress: Not on file   Relationships     Social connections:     Talks on phone: Not on file     Gets together: Not on file     Attends Temple service: Not on file     Active member of club or organization: Not on file     Attends meetings of clubs or organizations: Not on file     Relationship status: Not on file     Intimate partner violence:     Fear of current or ex partner: Not on file     Emotionally abused: Not on file     Physically abused: Not on file     Forced sexual activity: Not on file   Other Topics Concern     Parent/sibling w/ CABG, MI or angioplasty before 65F 55M? No      Service Not Asked     Blood Transfusions Not Asked     Caffeine Concern Not Asked     Occupational Exposure Not Asked     Hobby Hazards Not Asked     Sleep Concern Not Asked     Stress Concern Not Asked     Weight Concern Not Asked     Special Diet Not Asked     Back Care Not Asked     Exercise Not Asked     Bike Helmet Not Asked     Seat Belt Not Asked     Self-Exams Not Asked   Social History Narrative    6 of 11 siblings have passed away    Brother  of MI age 51  had lung cancer    Brother MI     Sister  alcoholic Liver disease/Cirrhosis    Sister  age 48 lung cancer was a non smoker     Brother  Lung cancer  Smoker     Brother  stomach cancer         Dad pancreatic cancer age 58     Mom lived to age 89       Medications  Prior to Admission medications    Medication Sig Start Date End Date Taking? Authorizing  Provider   ALPRAZolam (XANAX) 0.5 MG tablet Take 1 tablet (0.5 mg) by mouth 2 times daily as needed for anxiety Prescription to last one month 6/19/19  Yes Concepcion Mcintyre DO   amitriptyline (ELAVIL) 25 MG tablet TAKE 1 TO 2 TABLETS AT BEDTIME 6/19/19  Yes Concepcion Mcintyre DO   Cyanocobalamin (B-12) 1000 MCG TBCR Take 1,000 mcg by mouth daily 8/10/16  Yes Lavern Soriano MD   Omega-3 Fatty Acids (FISH OIL CONCENTRATE) 1000 MG CAPS Take 2,000 mg by mouth daily    Yes Reported, Patient   omeprazole (PRILOSEC) 20 MG DR capsule Take 1 capsule (20 mg) by mouth daily 9/24/19  Yes Concepcion Mcintyre DO   rosuvastatin (CRESTOR) 20 MG tablet Take 20 mg by mouth daily   Yes Reported, Patient   valACYclovir (VALTREX) 500 MG tablet TAKE 4 TABLETS ONCE, REPEAT IN 12 HOURS AS NEEDED 6/19/19  Yes Concepcion Mcintyre DO   vilazodone (VIIBRYD) 40 MG TABS tablet Take 1 tablet (40 mg) by mouth daily 6/19/19  Yes Concepcion Mcintyre DO   vitamin D3 (CHOLECALCIFEROL) 2000 units (50 mcg) tablet Take 1 tablet by mouth daily   Yes Reported, Patient       Allergies:  Amoxicillin    ROS: A ten point review of systems was obtained and negative other than the symptoms noted above in the HPI.     Physical Exam:   BP 91/52   Pulse 103   Temp 98  F (36.7  C) (Axillary)   Resp (!) 37   LMP 09/14/2001   SpO2 98%    Constitutional: age appropriate, alert, no acute distress  Cardiovascular: regular rate and rhythm, no murmurs,rubs or gallops  Respiratory: clear to auscultation bilaterally  Psychiatric: obtunded  Head: atraumatic, normocephalic  Neck: supple, no thyromegaly  ENT: mucous membranes are moist, no oral lesions are noted  Abdomen: soft, non-tender, distended, normally active bowel sound. No masses or hepatosplenomegaly is appreciated. No rebound tenderness or guarding  Neuro: Clonus present   Skin: warm, dry, no rashes are noted    ADDITIONAL COMMENTS:   I reviewed the patient's new clinical lab test results.   Recent Labs   Lab Test  10/07/19  0612 10/06/19  1852 06/19/19  1044   WBC 14.2* 15.3* 6.2   HGB 6.9* 7.9*  8.2* 13.6   * 112* 109*   PLT 78* 106* 127*   INR  --  2.05*  --       Recent Labs   Lab Test 10/07/19  0612 10/06/19  1852 06/19/19  1044    133  133 141   POTASSIUM 2.8* 2.8*  2.8* 4.2   CHLORIDE 103 94  92* 105   CO2 22 26 23   BUN 48* 48*  43* 8   CR 4.12* 4.26* 0.54   ANIONGAP 11 13  19* 13   KARLEY 7.3* 7.9* 8.6      Recent Labs   Lab Test 10/07/19  0612 10/06/19  2053 10/06/19  1852 06/19/19  1044  08/10/16  1337  09/04/15  1154   ALBUMIN 2.1*  --  1.8* 2.9*   < >  --    < >  --    BILITOTAL 2.0*  --  2.3* 1.0   < >  --    < >  --    ALT 26  --  30 41   < >  --    < >  --    AST 59*  --  60* 137*   < >  --    < >  --    ALKPHOS 86  --  99 170*   < >  --    < >  --    PROTEIN  --  100*  --   --   --  Negative  --  Trace*    < > = values in this interval not displayed.      10/6/19 CT abdomen pelvis without contrast  IMPRESSION:  1. Large amount of ascites and mesenteric edema are likely secondary  to liver failure, given the patient's history. The liver is small  which could be secondary to cirrhosis. No evidence for splenomegaly,  however, to suggest portal venous hypertension.  2. Gallbladder is filled with radiopaque material which could  represent gallstones.  3. Low-attenuation thickening of the wall of the cecum and ascending  colon could represent chronic inflammatory change. Appendix is not  well-seen on this study.  4. Colonic diverticulosis without evidence for acute diverticulitis.  5. Fibroid uterus.  6. Evaluation of solid organs of the abdomen and pelvis is  significantly limited by lack of IV contrast. Evaluation of the hollow  organs is limited by lack of oral contrast.    Assessment: 59-year-old female with a history of alcohol abuse presenting with confusion and weakness.  On exam the patient does appear encephalopathic.  She does have a large amount of ascites and tenderness on her physical  exam so I am concerned about potential SBP.  She has a low hemoglobin but no evidence of GI blood loss.  Her acute renal failure could be secondary to SBP or could be related to her decreased oral intake over the course of the last couple of weeks. Pt likely has EtOH cirrhosis based on her presentation and labs.     Plan:   -NG tube placement for lactulose administration  -Nephrology consultation pending  -Paracentesis with fluid analysis  -Follow hgb and stool output. Blood transfusions per IM  -Follow LFTs and renal function daily  -Eventual EGD to screen for varices  -Eventual hepatology clinic follow up   -EtOH abstinence  -[facility] following    I discussed the patient's findings and plan with Dr. Meghana Shetty who will also independently see and examine the patient.     Addison Hardy PA-C  [facility]  Cell:  [phone] Monday through Friday 3203-1865  Office: [phone]      GI ATTENDING NOTE  Patient seen and examined. Agree with above.  Patient still confused and lactulose not yet started.  Paracentesis pending as well    AF VSS with low normal hemoglobin  Abdomen distended    A/P  Confusion - Suspect hepatic encephalopathy   Lactulose to start  Ascites indicative of significant liver disease. Paracentesis for further diagnostic purposes   Concern for developing hepatorenal . Watch creatinine  Low hemoglobin - no evidence of ongoing bleeding Check vitamin b12 ad iron   EGD and colonoscopy can be scheduled once more stable.    Will continue to follow  Meghana Shetty MD  [facility]  Office

## 2019-10-07 NOTE — ED NOTES
Patient returned from CT with right AC IV out. IV restarted. Patient agitated and restless. MD aware.

## 2019-10-07 NOTE — PROGRESS NOTES
[facility] Nurse Inpatient Skin Assessment     Initial Assessment of:   Coccyx/ gluteal cleft        Data:   Patient History:      per MD note(s):  59 year old female who presents as transfer from [facility]. She has history of alcohol abuse, sober x1 month. She has had ongoing issues over the past 3 weeks with intermittent confusion, increased fatigue, poor intake, abdominal distention. She has had waxing/waning encephalopathy. This past week she has had decreased intake, and has been spending more time in bed. She initially refused to come to the ED, but  was able to convince her to present on 10/6.  A distal clavicle fracture was seen on a chest xray in the ED.  Family states patient has had multiple falls recently but cannot recall her complaining of specific left shoulder pain.     Soren Risk Assessment  Sensory Perception: 1-->completely limited    Moisture: 3-->occasionally moist   Activity: 1-->bedfast     Mobility: 2-->very limited   Nutrition: 2-->probably inadequate   Soren Score: 11    Positioning: Pillows,     Mattress:  Standard , Atmos Air mattress- approrpiate    Moisture Management:  Urinary Catheter    Catheter secured? Yes    Current Diet / Nutrition:       Orders Placed This Encounter        NPO for Medical/Clinical Reasons Except for: No Exceptions          Labs:   Recent Labs   Lab Test 10/07/19  0612 10/06/19  1852   ALBUMIN 2.1* 1.8*   HGB 6.9* 7.9*  8.2*   INR  --  2.05*   WBC 14.2* 15.3*         Skin Assessment (location):   Coccyx, gluteal cleft  History:  Pt weakened, lives at home with , frequent falls at home.  At this time in CCU with a sitter, decreased LOC at this time.   Epidermis intact throughout the coccyx, gluteal cleft, buttocks.  She has bright pink, blanchable erythema found at the superior aspect of the coccyx, here the skin has a natural mal-formations of the skin with a fully epithelialized little circular hole              Intervention:     Patient's chart evaluated.      Assessed: tissue with the RN as noted above    Orders  Written    Supplies  reviewed, discussed with RN, discussed with family and discussed with patient    Discussed plan of care with Patient and Family          All patient / family questions answered:  YES        Assessment:        No evidence of pressure injury to the coccyx, gluteal cleft, buttocks.  Pt has a small circular divot at the superior aspect of the coccyx, this is not a PI, it is either a healed pilonidal cyst from long ago or just a natural formation of the coccyx.   The bright pink erythema looks like fungal- dust TID x 5 days.           Plan:   Nursing to notify the Provider(s) and re-consult the WOC Nurse if skin deteriorate(s).    Skin care plan to perineal, coccyx/ gluteal cleft: TID and prn   Clean with Holger Cleanse and Protect  Dust with antifungal powder x 5 days, stopping on October 13, 2019.  Consider dusting with baby powder at that time.    WOC Nurse will return: no need to continue to follow

## 2019-10-07 NOTE — CONSULTS
[facility]    Orthopedic Consultation    Radha Turcios MRN# [id number]   Age: 59 year old YOB: 1960     Date of Admission: 10/7/2019    Reason for consult: Left distal clavicle fracture        Requesting physician: Kate Lee DO       Level of consult: One-time consult to assist in determining a diagnosis and to recommend an appropriate treatment plan           Assessment and Plan:   Assessment:   Left distal clavicle fracture        Plan:   Non-operative management  Sling on Right UE for comfort, able to remove prn  Ok to use walker if needed for balance  Ice to left shoulder prn  Follow-up with Orthopedics in 2-3 weeks for follow-up xrays.            Chief Complaint:   Incidental finding on chest Xray         History of Present Illness:   Radha Turcios is a 59 year old female who presents as transfer from [facility]. She has history of alcohol abuse, sober x1 month. She has had ongoing issues over the past 3 weeks with intermittent confusion, increased fatigue, poor intake, abdominal distention. She has had waxing/waning encephalopathy. This past week she has had decreased intake, and has been spending more time in bed. She initially refused to come to the ED, but  was able to convince her to present on 10/6.  A distal clavicle fracture was seen on a chest xray in the ED.  Family states patient has had multiple falls recently but cannot recall her complaining of specific left shoulder pain.             Past Medical History:     Past Medical History:   Diagnosis Date     Abnormal pap     distant past  normal since then     Allergic rhinitis      Anxiety      Depression      Elevated blood protein     resolved  negative serum protein electrophoresis      GERD (gastroesophageal reflux disease)      Hyperlipemia      Insomnia      Macrocytosis without anaemia     unclear cause      Panic disorder with agoraphobia, severe agoraphobic avoidance and mild panic attacks      stable on viibryd/xanax- failed paxil/ zoloft/ wellbutrin/seroquel/effexor/klonopin     Recurrent cold sores      Tobacco abuse              Past Surgical History:     Past Surgical History:   Procedure Laterality Date     NO HISTORY OF SURGERY               Social History:     Social History     Tobacco Use     Smoking status: Current Every Day Smoker     Packs/day: 1.00     Years: 23.00     Pack years: 23.00     Types: Cigarettes     Smokeless tobacco: Never Used   Substance Use Topics     Alcohol use: Yes     Alcohol/week: 0.0 standard drinks     Comment: 4-5 drinks per week             Family History:     Family History   Problem Relation Age of Onset     Lipids Mother      Heart Disease Mother      Alzheimer Disease Mother      Allergies Mother      C.A.D. Mother         had MI     Cancer Father 58        pancreatic or prostate?     Heart Disease Brother      C.A.D. Brother         2 brothers  of heart attacks/ one also had lung caner     Cancer Brother         2 brothers   of lung cancer     Cancer Sister         lung     Alcohol/Drug Sister          alcoholic cirrhosis     Depression Sister         anxiety disorder     Depression Brother         anxiety disorder     Colon Cancer Sister 51     Breast Cancer Sister              Immunizations:     VACCINE/DOSE   Diptheria   DPT   DTAP   HBIG   Hepatitis A   Hepatitis B   HIB   Influenza   Measles   Meningococcal   MMR   Mumps   Pneumococcal   Polio   Rubella   Small Pox   TDAP   Varicella   Zoster             Allergies:     Allergies   Allergen Reactions     Amoxicillin      Rash             Medications:     Current Facility-Administered Medications   Medication     albumin human 25 % injection 25 g     Daily 2 GRAM acetaminophen limit, unless fulminent liver failure or transaminases greater than or equal to 300 - 400, then none     dextrose 5% and 0.45% NaCl + KCl 20 mEq/L infusion     lactulose (CHRONULAC) solution 20 g     lidocaine (LMX4) cream      lidocaine 1 % 0.1-1 mL     melatonin tablet 1 mg     naloxone (NARCAN) injection 0.1-0.4 mg     nitroGLYcerin (NITROSTAT) sublingual tablet 0.4 mg     ondansetron (ZOFRAN-ODT) ODT tab 4 mg    Or     ondansetron (ZOFRAN) injection 4 mg     rifaximin (XIFAXAN) tablet 550 mg     senna-docusate (SENOKOT-S/PERICOLACE) 8.6-50 MG per tablet 1 tablet    Or     senna-docusate (SENOKOT-S/PERICOLACE) 8.6-50 MG per tablet 2 tablet             Review of Systems:   Patient minimally responsive at time of exam.            Physical Exam:   All vitals have been reviewed  Patient Vitals for the past 24 hrs:   BP Temp Temp src Pulse Heart Rate Resp SpO2   10/07/19 1158 -- -- -- -- -- -- 98 %   10/07/19 1100 93/65 -- -- -- 104 28 99 %   10/07/19 1000 96/49 -- -- -- 102 26 92 %   10/07/19 0900 102/54 -- -- -- 105 30 93 %   10/07/19 0800 107/52 -- -- -- 101 (!) 35 99 %   10/07/19 0723 -- 98  F (36.7  C) Axillary -- -- -- --   10/07/19 0700 91/52 -- -- 103 101 (!) 37 98 %   10/07/19 0500 98/49 -- -- 94 94 (!) 32 99 %   10/07/19 0400 103/59 -- -- 99 99 28 97 %   10/07/19 0300 101/52 -- -- 97 96 17 100 %   10/07/19 0200 -- -- -- -- 100 30 98 %   10/07/19 0100 (!) 87/49 97.6  F (36.4  C) Axillary 93 93 30 100 %       Intake/Output Summary (Last 24 hours) at 10/7/2019 1209  Last data filed at 10/7/2019 0900  Gross per 24 hour   Intake 1125 ml   Output 40 ml   Net 1085 ml     Patient sedated, minimal response at time of exam.  Unable to answer questions  Mild swelling along distal clavicle on left vs right.  No response of pain with palpation. Able to passive ROM her elbow and wrist without pain expression.  Distal pulses are present.  Capillary refill <2 seconds.  Unable to assess AROM          Data:   All laboratory data reviewed  Results for orders placed or performed during the hospital encounter of 10/07/19   XR Clavicle Left    Narrative    CLAVICLE LEFT TWO VIEWS  October 7, 2019 10:48 AM     HISTORY: Left clavicle fracture.       Impression    IMPRESSION: Longitudinal-oblique fracture of the distal clavicle  without significant displacement.   Comprehensive metabolic panel   Result Value Ref Range    Sodium 136 133 - 144 mmol/L    Potassium 2.8 (L) 3.4 - 5.3 mmol/L    Chloride 103 94 - 109 mmol/L    Carbon Dioxide 22 20 - 32 mmol/L    Anion Gap 11 3 - 14 mmol/L    Glucose 77 70 - 99 mg/dL    Urea Nitrogen 48 (H) 7 - 30 mg/dL    Creatinine 4.12 (H) 0.52 - 1.04 mg/dL    GFR Estimate 11 (L) >60 mL/min/[1.73_m2]    GFR Estimate If Black 13 (L) >60 mL/min/[1.73_m2]    Calcium 7.3 (L) 8.5 - 10.1 mg/dL    Bilirubin Total 2.0 (H) 0.2 - 1.3 mg/dL    Albumin 2.1 (L) 3.4 - 5.0 g/dL    Protein Total 6.5 (L) 6.8 - 8.8 g/dL    Alkaline Phosphatase 86 40 - 150 U/L    ALT 26 0 - 50 U/L    AST 59 (H) 0 - 45 U/L   CBC with platelets   Result Value Ref Range    WBC 14.2 (H) 4.0 - 11.0 10e9/L    RBC Count 1.87 (L) 3.8 - 5.2 10e12/L    Hemoglobin 6.9 (LL) 11.7 - 15.7 g/dL    Hematocrit 20.0 (L) 35.0 - 47.0 %     (H) 78 - 100 fl    MCH 36.9 (H) 26.5 - 33.0 pg    MCHC 34.5 31.5 - 36.5 g/dL    RDW 19.2 (H) 10.0 - 15.0 %    Platelet Count 78 (L) 150 - 450 10e9/L   Bilirubin direct   Result Value Ref Range    Bilirubin Direct 1.5 (H) 0.0 - 0.2 mg/dL   Ammonia   Result Value Ref Range    Ammonia 133 (HH) 10 - 50 umol/L   Fractional excretion of sodium   Result Value Ref Range    Creatinine Urine 181 mg/dL    Sodium Urine mmol/L 9 mmol/L    %FENA 0.2 %   Lactic acid level STAT   Result Value Ref Range    Lactate for Sepsis Protocol 1.6 0.7 - 2.0 mmol/L   Sodium   Result Value Ref Range    Sodium 139 133 - 144 mmol/L   Magnesium   Result Value Ref Range    Magnesium 1.4 (L) 1.6 - 2.3 mg/dL   Nephrology IP Consult: Patient to be seen: Routine - within 24 hours; acute renal failure; Consultant may enter orders: Yes; Requesting provider? Hospitalist (if different from attending physician)    Narrative    ONEL Figueroa MD     10/7/2019 10:48 AM  Noted dictated    ABO/Rh type and screen   Result Value Ref Range    Units Ordered 1     ABO PENDING     Antibody Screen PENDING     Test Valid Only At [facility]        Specimen Expires 10/10/2019     Crossmatch Red Blood Cells           Attestation:  I have reviewed today's vital signs, notes, medications, labs and imaging with Dr. Corbett.  Amount of time performed on this consult: 20 minutes.    Verna Martinez PA-C

## 2019-10-07 NOTE — PLAN OF CARE
Pt admitted for encephalopathy. GCS of 8-9. Will occasionally arouse to pain. Speech is incoherent, slurred. Tele is SR/ST. BP soft, Albumin given x1 w/ BP improvement. IVF infusing. Lactulose enema given x1. Pt able to have multiple BM's.  at bedside for part of night. Hgb 6.9 in AM; MD notified. Appears restless/uncomfortable- bedside attendant present. Continue to monitor.

## 2019-10-07 NOTE — PROVIDER NOTIFICATION
MD Notification    Notified Person: MD    Notified Person Name: Dr. Lee    Notification Date/Time: 0500 10/7    Notification Interaction: Text page    Purpose of Notification: Need pain meds, possibly Toradol or something IV?    Orders Received: We are unable to give pain meds at this time, will reassess when patient becomes more alert.    Comments:

## 2019-10-07 NOTE — PROGRESS NOTES
[facility]    Consult received .  Will see this patient this morning.    Meghana Shetty MD  [facility]  Office

## 2019-10-07 NOTE — PROGRESS NOTES
[facility]    Hospitalist Progress Note    Assessment & Plan   59 year old female who presents as transfer from [facility] for hepatic encephalopathy and renal failure, and admitted to [facility] 10/7/2019:    Impression:   Principal Problem:    Hepatic encephalopathy (H)  Active Problems:    Anxiety    Smoker    Alcoholic cirrhosis of liver with ascites (H)    ARF (acute renal failure) (H)    Distal left Clavicle Fracture    Lactic acidosis    Hypokalemia    Anemia    Coagulopathy (H)    Hypomagnesemia      Plan:  IV fluids ordered to help with ARF, getting 1 unit of PRBC's, checking BMP at 1400 and repeat Hgb.  Abdominal paracentesis planned under US today, but not cooperative yet, and given her coagulopathy will hold off.  She did have 10 ml paracentesis at [facility] with 148 WBC and fluid appeared clear -- not suggestive of SBP.  Too lethargic to take PO, will give lactulose down soft feeding tube.  Vit K for coagulopathy.  Left message with  to call me for update.  Appreciate nephrology input.     DVT Prophylaxis: her INR is currently 2 and she is high risk to bleed  Code Status: Full Code    Disposition: Expected discharge in several days.     Dudley Willis MD  Pager [phone]  Cell Phone [phone]  Text Page (7am to 6pm)    Interval History   Moaning, but answering questions but responds to pain.  Had BM today which was brown/green.      Physical Exam   Temp: 97.4  F (36.3  C) Temp src: Axillary BP: 95/56 Pulse: 103 Heart Rate: 103 Resp: (!) 34 SpO2: 98 % O2 Device: Oxymask Oxygen Delivery: 5 LPM  There were no vitals filed for this visit.  Vital Signs with Ranges  Temp:  [96.1  F (35.6  C)-98.5  F (36.9  C)] 97.4  F (36.3  C)  Pulse:  [] 103  Heart Rate:  [] 103  Resp:  [17-37] 34  BP: ()/(49-86) 95/56  SpO2:  [92 %-100 %] 98 %  No intake/output data recorded.    # Pain Assessment:  Current Pain Score 10/7/2019   Patient currently in pain? yes   - Radha is  experiencing discomfort but non-specific. Pain management was discussed and the plan was created in a collaborative fashion.  Radha's response to the current recommendations: confused  - hold sedating medications at this time.     Constitutional: Lethargic, does arouse to stimulation, to confused to give a meaningful answer  Respiratory: Clear to auscultation bilaterally, no crackles or wheezing  Cardiovascular: Regular rate and rhythm, normal S1 and S2, and no murmur noted  GI: bowel sounds present, soft, non-distended -- no obvious ascites but ascites present on CT, non-tender   Extrem: No calf tenderness, no ankle edema  Neuro: lethargic but moves all extremities, unable to follow commands for testing    Medications     - MEDICATION INSTRUCTIONS -       dextrose 5% and 0.45% NaCl + KCl 20 mEq/L 125 mL/hr at 10/07/19 1002       albumin human  25 g Intravenous Q8H     lactulose  20 g Per NG tube TID     magnesium sulfate  4 g Intravenous Once     phytonadione  10 mg Per NG tube Once     rifaximin  550 mg Oral BID       Data   Recent Labs   Lab 10/07/19  1127 10/07/19  0612 10/06/19  1852   WBC  --  14.2* 15.3*   HGB  --  6.9* 7.9*  8.2*   MCV  --  107* 112*   PLT  --  78* 106*   INR  --   --  2.05*    136 133  133   POTASSIUM  --  2.8* 2.8*  2.8*   CHLORIDE  --  103 94  92*   CO2  --  22 26   BUN  --  48* 48*  43*   CR 4.04* 4.12* 4.26*   ANIONGAP  --  11 13  19*   KARLEY  --  7.3* 7.9*   GLC  --  77 98  98   ALBUMIN  --  2.1* 1.8*   PROTTOTAL  --  6.5* 6.9   BILITOTAL  --  2.0* 2.3*   ALKPHOS  --  86 99   ALT  --  26 30   AST  --  59* 60*       Imaging:   Recent Results (from the past 24 hour(s))   XR Chest Port 1 View    Narrative    EXAM: XR CHEST PORT 1 VW  LOCATION: [facility]  DATE/TIME: 10/6/2019 6:53 PM    INDICATION: Loss of consciousness.  COMPARISON: None.      Impression    IMPRESSION: Shallow inspiration. Patient is rotated to the left. Displaced fracture distal left clavicle.  Normal heart size. Lungs clear.    Abd/pelvis CT no contrast - Stone Protocol    Narrative    CT ABDOMEN AND PELVIS WITHOUT CONTRAST  10/6/2019 8:29 PM    HISTORY:  ETOH liver, ascites, encephalopathic.    TECHNIQUE: Scans obtained from the diaphragm through the pelvis  without oral or IV contrast.   Radiation dose for this scan was reduced using automated exposure  control, adjustment of the mA and/or kV according to patient size, or  iterative reconstruction technique.    COMPARISON:  None.    FINDINGS: Motion artifact limits this study.  Visualized portions of the lung bases and mediastinal contents are  unremarkable. There are no aggressive osseous lesions.    Liver is somewhat small. The gallbladder is filled with radiopaque  material which could be gallstones. The liver, gallbladder, pancreas,  spleen, bilateral adrenal glands and bilateral kidneys are otherwise  unremarkable for a noncontrast CT. No hydronephrosis, nephrolithiasis,  hydroureter or ureteral calculus is identified. Urinary bladder is  grossly unremarkable.    Uterus contains calcified fibroid. Ovaries are not well seen. No  adnexal masses are identified. No adenopathy or free air is seen in  the peritoneal cavity.    There is a large amount of ascites. There is edema in the mesenteric  adipose tissues.    The colon is grossly of normal caliber without pericolonic  inflammatory change to suggest acute diverticulitis. There are  multiple diverticuli in the sigmoid colon. The does appear to be some  low-attenuation thickening of the cecal/ascending colon region of  uncertain etiology. This could represent colitis. It could also  represent chronic inflammation causing fatty infiltration of the wall.  Appendix is not well seen likely due to the edema and ascites in the  peritoneal cavity. Small bowel is grossly of normal caliber. Stomach  has a mildly thickened wall likely due to incomplete distention. There  is some fluid and air in the stomach.       Impression    IMPRESSION:  1. Large amount of ascites and mesenteric edema are likely secondary  to liver failure, given the patient's history. The liver is small  which could be secondary to cirrhosis. No evidence for splenomegaly,  however, to suggest portal venous hypertension.  2. Gallbladder is filled with radiopaque material which could  represent gallstones.  3. Low-attenuation thickening of the wall of the cecum and ascending  colon could represent chronic inflammatory change. Appendix is not  well-seen on this study.  4. Colonic diverticulosis without evidence for acute diverticulitis.  5. Fibroid uterus.  6. Evaluation of solid organs of the abdomen and pelvis is  significantly limited by lack of IV contrast. Evaluation of the hollow  organs is limited by lack of oral contrast.    DARSHANA SHI MD   CT Head w/o Contrast    Narrative    EXAM: CT HEAD W/O CONTRAST  LOCATION: [facility]  DATE/TIME: 10/6/2019 7:43 PM    INDICATION: LOC. Alcohol withdrawal.  COMPARISON: None  TECHNIQUE: Routine without IV contrast. Multiplanar reformats. Dose reduction techniques were used.    FINDINGS:  INTRACRANIAL CONTENTS: No intracranial hemorrhage. Cerebral parenchymal volume is within normal limits. No midline shift. The basilar cisterns are patent. No CT evidence for an acute infarct.    VISUALIZED ORBITS/SINUSES/MASTOIDS: No intraorbital abnormality. No paranasal sinus mucosal disease. No middle ear or mastoid effusion.    BONES/SOFT TISSUES: No acute abnormality.      Impression    IMPRESSION:  1.  No intracranial hemorrhage, mass lesions, hydrocephalus or CT evidence for an acute infarct.   XR Clavicle Left    Narrative    CLAVICLE LEFT TWO VIEWS  October 7, 2019 10:48 AM     HISTORY: Left clavicle fracture.      Impression    IMPRESSION: Longitudinal-oblique fracture of the distal clavicle  without significant displacement.   XR Abdomen 1 View    Narrative    ABDOMEN ONE VIEW   10/7/2019 10:48 AM      HISTORY: Feeding tube placement verification.    COMPARISON: None.      Impression    IMPRESSION: Enteric feeding tube tip projects over the distal stomach  versus first portion of the duodenum. Nonobstructive bowel.

## 2019-10-07 NOTE — PLAN OF CARE
OT and PT: Discussed with nurse, patient not appropriate for rehab evals today. Restless, low hgb, lethargic.

## 2019-10-07 NOTE — CONSULTS
Consult Date:  10/07/2019      IDENTIFICATION:  A 59-year-old male admitted via the emergency room dated 10/06/2019 in the setting of altered mental status.      REASON FOR CONSULTATION:  Evaluation and assessment with respect to apparent acute kidney injury in the setting of advanced liver disease.      IMPRESSION:   1.  Baseline normal renal function on the basis of data review with creatinine prior to admission dated 06/19/2019 of 0.54 mg/dL.   2.  Acute kidney injury, presumably prerenal versus acute tubular necrosis on the basis of review.  Presenting creatinine 4.9 mg/dL.  Hydration overnight has improved renal function to a value of 4.12 mg/dL.  Urinalysis does demonstrate some hyalin casts, as well as a small amount of blood, protein and white cells.  Protein dipstick is positive.  Urine sodium was noted to be 9.  Urine is likely partially contaminated urine sodium, suggests likely prerenal etiology.  Cannot rule out prerenal versus hepatorenal at this time.  However, she does appear to be improving with fluid, which would suggest that she is prerenal.   3.  Hypokalemia.   4.  Modest metabolic alkalosis.   5.  Hypoalbuminemia.   6.  Probable cirrhosis with portal hypertension on the basis of CT scan.   7.  Hepatic encephalopathy with documented ammonia level of 133.   8.  History of anxiety/depression.   9.  Macrocytosis.      DISCUSSION:  Middle-aged woman with apparent alcoholic liver disease complicated by portal hypertension and ascites.  She presents with encephalopathy, as well as acute kidney injury.  Evaluation of her renal function suggests decreased renal perfusion, representing either prerenal etiology versus hepatorenal syndrome.  Given her apparent response to fluid resuscitation, we are favoring a diagnosis of a prerenal acute kidney injury.  Cannot rule out potential ATN if her recovery were to stall.      PLAN:   1.  Continue IV fluid.   2.  Quantification of urine protein excretion.   3.   Repeat urinalysis.   4.  Avoid further nephrotoxic agents at this time.   5.  No current dialysis indication.   6.  Ongoing evaluation and management under the direction of Hospitalist and Gastroenterology Services.      HISTORY:  No history available directly from the patient.  She is lethargic and obtunded.  She does arouse to deep stimulation.      Her  is present in the room.  He provides the majority of the history.  She has not felt well for several days or longer with increasing weakness and confusion.  Refused repeated attempts at hospitalization previously.  However, on the day of admission, she requested being taken to the hospital.      Please see the emergency room note for details regarding that initial evaluation.      She has had apparent significant alcohol history and stopped drinking in mid September.      ALLERGIES:  AMOXICILLIN.      ADMISSION MEDICATIONS:     1.  Xanax.   2.  Amitriptyline.   3.  Omeprazole.   4.  Valtrex.   5.  Vilazodone.        We are unclear as to the amount of Valtrex she may have been taking recently.  Given her alterations in renal function, it may be a contributing factor to her obtundation.      PAST MEDICAL HISTORY:     1.  Anxiety.   2.  Depression.   3.  Hyperlipidemia.   4.  Alcohol abuse.   5.  Macrocytosis.   6.  Insomnia.      FAMILY HISTORY:  Reviewed in the medical record, not obtained directly from the patient.      SOCIAL HISTORY:  Reviewed in the medical record, includes ongoing alcohol and tobacco.      REVIEW OF SYSTEMS:  Complete 10-point review of systems is unobtainable in the patient's current clinical situation.      PHYSICAL EXAMINATION:   VITAL SIGNS:  She is afebrile.  Rhythm is sinus.   Her blood pressure is in the range of 110 systolic.  Pulse is approximately 100.  Her sats are adequate.   GENERAL:  Disheveled, older than stated age, responsive only to deep stimulation.   HEENT:  Normocephalic, atraumatic.  Pupils equal, round, reactive  to light and accommodation.  Extraocular muscles intact.  Sclerae nonicteric.  Conjunctivae not injected.  External auditory canals and nares visually patent.  She has a feeding tube that has been placed.   CHEST:  Symmetric and clear.   CARDIOVASCULAR:  Regular.   ABDOMEN:  Distended, but soft.   EXTREMITIES:  Without cyanosis, clubbing.  She has bilateral edema.   NEUROLOGIC:  Grossly nonfocal.  Moves all 4.   PSYCHIATRIC:  Unable.      LABORATORY DATA:  Current and historic reviewed in detail.         ONEL BOLAÑOS MD             D: 10/07/2019   T: 10/07/2019   MT: ALTAF      Name:     ESTUARDO JARRETT   MRN:      [id number]        Account:       [id number]   :      1960           Consult Date:  10/07/2019      Document: [id number]

## 2019-10-08 PROBLEM — J96.01 ACUTE RESPIRATORY FAILURE WITH HYPOXIA (H): Status: ACTIVE | Noted: 2019-01-01

## 2019-10-08 PROBLEM — K92.2 GI BLEED: Status: ACTIVE | Noted: 2019-01-01

## 2019-10-08 NOTE — PLAN OF CARE
PT/OT: Spoke with RN regarding evaluations. Pt currently not following commands, is a bit more awake, but recommends holding one more day in hopes of more alert and ability to follow as well as change to a lift room for optimal safe pt handling.

## 2019-10-08 NOTE — CODE/RAPID RESPONSE
Brief house [facility] note:    Contacted by nursing staff regarding increased work of breathing and increased supplemental oxygen demand.  Patient is now on 8 L Oxymizer mask.  Chest x-ray demonstrated some volume overload along with an elevated BNP of ~ 5600. Patient appears to have acute renal failure along with poor synthetic function demonstrated by elevated INR.    Diagnosis:  - acute hypoxic respiratory failure 2/2 volume overload    Interventions:  - Chest x-ray and proBNP  - IV fluids stopped  - Would consider IV Lasix, but will hold off at this time as patient is hemodynamically stable.     Please page with any additional concerns,    TANYA Cabrera, CNP  Hospitalist - House [facility]  Text Page  [phone])

## 2019-10-08 NOTE — PLAN OF CARE
"PLAN: ongoing lactulose, tube feed initiated today, rehab, monitor renal function.     CNS: Oriented to name only for most of shift, but at 1800 knew in [facility] in [place]. Knew it was October at 1830 as well, but could not name year/day/president. Disoriented to situation all day, kept asking for help to \"get out of here\". Mod strength X 4. AFebrile, denies pain. Eyes open spontaneously for most of shift, restless. Sitter at bedside. Speech garbled mainly, difficult to follow. Inconsistent obeying commands, eg. Cannot follow pen light with eyes but will squeeze hands and wiggle toes. Answers simple questions appropriately. Pupils 4mm, Brisk, equal bilat.  CVS: ST in 100's to 110's, SBP improving to 110's this afternoon. Denies dizziness. No periph edema, but significant ascites.   RESP: weaned to 3LPM NC. Diminished air entry to bases bilat.  GI: lactulose BID, frequent smears but X 1 big loose/watery brown stool this morning post lactulose. NPO. Tube feed running, isosource 1.5 @ 30 mL/h with water flush 150 ml q4h. Distended/firm abdo.    : joiner in situ. Output 555 mL since AM, yellow cloudy output.  SKIN: scattered bruising, open area on gluteal dimple, and gluteal rash. Miconazole applied.   IV: left and right hand PIV's S/L. PCD's on.   MOB: bedrest today, PT to reassess tomorrow. Moved to lift room.   FAM:  Lexie in, updated by Dr Harper.   "

## 2019-10-08 NOTE — PROGRESS NOTES
GASTROENTEROLOGY PROGRESS NOTE    CC: Hepatic encephalopathy    SUBJECTIVE:  Patient talking appropriately but not oriented to place or date    OBJECTIVE:  General Appearance:  Thin women in NAD  /80   Pulse 108   Temp 98  F (36.7  C) (Axillary)   Resp 20   Wt 66.1 kg (145 lb 11.2 oz)   LMP 2001   SpO2 98%   BMI 24.82 kg/m    Temp (24hrs), Av  F (36.7  C), Min:97.7  F (36.5  C), Max:98.3  F (36.8  C)    Patient Vitals for the past 72 hrs:   Weight   10/08/19 0400 66.1 kg (145 lb 11.2 oz)       Intake/Output Summary (Last 24 hours) at 10/8/2019 1443  Last data filed at 10/8/2019 1400  Gross per 24 hour   Intake 2190 ml   Output 1030 ml   Net 1160 ml       PHYSICAL EXAM  Cardiovascular: negative, PMI normal. No lifts, heaves, or thrills. RRR. No murmurs, clicks gallops or rub  Abdomen distended but non tender        Additional Comments:  ROS, FH, SH: See initial GI consult for details.    I have reviewed the patient's new clinical lab results:    Recent Labs   Lab Test 10/08/19  1257 10/08/19  0532 10/07/19  2349  10/07/19  0612 10/06/19  185   WBC  --  12.0* 11.9*  --  14.2* 15.3*   HGB 8.3* 8.1* 8.2*   < > 6.9* 7.9*  8.2*   MCV  --  102* 103*  --  107* 112*   PLT  --  62* 72*  --  78* 106*   INR  --  2.05*  --   --   --  2.05*    < > = values in this interval not displayed.     Recent Labs   Lab Test 10/08/19  1257 10/08/19  0532 10/07/19  1412   POTASSIUM 4.6 2.5* 3.8   CHLORIDE 113* 106 108   CO2 21 22 24   BUN 39* 44* 44*   ANIONGAP 6 10 6     Recent Labs   Lab Test 10/07/19  2220 10/07/19  0612 10/06/19  2053 10/06/19  1852 19  1044  08/10/16  1337   ALBUMIN  --  2.1*  --  1.8* 2.9*   < >  --    BILITOTAL  --  2.0*  --  2.3* 1.0   < >  --    ALT  --  26  --  30 41   < >  --    AST  --  59*  --  60* 137*   < >  --    PROTEIN 30*  --  100*  --   --   --  Negative    < > = values in this interval not displayed.         Principal Problem:    Hepatic encephalopathy (H)   Improving with  TID lactulose but improvement is slow   Continue lactulose but do not hold for diarrhea   Diet okay, 2 g Na   Kidney function compromise   Slow improvement    Nephrology involved.  Low hemoglobin   Continue to follow . No evidence of acute bleeding.    Will continue to monitor.      Meghana Shetty MD  [facility]  Office

## 2019-10-08 NOTE — PROGRESS NOTES
Renal Medicine Progress Note                                Radha Turcios MRN# [id number]   Age: 59 year old YOB: 1960   Date of Admission: 10/7/2019 Hospital LOS: 1                  Assessment/Plan:     59-year-old male admitted via the emergency room dated 10/06/2019 in the setting of altered mental status.     Evaluated with respect to apparent acute kidney injury in the setting of advanced liver disease.     1.  Baseline normal renal function   -creatinine 06/19/19 0.54 mg/dl  2.  ARF   -oliguric   -Rose 9   -pre renal v HRS (improving with hydration)  3.  Dipstick proteinuria   -ACR pending  4.  Hypokalemia   5.  Metabolic alkalosis   -improved  6.  Hypoalbuminemia  7.  Cirrhosis   -portal hypertension   -encephalopathy      IVF held for SOB    No diuretic  Replace electrolytes  Follow labs  Could use colloid 25% albumin if necessary        Interval History:     Perhaps more alert today.  Awake but does not follow.  IO and UO reviewed.   Discussed with RN and aide at bedside      ROS:     ROS unable      Medications and Allergies:     Reviewed    Physical Exam:     Vitals were reviewed  Patient Vitals for the past 8 hrs:   BP Temp Temp src Pulse Heart Rate Resp SpO2 Weight   10/08/19 0800 116/65 98.2  F (36.8  C) Axillary 105 103 23 97 % --   10/08/19 0600 92/64 -- -- 103 102 19 94 % --   10/08/19 0500 -- -- -- -- -- -- 95 % --   10/08/19 0400 101/66 -- -- 102 101 28 93 % 66.1 kg (145 lb 11.2 oz)   10/08/19 0300 -- -- -- -- -- -- 94 % --   10/08/19 0200 103/54 -- -- 89 84 (!) 35 94 % --     I/O last 3 completed shifts:  In: 2925 [I.V.:2300; NG/GT:500]  Out: 795 [Urine:795]    Vitals:    10/08/19 0400   Weight: 66.1 kg (145 lb 11.2 oz)         GENERAL: uncooperative  HEENT: nose and mouth without ulcers or lesions  RESP:  clear anteriorly  CV: RRR, normal S1 S2  ABDOMEN: distended  MS: no clubbing, cyanosis   SKIN: clear without significant rashes or lesions  NEURO: speech normal and cranial  nerves 2-12 intact  PSYCH: affect flat and confused  EXT: warm, no edema    Data:     Recent Labs   Lab 10/08/19  0532 10/07/19  1412 10/07/19  1127 10/07/19  0612 10/06/19  1852    138 139 136 133  133   POTASSIUM 2.5* 3.8  --  2.8* 2.8*  2.8*   CHLORIDE 106 108  --  103 94  92*   CO2 22 24  --  22 26   ANIONGAP 10 6  --  11 13  19*   GLC 98 298*  --  77 98  98   BUN 44* 44*  --  48* 48*  43*   CR 3.26* 3.70* 4.04* 4.12* 4.26*   GFRESTIMATED 15* 13* 11* 11* 11*  9*   GFRESTBLACK 17* 15* 13* 13* 12*  11*   KARLEY 8.1* 7.1*  --  7.3* 7.9*         Recent Labs   Lab 10/08/19  0532 10/07/19  1412 10/07/19  1127 10/07/19  0612 10/06/19  1852   CR 3.26* 3.70* 4.04* 4.12* 4.26*     Recent Labs   Lab 10/07/19  0612 10/06/19  1852   ALBUMIN 2.1* 1.8*     Recent Labs   Lab 10/08/19  0532 10/07/19  2349 10/07/19  2032 10/07/19  0612   PHOS 4.4  --   --   --    HGB 8.1* 8.2* 8.1* 6.9*     Recent Labs   Lab 10/07/19  2220   COLOR Yellow   APPEARANCE Slightly Cloudy   URINEGLC Negative   URINEBILI Negative   URINEKETONE Negative   SG 1.015   UBLD Moderate*   URINEPH 5.5   PROTEIN 30*   NITRITE Negative   LEUKEST Moderate*   RBCU 32*   WBCU 40*     Recent Labs   Lab 10/07/19  0702   UNAR 9         G Paul Figueroa MD    [facility] - Nephrology  [phone]

## 2019-10-08 NOTE — CONSULTS
CLINICAL NUTRITION SERVICES  -  ASSESSMENT NOTE    RECOMMENDATIONS FOR MD/PROVIDER TO ORDER:   1. Recommend Goal TF as follows:     Enteral Regimen: Isosource 1.5 at 45 mL/hr x 24 hours    Total Enteral Provisions: 1080 mL provides 1620 kcal (28 kcal per kg), 73 gm protein (1.3 gm/kg), 190 gm CHO, 15 gm fiber and 821 mL H20.    Meets 100% of DRI's.    Start at 30 mL/hr, advancement per MD    2. Enter Separate Free Water order for 150 mL q4 hours   3. Daily electrolyte check  4. Daily weights  5. Recommend Folate 1 mg daily per protocol for ETOH abuse   Recommendations Ordered by Registered Dietitian (RD):   Requested consult for RD to manage EN per scope of practice  Provided education regarding EN start to family present at bedside    Malnutrition:   % Weight Loss: Unable to assess w/ lack of wt hx  % Intake:  </= 75% for >/= 1 month (severe malnutrition)  Subcutaneous Fat Loss:  Orbital region moderate depletion and Upper arm region mild depletion  Muscle Loss:  Temporal region mild depletion, Clavicle bone region mild depletion and Dorsal hand region mild depletion  Fluid Retention:  Moderate (ascites)     Malnutrition Diagnosis: Non-Severe malnutrition  In Context of:  Acute illness or injury  Chronic illness or disease  Environmental or social circumstances     REASON FOR ASSESSMENT  Radha Turcios is a 59 year old female seen by Registered Dietitian for Provider Order - Registered Dietitian to Assess and Recommend TF.  Provider is responsible for entering the TF orders    NUTRITION HISTORY  - Information obtained from patient's , EMR.   - EMR: admitted 10/6 with altered mental status. FIGUEROA, advanced liver disease.   - PMH: anxety, depression, hyperlipidemia, GERD, macrocytosis, etoh abuse.   - Admitted with hepatic encephalopathy and renal failure.   - patient has a long drinking history with last drink ~1 month ago. Over the past month she has become more confused, fatigued, distended, with poor oral  "intakes.     - Per patient's , Juan A, she has been eating \"sporaddically\" for the past few months. Generally she would eat just 1 meal/day and maybe a snack. Her usual intakes prior to this were at least 2 meals daily.   - loves chicken, mashed potatoes, and gravy or chicken soup. She also would eat ice cream, mandarin oranges, jello for a snack.   - NKFA      CURRENT NUTRITION ORDERS  Diet Order:     NPO   EN started per MD order:  - Isosource 1.5 @ 30 mL/hr -->   720 mL provides 1080 kcal (18 kcal/kg, 72% goal), 49 gm protein (0.8 gm/kg, 67% goal), 127 gm CHO, 11 gm fiber and 547 mL H20.    Current Intake/Tolerance:  NA - EN not yet started    NUTRITION FOCUSED PHYSICAL ASSESSMENT FOR DIAGNOSING MALNUTRITION)  Completed:  Yes Partial assessment no LEs         Observed:    Muscle wasting (refer to documentation in Malnutrition section) and Subcutaneous fat loss (refer to documentation in Malnutrition section)    Obtained from Chart/Interdisciplinary Team:  Paracentesis ?pending  Previously no BM in 10 days  Distal left clavicle fracture - not appropriate for surgical intervention at this time  Bruising in perineal/vaginal area - consistent with falls per WOCN  Small-bore FT placed for lactulose (also for EN ordered by MD) --> enteric feeding tip projects over distal stomach vs first portion of duodenum    ANTHROPOMETRICS  Height: 5' 4.3\"   Weight: 145 lbs 11.2 oz (66.1 kg) -- ?up with ascites   Body mass index is 24.82 kg/m .  Weight Status:  Normal BMI  IBW: 55.2 kg  % IBW: 120%  Weight History: suspect current wt up with fluid retention. Juan A felt that she may have had some weight loss lately, but could not quantify. She has always been somewhere around 155# for her doctor visits. (?5-10# loss)  Wt Readings from Last 01 Encounters:   10/08/19 66.1 kg (145 lb 11.2 oz)       LABS  Labs reviewed  Recent Labs   Lab Test 10/08/19  0532 10/07/19  1412 10/07/19  0612 10/06/19  1852 06/19/19  1044   POTASSIUM 2.5* " 3.8 2.8* 2.8*  2.8* 4.2     Recent Labs   Lab Test 10/08/19  0532   PHOS 4.4     Recent Labs   Lab Test 10/08/19  0532 10/07/19  0612   MAG 2.7* 1.4*     Recent Labs   Lab Test 10/08/19  0532 10/07/19  1412 10/07/19  1127 10/07/19  0612 10/06/19  1852    138 139 136 133  133     Recent Labs   Lab Test 10/08/19  0532 10/07/19  1412 10/07/19  1127 10/07/19  0612 10/06/19  1852   CR 3.26* 3.70* 4.04* 4.12* 4.26*     Recent Labs   Lab 10/08/19  0532 10/07/19  1412 10/07/19  0612 10/06/19  1852   GLC 98 298* 77 98  98     Lab Results   Component Value Date    A1C 5.0 05/06/2010     Triglycerides   Date Value Ref Range Status   06/19/2019 75 <150 mg/dL Final     Comment:     Non Fasting   10/10/2018 157 (H) <150 mg/dL Final     Comment:     Borderline high:  150-199 mg/dl  High:             200-499 mg/dl  Very high:       >499 mg/dl     10/02/2017 179 (H) <150 mg/dL Final     Comment:     Borderline high:  150-199 mg/dl  High:             200-499 mg/dl  Very high:       >499 mg/dl  Fasting specimen          MEDICATIONS  Medications reviewed  MSSI  Lactulose  Certavite  Thiamine B1 - 100 mg    ASSESSED NUTRITION NEEDS PER APPROVED PRACTICE GUIDELINES:  Dosing Weight 58 kg - adjusted   Estimated Energy Needs: 8053-8644 kcals (25-30 Kcal/Kg)  Justification: maintenance  Estimated Protein Needs: 70-87 grams protein (1.2-1.5 g pro/Kg)  Justification: maintenance  Estimated Fluid Needs: Per MD    MALNUTRITION:  % Weight Loss: Unable to assess w/ lack of wt hx  % Intake:  </= 75% for >/= 1 month (severe malnutrition)  Subcutaneous Fat Loss:  Orbital region moderate depletion and Upper arm region mild depletion  Muscle Loss:  Temporal region mild depletion, Clavicle bone region mild depletion and Dorsal hand region mild depletion  Fluid Retention:  Moderate (ascites)     Malnutrition Diagnosis: Non-Severe malnutrition  In Context of:  Acute illness or injury  Chronic illness or disease  Environmental or social  circumstances    NUTRITION DIAGNOSIS:  Inadequate protein-energy intake related to unsafe for PO and PTA reduced oral intakes 2/2 mentation as evidenced by intakes <75% x1-2 months, e/o fat and muscle wasting, coding for malnutrition.       NUTRITION INTERVENTIONS  Recommendations / Nutrition Prescription  1. Recommend Goal TF as follows:     Enteral Regimen: Isosource 1.5 at 45 mL/hr x 24 hours    Total Enteral Provisions: 1080 mL provides 1620 kcal (28 kcal per kg), 73 gm protein (1.3 gm/kg), 190 gm CHO, 15 gm fiber and 821 mL H20.    Meets 100% of DRI's.    Start at 30 mL/hr, advancement per MD    2. Enter Separate Free Water order for 150 mL q4 hours   3. Daily electrolyte check  4. Daily weights  5. Recommend Folate 1 mg daily per protocol for ETOH abuse    Implementation  Nutrition education: Provided education on EN start per MD order.   EN Composition, EN Schedule, Feeding Tube Flush: as recommended above  Collaboration and Referral of Nutrition care: paged MD    Nutrition Goals  EN to reach recommended goal of 45 ml/hr in the next 48 hours.   EN at goal to meet % estimated needs.       MONITORING AND EVALUATION:  Progress towards goals will be monitored and evaluated per protocol and Practice Guidelines    Myrtle Seay RD, LD  [facility], [facility] [facility], [facility]   Pager: [phone]  Weekend Pager: [phone]

## 2019-10-08 NOTE — PROGRESS NOTES
Cross Cover:  Notified that patient with low potassium, 2.5, but with improving renal function.  Give 20meq KCl via NGT, recheck BMP at noon. No further scheduled potassium or protocol at this time      SHYANNE Lee, DO

## 2019-10-08 NOTE — PROGRESS NOTES
[facility]    Hospitalist Progress Note    Assessment & Plan   59 year old female who presents as transfer from [facility] for hepatic encephalopathy and renal failure, and admitted to [facility] 10/7/2019:    Impression:   Principal Problem:    Hepatic encephalopathy -- improving, NH3 down to 33   -- suspect may have been aggravated by GI bleed   -- discontinue Rifaximin       ARF (acute renal failure) -- improving with hydration   -- suspect related to severe dehydration when obtunded at home      Distal left Clavicle Fx -- appears acute (tender)   -- has left arm sling      Lactic acidosis -- resolved      Hypokalemia -- severe, 2.5 this AM   -- replacement order, check BMP at 1300      Anemia, probable acute blood loss -- stable on PPI   -- hgb at 1300      Coagulopathy -- suspect related to alcoholic liver disease   -- INR 2.05 (unchanged after Vit K)   -- recheck INR tomorrow      Hypomagnesemia -- related to ETOH use, corrected      Thrombocytopenia -- related to ETOH, possible portal HTN      Acute respiratory failure with hypoxia    -- O2 sat 99% on 5 LPM      GI bleed      Active Problems:    Anxiety      Smoker      Alcoholic cirrhosis of liver with ascites    -- will hold off on paracentesis at this point (did have it down at [facility] and 148 WBC's present, does not appear to have SBP), and can do large volume therapeutic paracentesis if needed -- but currently will keep hydrated and support kidneys)        Plan: left message with  to call me for update.  Will start tube feedings (IV fluids stopped last night over concern of possible fluid overload).  Will discuss with Renal and GI.      DVT Prophylaxis: her INR is currently 2 and she is high risk to bleed  Code Status: Full Code    Disposition: Expected discharge ?Friday     Dudley Willis MD  Pager [phone]  Cell Phone [phone]  Text Page (7am to 6pm)    Interval History   Await, restless, able to answer  questions but not able to give meaningful answers.     Physical Exam   Temp: 98.2  F (36.8  C) Temp src: Axillary BP: 116/65 Pulse: 105 Heart Rate: 103 Resp: 23 SpO2: 97 % O2 Device: Oxymask Oxygen Delivery: 5 LPM  Vitals:    10/08/19 0400   Weight: 66.1 kg (145 lb 11.2 oz)     Vital Signs with Ranges  Temp:  [97.3  F (36.3  C)-98.5  F (36.9  C)] 98.2  F (36.8  C)  Pulse:  [] 105  Heart Rate:  [] 103  Resp:  [19-38] 23  BP: ()/(49-70) 116/65  SpO2:  [90 %-100 %] 97 %  I/O last 3 completed shifts:  In: 2925 [I.V.:2300; NG/GT:500]  Out: 795 [Urine:795]    # Pain Assessment:  Current Pain Score 10/8/2019   Patient currently in pain? crista Farrell Radha is experiencing discomfort but non-specific. Pain management was discussed and the plan was created in a collaborative fashion.  Radha's response to the current recommendations: confused  - hold sedating medications at this time.     Constitutional: Lethargic, but able to talk, does not appear in discomfort  Respiratory: Clear to auscultation bilaterally, no crackles or wheezing  Cardiovascular: Regular rate and rhythm, normal S1 and S2, and no murmur noted  GI: bowel sounds present, soft, mildly distended consistent with moderate ascites  Extrem: No calf tenderness, no ankle edema  Neuro: lethargic but moves all extremities, mumbles some words but too weak to squeeze my hands and left shoulder pain with trying to lift left arm off bed, and tender over distal left clavicle.     Medications     - MEDICATION INSTRUCTIONS -       IV fluid REPLACEMENT ONLY         influenza vaccine adult (product based on age)  0.5 mL Intramuscular Prior to discharge     insulin aspart  1-6 Units Subcutaneous Q4H     lactulose  20 g Per NG tube BID     multivitamins w/minerals  15 mL Per Feeding Tube Daily     pantoprazole  40 mg Oral BID AC     potassium chloride  40 mEq Per NG tube Q2H     thiamine  100 mg Intravenous Once     [START ON 10/9/2019] thiamine  100 mg Per NG  tube Daily       Data   Recent Labs   Lab 10/08/19  0532 10/07/19  2349 10/07/19  2032 10/07/19  1412 10/07/19  1127 10/07/19  0612 10/06/19  1852   WBC 12.0* 11.9*  --   --   --  14.2* 15.3*   HGB 8.1* 8.2* 8.1*  --   --  6.9* 7.9*  8.2*   * 103*  --   --   --  107* 112*   PLT 62* 72*  --   --   --  78* 106*   INR 2.05*  --   --   --   --   --  2.05*     --   --  138 139 136 133  133   POTASSIUM 2.5*  --   --  3.8  --  2.8* 2.8*  2.8*   CHLORIDE 106  --   --  108  --  103 94  92*   CO2 22  --   --  24  --  22 26   BUN 44*  --   --  44*  --  48* 48*  43*   CR 3.26*  --   --  3.70* 4.04* 4.12* 4.26*   ANIONGAP 10  --   --  6  --  11 13  19*   KARLEY 8.1*  --   --  7.1*  --  7.3* 7.9*   GLC 98  --   --  298*  --  77 98  98   ALBUMIN  --   --   --   --   --  2.1* 1.8*   PROTTOTAL  --   --   --   --   --  6.5* 6.9   BILITOTAL  --   --   --   --   --  2.0* 2.3*   ALKPHOS  --   --   --   --   --  86 99   ALT  --   --   --   --   --  26 30   AST  --   --   --   --   --  59* 60*       Imaging:   Recent Results (from the past 24 hour(s))   XR Clavicle Left    Narrative    CLAVICLE LEFT TWO VIEWS  October 7, 2019 10:48 AM     HISTORY: Left clavicle fracture.      Impression    IMPRESSION: Longitudinal-oblique fracture of the distal clavicle  without significant displacement.    HERLINDA GREEN MD   XR Abdomen 1 View    Narrative    ABDOMEN ONE VIEW   10/7/2019 10:48 AM     HISTORY: Feeding tube placement verification.    COMPARISON: None.      Impression    IMPRESSION: Enteric feeding tube tip projects over the distal stomach  versus first portion of the duodenum. Nonobstructive bowel.    CURT MARIN MD   XR Chest Port 1 View    Narrative    CHEST SINGLE VIEW PORTABLE  10/8/2019 12:32 AM     HISTORY: Hypoxia.    COMPARISON: 10/6/2019 at 1901 hours.    FINDINGS: Apparent mildly increased interstitial opacities in both  lungs. A few hazy opacities in bilateral lung bases. Normal-sized  cardiac silhouette. Interval  placement of an enteric feeding tube with  distal tip not visualized, but at least to the gastric body.      Impression    IMPRESSION:  1. Apparent mildly increased interstitial opacities in both lungs,  equivocal for interstitial pulmonary edema.  2. A few hazy opacities in bilateral lung bases could represent  atelectasis or pneumonia.    MARTINEZ GONZALEZ MD

## 2019-10-08 NOTE — PLAN OF CARE
Vss except RR 35-40 O2 sats in 80's and oximask increased to 10L.  Sats responded to 93%.  House officer notified , cxr done , ivf d/c'd.  Abdomen rounded and semi firm.  Has keofeed tube in place for meds and is intact.  Placement checked before giving meds and hob kept @ 20-30 degrees.  Keeps legs drawn up and moans @ times.  Having loose stools which are black/green.  Turns head to voice.  Sometimes has unintelligible speech.  W/D to touch.  Turned q2 hr.  Wiggles constantly and changes position.  Has sling on L arm.  Cms good to L hand.

## 2019-10-09 NOTE — PLAN OF CARE
"Discharge Planner PT   Patient plan for discharge: none stated  Current status: PT eval completed and treatment initiated, Pt alert but confused.  Pt's spouse present to give history, pt was previously I with mob and ADL's, then had sudden onset of decline 3 weeks ago, spending the first day in bed, decreased appetite and bowel activty, progressive weakness with ferquent falls, pt alone during the day and spouse would come home to find her on the floor, per spouse pt refused to go to the hospital of allow EMS to be called, after 3 weeks when \"she couldn't refuse anymore\" he call EMS.  Pt dx with hepatic  encephalopathy, and renal failure, distal clavial fx in sling, per chart OK to use FWW, hx of ETOH abuse, GI bleed and anxiety.    Pt currently able to complete sit to stand with mod A and FWW, able to amb in room, limited by lines, 35' total with min A x 2 and LOB x 2 with turns, cues to keep hands on AD and for navigating room, to bed at end of session with mod A and dep scoot in bed.  Sats stable on RA during amb.  Pt following simple commands about 50% of the time.  Unable to follow consistently for LE ex.  Barriers to return to prior living situation: level of A for mob, fall risk, limited toya for activity, decreased strength and balance, impaired cog  Recommendations for discharge: TCU  Rationale for recommendations: Pt will benefit from cont therapies to improve above deficits and progress functional mob I and safety to hopefully allow return to home.       Entered by: JAMSHID ROLAND 10/09/2019 10:18 AM       "

## 2019-10-09 NOTE — PROGRESS NOTES
"   10/09/19 0900   Quick Adds   Type of Visit Initial PT Evaluation   Living Environment   Lives With spouse   Living Arrangements apartment   Home Accessibility stairs within home   Number of Stairs, Within Home, Primary 1   Stair Railings, Within Home, Primary none   Transportation Anticipated family or friend will provide   Self-Care   Usual Activity Tolerance fair   Current Activity Tolerance poor   Regular Exercise No   Equipment Currently Used at Home none   Functional Level Prior   Ambulation 0-->independent   Transferring 0-->independent   Toileting 0-->independent   Bathing 0-->independent   Communication 0-->understands/communicates without difficulty   Fall history within last six months yes   Number of times patient has fallen within last six months   (multiple unwitness)   Which of the above functional risks had a recent onset or change? ambulation;transferring   Prior Functional Level Comment Pt's spouse present to give history, pt was previously I with mob and ADL's, then had sudden onset of decline, spending the first day in bed, decreased appetite and bowel activty, progressive weakness with ferquent fall, pt alone during the day and spouse would come home to find her on the floor, per spouse pt refused to go to the hospital of allow EMS to be called, after 3 weeks when \"she couldn't refuse anymore\" he call EMS.   General Information   Onset of Illness/Injury or Date of Surgery - Date 09/18/19   Referring Physician Kate Lee   Patient/Family Goals Statement none stated   Pertinent History of Current Problem (include personal factors and/or comorbidities that impact the POC) 59 year old female who presents as transfer from [facility] for hepatic encephalopathy and renal failure, and admitted to [facility] 10/7/2019:   Precautions/Limitations fall precautions;oxygen therapy device and L/min  (on 3 lpm via NC)   Weight-Bearing Status - LUE weight-bearing as tolerated   Weight-Bearing Status - RUE " full weight-bearing   Weight-Bearing Status - LLE full weight-bearing   Weight-Bearing Status - RLE full weight-bearing   General Observations spouse present and supportive   Cognitive Status Examination   Orientation person   Level of Consciousness alert   Follows Commands and Answers Questions 50% of the time;able to follow single-step instructions   Personal Safety and Judgment impaired;at risk behaviors demonstrated   Memory impaired   Pain Assessment   Patient Currently in Pain Yes, see Vital Sign flowsheet  (pt calls out in pain when touched on back and arms)   Integumentary/Edema   Integumentary/Edema Comments multiple briusing UE and LE   Range of Motion (ROM)   ROM Quick Adds No deficits were identified   Strength   Strength Comments decreased strength thorugh B LE, not following for MMT, knee extension 3+ to 4-/5   Bed Mobility   Bed Mobility Comments bed mob with mod A x 1 and min A x1, dep scoot to HOB   Transfer Skills   Transfer Comments sit to stand with mod A x 1 and min A x 1   Gait   Gait Comments amb with mod A x 1 adn min A x 1   Balance   Balance Comments impaired standing balance requires B UE support and A x 2 to maintain, LOB x 2 wth turns   General Therapy Interventions   Planned Therapy Interventions balance training;bed mobility training;strengthening;transfer training   Clinical Impression   Criteria for Skilled Therapeutic Intervention yes, treatment indicated   PT Diagnosis  difficulty walking and transferring   Influenced by the following impairments pain, decreased strength, balance, cognititon, act toya, anxiety, repiratory compromise   Functional limitations due to impairments impaired functional mob I and safety   Clinical Presentation Evolving/Changing   Clinical Presentation Rationale 3-5 deficits   Clinical Decision Making (Complexity) Moderate complexity   Therapy Frequency 5x/week   Predicted Duration of Therapy Intervention (days/wks) 4 days   Anticipated Discharge Disposition  "Transitional Care Facility   Risk & Benefits of therapy have been explained Yes   Patient, Family & other staff in agreement with plan of care Yes   [facility] AM-PAC TM \"6 Clicks\"   2016, Trustees of [facility], under license to [employer].  All rights reserved.   6 Clicks Short Forms Basic Mobility Inpatient Short Form   [facility] AM-PAC  \"6 Clicks\" V.2 Basic Mobility Inpatient Short Form   1. Turning from your back to your side while in a flat bed without using bedrails? 2 - A Lot   2. Moving from lying on your back to sitting on the side of a flat bed without using bedrails? 2 - A Lot   3. Moving to and from a bed to a chair (including a wheelchair)? 2 - A Lot   4. Standing up from a chair using your arms (e.g., wheelchair, or bedside chair)? 2 - A Lot   5. To walk in hospital room? 2 - A Lot   6. Climbing 3-5 steps with a railing? 2 - A Lot   Basic Mobility Raw Score (Score out of 24.Lower scores equate to lower levels of function) 12   Total Evaluation Time   Total Evaluation Time (Minutes) 15     "

## 2019-10-09 NOTE — PROGRESS NOTES
10/09/19 0859   Quick Adds   Type of Visit Initial Occupational Therapy Evaluation   Living Environment   Lives With spouse   Living Arrangements apartment   Living Environment Comment Pt reports having an elevator   Self-Care   Usual Activity Tolerance fair   Current Activity Tolerance poor   Activity/Exercise/Self-Care Comment Pt is not currently able to be a reliable historian due to cognitive status. Per RN, pt's spouse was physically assisting her the last 3 weeks with ADL and mobility   Functional Level   Fall history within last six months yes   Number of times patient has fallen within last six months 4   Prior Functional Level Comment Pt unable to report   General Information   Onset of Illness/Injury or Date of Surgery - Date 10/07/19   Referring Physician Kate Lee, DO   Patient/Family Goals Statement Pt reports wanting to get out of here   Additional Occupational Profile Info/Pertinent History of Current Problem 59 year old female who presents as transfer from [facility] for hepatic encephalopathy and renal failure, and admitted to [facility] 10/7/2019. Pt with long history of ETOH cirrhosis. Incidently found to have L distal displaced clavicle fx. Ortho recommended sling for comfort, OK to use walker if needed, non-operative management   Precautions/Limitations fall precautions;other (see comments);oxygen therapy device and L/min   General Info Comments L clavicle fx-OK to use walker per ortho   Cognitive Status Examination   Orientation person   Level of Consciousness alert;confused   Follows Commands (Cognition) follows one step commands;50-74% accuracy   Memory impaired   Attention Distractible during evaluation   Organization/Problem Solving Problem solving impaired;Sequencing impaired   Executive Function Working memory impaired, decreased storage of information for performing tasks;Self awareness/monitoring impaired   Visual Perception   Visual Perception Comments No deficits  "reported   Sensory Examination   Sensory Comments Denies numbness, responds to light touch in all quadrants   Pain Assessment   Patient Currently in Pain Yes, see Vital Sign flowsheet  (says \"ouch\" when touched on back, UEs; multiple bruises)   Integumentary/Edema   Integumentary/Edema Comments multiple bruises; falls history   Posture   Posture Comments difficulty with trunk and head righting when sitting upright   Range of Motion (ROM)   ROM Comment Limited command following; full AROM per RNs who have observed her pulling at lines. Any impaired strength limiting AROM this session   Strength   Strength Comments 3/5 BUE   Hand Strength   Hand Strength Comments weak-fair BUE   Muscle Tone Assessment   Muscle Tone Comments increased tone/spasticity noted in BUE with quick stretch ROM at elbows   Coordination   Functional Limitations Decreased speed;Fine motor ADL performance impaired;Impaired ability to perform bilateral tasks;Object transport impaired;Reach to targets impaired   Mobility   Bed Mobility Comments Max A of 2 supine<>EOB   Transfer Skill: Bed to Chair/Chair to Bed   Level of [place]: Bed to Chair dependent (less than 25% patients effort)   Physical Assist/Nonphysical Assist: Bed to Chair 2 persons   Transfer Skill: Sit to Stand   Level of [place]: Sit/Stand unable to perform   Toilet Transfer   Toilet Transfer Comments would require lift to commode and A of 2   Balance   Balance Comments Mod A EOB sitting balance with impaired righting reactions; not appropriate to initiate standing   Bathing   Level of [place] maximum assist (25% patients effort)   Upper Body Dressing   Level of [place]: Dress Upper Body maximum assist (25% patients effort)   Physical Assist/Nonphysical Assist: Dress Upper Body verbal cues   Lower Body Dressing   Level of [place]: Dress Lower Body dependent (less than 25% patients effort)   Toileting   Level of [place]: Toilet dependent (less than 25% " "patients effort)   Grooming   Level of [place]: Grooming maximum assist (25% patients effort)   Instrumental Activities of Daily Living (IADL)   IADL Comments unable to report; spouse at home to assist   Activities of Daily Living Analysis   Impairments Contributing to Impaired Activities of Daily Living balance impaired;cognition impaired;coordination impaired;flexibility decreased;motor control impaired;muscle tone abnormal;pain;postural control impaired;ROM decreased;strength decreased   General Therapy Interventions   Planned Therapy Interventions ADL retraining;balance training;bed mobility training;cognition;motor coordination training;ROM;strengthening;transfer training;progressive activity/exercise;risk factor education   Clinical Impression   Criteria for Skilled Therapeutic Interventions Met yes, treatment indicated   OT Diagnosis impaired ADL and mobility   Influenced by the following impairments medical status   Assessment of Occupational Performance 5 or more Performance Deficits   Identified Performance Deficits ADL, IADL, mobility, cognition   Clinical Decision Making (Complexity) Moderate complexity   Therapy Frequency Daily   Predicted Duration of Therapy Intervention (days/wks) 2 weeks   Anticipated Equipment Needs at Discharge shower chair;raised toilet seat   Anticipated Discharge Disposition Transitional Care Facility   Risks and Benefits of Treatment have been explained. Yes   Patient, Family & other staff in agreement with plan of care Yes   [facility] [employer] TM \"6 Clicks\"   2016, Trustees of [facility], under license to [employer].  All rights reserved.   6 Clicks Short Forms Daily Activity Inpatient Short Form   [facility]  \"6 Clicks\" Daily Activity Inpatient Short Form   1. Putting on and taking off regular lower body clothing? 1 - Total   2. Bathing (including washing, rinsing, drying)? 2 - A Lot   3. Toileting, which includes using toilet, bedpan or " urinal? 1 - Total   4. Putting on and taking off regular upper body clothing? 2 - A Lot   5. Taking care of personal grooming such as brushing teeth? 2 - A Lot   6. Eating meals? 2 - A Lot   Daily Activity Raw Score (Score out of 24.Lower scores equate to lower levels of function) 10   Total Evaluation Time   Total Evaluation Time (Minutes) 10

## 2019-10-09 NOTE — PLAN OF CARE
A&O to self only. VSS. 2-3L O2 NC. Tele is ST. Restless overnight. 1:1 Sitter. NPO- Tube feeding @ 30 mL/hr (goal). Multiple loose BM's overnight. Continue to monitor.

## 2019-10-09 NOTE — PLAN OF CARE
Discharge Planner OT   Patient plan for discharge: Not stated  Current status: OT eval completed and treatment initiated. Pt alert but confused; oriented to self and month only. Able to follow simple directions appprox 50% of trials. C/o pain when arms and back touched; has L clavicle fx that is non-operative per ortho and multiple bruises presumably from many falls at home. Pt required Max A of 2 supine to EOB. Demo's impaired balance and righting reactions while sitting, requiring Mod A to stay upright at EOB x 5 min. Not appropriate to try standing this session. Used ceiling lift and A of 2 for bed to chair transfer. Pt needs Max cues and hand over hand assist at time for simple grooming tasks while in chair. Alarm on and 1:1 present at end of session. Per ortho note, pt may use a walker for mobility as needed despite L clavicle fx; sling is for comfort.   Barriers to return to prior living situation: Level of assist with all ADL and mobility, cognition, medical status  Recommendations for discharge: TCU  Rationale for recommendations: Would anticipate that patient may have a lengthy course of rehab; would benefit from inpatient rehab-therefor TCU recommended at this time. Will continue to assess needs daily.       Entered by: Heydi Hsu 10/09/2019 9:13 AM

## 2019-10-09 NOTE — PROGRESS NOTES
GASTROENTEROLOGY PROGRESS NOTE     SUBJECTIVE: More alert today but still unable to tell me where she is or what year. Denies abdominal pain     OBJECTIVE:   /63   Pulse 108   Temp 97.8  F (36.6  C)   Resp 23   Wt 66.1 kg (145 lb 11.2 oz)   LMP 2001   SpO2 96%   BMI 24.82 kg/m     Temp (24hrs), Av.6  F (37  C), Min:97.8  F (36.6  C), Max:99.5  F (37.5  C)     Patient Vitals for the past 72 hrs:   Weight   10/08/19 0400 66.1 kg (145 lb 11.2 oz)        Intake/Output Summary (Last 24 hours) at 10/9/2019 1208  Last data filed at 10/9/2019 0807  Gross per 24 hour   Intake 701 ml   Output 855 ml   Net -154 ml      PHYSICAL EXAM   Constitutional: Chronically ill appearing, in bed, no acute distress  Cardiovascular: Regular rate and rhythm. No murmurs rubs or gallops  Respiratory: Clear to auscultation bilaterally  Abdomen: Soft, non-tender, distended, normally active bowel sounds. No masses or hepatosplenomegaly appreciated. No guarding or rebound tenderness.    Additional Comments:   ROS, FH, SH: See initial GI consult for details.     I have reviewed the patient's new clinical lab results:   Recent Labs   Lab Test 10/09/19  0537 10/08/19  1257 10/08/19  0532 10/07/19  2349  10/06/19  1852   WBC 12.8*  --  12.0* 11.9*   < > 15.3*   HGB 8.2* 8.3* 8.1* 8.2*   < > 7.9*  8.2*   *  --  102* 103*   < > 112*   PLT 57*  --  62* 72*   < > 106*   INR 2.01*  --  2.05*  --   --  2.05*    < > = values in this interval not displayed.      Recent Labs   Lab Test 10/09/19  0537 10/08/19  1257 10/08/19  0532   * 140 138   POTASSIUM 3.5 4.6 2.5*   CHLORIDE 114* 113* 106   CO2 23 21 22   BUN 30 39* 44*   CR 2.14* 2.79* 3.26*   ANIONGAP 8 6 10   KARLEY 8.6 8.5 8.1*      Recent Labs   Lab Test 10/07/19  2220 10/07/19  0612 10/06/19  2053 10/06/19  1852 19  1044  08/10/16  1337   ALBUMIN  --  2.1*  --  1.8* 2.9*   < >  --    BILITOTAL  --  2.0*  --  2.3* 1.0   < >  --    ALT  --  26  --  30 41   < >  --     AST  --  59*  --  60* 137*   < >  --    ALKPHOS  --  86  --  99 170*   < >  --    PROTEIN 30*  --  100*  --   --   --  Negative    < > = values in this interval not displayed.      10/6/19 CT abdomen pelvis without contrast  IMPRESSION:  1. Large amount of ascites and mesenteric edema are likely secondary  to liver failure, given the patient's history. The liver is small  which could be secondary to cirrhosis. No evidence for splenomegaly,  however, to suggest portal venous hypertension.  2. Gallbladder is filled with radiopaque material which could  represent gallstones.  3. Low-attenuation thickening of the wall of the cecum and ascending  colon could represent chronic inflammatory change. Appendix is not  well-seen on this study.  4. Colonic diverticulosis without evidence for acute diverticulitis.  5. Fibroid uterus.  6. Evaluation of solid organs of the abdomen and pelvis is  significantly limited by lack of IV contrast. Evaluation of the hollow  organs is limited by lack of oral contrast.     Assessment: 59-year-old female with a history of alcohol abuse presenting with hepatic encephalopathy and new ascites. Ascites was tapped in the ED and was negative for SBP. NG placed and started on lactulose with gradual improvement of her mentation. FIGUEROA continues to improve. Pt likely has EtOH cirrhosis based on her presentation and labs.     Plan:   -Continue lactulose and nutrition via NG  -Follow hgb and stool output. Blood transfusions per IM  -Follow LFTs and renal function daily  -Eventual EGD to screen for varices  -Eventual hepatology clinic follow up   -EtOH abstinence  -[facility] following. Discussed with pt's  and sister at bedside    ANITHA Ramos [facility]  Cell:  [phone] Monday through Friday 4921-6910  Office: [phone]

## 2019-10-09 NOTE — PLAN OF CARE
"Report given to [address], Alfred PAGE. All possessions packed and transfer to room [facility].  present for transfer.    PLAN: ongoing lactulose, rehab, monitor renal function. SLP to reevaluate tomorrow.     CNS: Oriented to name only for most of day, but at 1700 knew at [facility] and \"October\". Mod strength X 4. AFebrile, denies pain. Eyes open spontaneously for most of shift, restless. Had nap from 3962-3534, but mainly restless. Sitter at bedside. Speech garbled mainly, difficult to follow. Inconsistent obeying commands. Answers simple questions appropriately. Pupils 4mm, Brisk, equal bilat.  CVS: ST in 100's to 110's. BP stable. Denies dizziness. No periph edema, but significant ascites.   RESP: weaned to 3LPM NC. Diminished air entry to bases bilat.  GI: lactulose BID, frequent smears but X 1 big loose/watery brown stool this morning post lactulose. NPO. Tube feed running, isosource 1.5 @ 30 mL/h with water flush 200 ml q4h. Distended/firm abdo.    : joiner in situ. Yellow cloudy output. 500 ml output so far today.  SKIN: scattered bruising, open area on gluteal dimple, and gluteal rash. Miconazole applied.   IV: left and right hand PIV's S/L. PCD's on.   MOB: bedrest today, PT to reassess tomorrow. Moved to lift room.   FAM:  Lexie in, updated by Dr Harper.   "

## 2019-10-09 NOTE — PLAN OF CARE
SLP: Orders received and chart reviewed. Patient was lethargic after working with PT. Unable to maintain alertness for safe PO trials.  present at bedside and requested we reschedule for tomorrow. Will reschedule evaluation for 10/10/19.

## 2019-10-09 NOTE — PROGRESS NOTES
[facility]    Hospitalist Progress Note    Assessment & Plan   59 year old female who presents as transfer from [facility] for hepatic encephalopathy and renal failure, and admitted to [facility] 10/7/2019:    Impression:   Principal Problem:    Hepatic encephalopathy -- improving, NH3 down to 33   -- suspect may have been aggravated by GI bleed   -- discontinue Rifaximin       ARF (acute renal failure) -- improving with hydration   -- suspect related to severe dehydration when obtunded at home      Distal left Clavicle Fx -- appears acute (tender)   -- has left arm sling      Lactic acidosis -- resolved      Hypokalemia -- severe, initially, better now   -- replace as needed       Anemia, probable acute blood loss -- stable on PPI   -- seen by [facility], may have EGD at some point      Coagulopathy -- suspect related to alcoholic liver disease   -- INR 2.01 (unchanged after Vit K)   -- recheck INR in several days      Hypomagnesemia -- related to ETOH use, corrected      Thrombocytopenia -- related to ETOH, possible portal HTN      Acute respiratory failure with hypoxia    -- O2 sat 99% on 5 LPM      GI bleed -- suspect UGI bleed given melena, now resolved   -- hgb now stable on PPI   -- [facility] to do EGD at some point (Elective)    Active Problems:    Severe Anxiety/?Agoraphobia   -- she is afraid to leave the house, but  admits he has been buying her alcohol ... but says no more      Smoker      Alcoholic cirrhosis of liver with ascites    -- avoid alcohol         Plan:  Discussed with patient and  today -- will get psych consult tomorrow to address her severe anxiety/agoraphobia.  Discussed with Dr. Figueroa -- renal -- appreciate his excellent work and resolving her ARF.     DVT Prophylaxis: her INR is currently 2 and she is high risk to bleed  Code Status: Full Code -- now that patient improving, readdressed code status with .  All factors considered he would like her to be full code  at this time, he realizes if she chooses to continue to drink that she is likely to die from alcoholism like her sister did.     Disposition: Expected discharge ?Friday -- to TCU.     Dudley Willis MD  Pager [phone]  Cell Phone [phone]  Text Page (7am to 6pm)  (total time 35 min)  Interval History   Awake, answering simple questions.  When mentioned commitment to  patient said she is going home, but otherwise still confused.  Having loose stools, now brown or green (no longer black)     Physical Exam   Temp: 97.8  F (36.6  C) Temp src: Axillary BP: 115/63 Pulse: 108 Heart Rate: 107 Resp: 23 SpO2: 96 % O2 Device: Nasal cannula Oxygen Delivery: 3 LPM  Vitals:    10/08/19 0400   Weight: 66.1 kg (145 lb 11.2 oz)     Vital Signs with Ranges  Temp:  [97.8  F (36.6  C)-99.5  F (37.5  C)] 97.8  F (36.6  C)  Pulse:  [103-115] 108  Heart Rate:  [103-114] 107  Resp:  [14-46] 23  BP: (102-122)/(53-80) 115/63  SpO2:  [94 %-98 %] 96 %  I/O last 3 completed shifts:  In: 841 [NG/GT:841]  Out: 1055 [Urine:1055]    # Pain Assessment:  Current Pain Score 10/9/2019   Patient currently in pain? denies   - Radha is experiencing discomfort but non-specific. Pain management was discussed and the plan was created in a collaborative fashion.  Radha's response to the current recommendations: confused  - hold sedating medications at this time.     Constitutional: Lethargic, but able to talk, does not appear in discomfort  Respiratory: Clear to auscultation bilaterally, no crackles or wheezing  Cardiovascular: Regular rate and rhythm, normal S1 and S2, and no murmur noted  GI: bowel sounds present, soft, mildly distended consistent with moderate ascites  Extrem: No calf tenderness, no ankle edema  Neuro: more alert, able to talk in sentences now.      Medications     - MEDICATION INSTRUCTIONS -       IV fluid REPLACEMENT ONLY         influenza vaccine adult (product based on age)  0.5 mL Intramuscular Prior to  discharge     insulin aspart  1-6 Units Subcutaneous Q4H     lactulose  20 g Per NG tube BID     multivitamins w/minerals  15 mL Per Feeding Tube Daily     pantoprazole  40 mg Oral or Feeding Tube BID AC     thiamine  100 mg Intravenous Daily       Data   Recent Labs   Lab 10/09/19  0537 10/08/19  1257 10/08/19  0532 10/07/19  2349  10/07/19  0612 10/06/19  1852   WBC 12.8*  --  12.0* 11.9*  --  14.2* 15.3*   HGB 8.2* 8.3* 8.1* 8.2*   < > 6.9* 7.9*  8.2*   *  --  102* 103*  --  107* 112*   PLT 57*  --  62* 72*  --  78* 106*   INR 2.01*  --  2.05*  --   --   --  2.05*   * 140 138  --    < > 136 133  133   POTASSIUM 3.5 4.6 2.5*  --    < > 2.8* 2.8*  2.8*   CHLORIDE 114* 113* 106  --    < > 103 94  92*   CO2 23 21 22  --    < > 22 26   BUN 30 39* 44*  --    < > 48* 48*  43*   CR 2.14* 2.79* 3.26*  --    < > 4.12* 4.26*   ANIONGAP 8 6 10  --    < > 11 13  19*   KARLEY 8.6 8.5 8.1*  --    < > 7.3* 7.9*   * 92 98  --    < > 77 98  98   ALBUMIN  --   --   --   --   --  2.1* 1.8*   PROTTOTAL  --   --   --   --   --  6.5* 6.9   BILITOTAL  --   --   --   --   --  2.0* 2.3*   ALKPHOS  --   --   --   --   --  86 99   ALT  --   --   --   --   --  26 30   AST  --   --   --   --   --  59* 60*    < > = values in this interval not displayed.       Imaging:   No results found for this or any previous visit (from the past 24 hour(s)).

## 2019-10-10 NOTE — PROGRESS NOTES
CLINICAL NUTRITION SERVICES - REASSESSMENT NOTE    Recommendations Ordered by Registered Dietitian (RD):   Received Consult for RD to order TF per MNT protocol    Increase EN now to new goal:     Enteral Regimen: Isosource 1.5 at 45 mL/hr x 24 hours    Total Enteral Provisions: 1080 mL provides 1620 kcal (28 kcal per kg), 73 gm protein (1.3 gm/kg), 190 gm CHO, 15 gm fiber and 821 mL H20.    Meets 100% of DRI's.    Fluid flush per MD (nephrology managing)    Daily weights    Daily lyte check    Diet per MD/SLP   Malnutrition: (10/8)   % Weight Loss: Unable to assess w/ lack of wt hx  % Intake:  </= 75% for >/= 1 month (severe malnutrition)  Subcutaneous Fat Loss:  Orbital region moderate depletion and Upper arm region mild depletion  Muscle Loss:  Temporal region mild depletion, Clavicle bone region mild depletion and Dorsal hand region mild depletion  Fluid Retention:  Moderate (ascites)      Malnutrition Diagnosis: Non-Severe malnutrition  In Context of:  Acute illness or injury  Chronic illness or disease  Environmental or social circumstances     EVALUATION OF PROGRESS TOWARD GOALS   Diet: NPO  Nutrition Support: Ordered on 10/8 per MD:   - Isosource 1.5 @ 30 mL/hr -->   720 mL provides 1080 kcal (18 kcal/kg, 72% goal), 49 gm protein (0.8 gm/kg, 67% goal), 127 gm CHO, 11 gm fiber and 547 mL H20.    Intake/Tolerance:   - Pt tolerating EN at sub-optimal rate.   - Labs:   Na 145 (H) -- free water increased yesterday per Nephrology  K 3.0 (L)  Phos 2.4 (L) -- 10/9, no lytes back today  - Meds:   Lactulose 20 g BID  Certavite   Thiamine 100 mg daily   - Stooling:   BM x~6 yest (lactulose)   - Weight changes  None new since admission (daily weights reordered)     ASSESSED NUTRITION NEEDS:  Dosing Weight 58 kg - adjusted   Estimated Energy Needs: 1098-0309 kcals (25-30 Kcal/Kg)  Justification: maintenance  Estimated Protein Needs: 70-87 grams protein (1.2-1.5 g pro/Kg)  Justification: maintenance  Estimated Fluid Needs:  Per MD    NEW FINDINGS:   - SLP assessment today --> NPO, continue EN. Refer to note for more detail  - Nephrology following for FIGUEROA, renal function improving  - GI following for hepatic encephalopathy, new ascites    Previous Goals:   EN to reach recommended goal of 45 ml/hr in the next 48 hours.   Evaluation: Not met yet but will still be increased w/i timeline  EN at goal to meet % estimated needs.   Evaluation: Not met    Previous Nutrition Diagnosis:   Inadequate protein-energy intake related to unsafe for PO and PTA reduced oral intakes 2/2 mentation as evidenced by intakes <75% x1-2 months, e/o fat and muscle wasting, coding for malnutrition.   Evaluation: Improving, see update below     CURRENT NUTRITION DIAGNOSIS  Inadequate enteral nutrition infusion related to EN started and held at suboptimal rate in setting of dysphagia and NPO diet as evidenced by no PO x2 days or longer, EN running to meet <75% est protein/energy goals.     INTERVENTIONS  Recommendations / Nutrition Prescription  Increase EN now to new goal:     Enteral Regimen: Isosource 1.5 at 45 mL/hr x 24 hours    Total Enteral Provisions: 1080 mL provides 1620 kcal (28 kcal per kg), 73 gm protein (1.3 gm/kg), 190 gm CHO, 15 gm fiber and 821 mL H20.    Meets 100% of DRI's.    Fluid flush per MD (nephrology managing)    Daily weights    Daily lyte check    Diet per MD/SLP    Implementation  EN Composition, EN Schedule, Feeding Tube Flush: EN orders and those associated (as above) entered per scope of practice.  Collaboration and Referral of Nutrition care: discussed change to EN regimen with rounding MD.     Goals  EN at goal to provide % estimated needs.       MONITORING AND EVALUATION:  Progress towards goals will be monitored and evaluated per protocol and Practice Guidelines    Myrtle Seay RD,   [facility] [place], [address], [place]   Pager: [phone]  Weekend Pager: [phone]

## 2019-10-10 NOTE — CONSULTS
"Consult Date:  10/10/2019      PSYCHIATRIC CONSULTATION       REASON FOR CONSULTATION:  Anxiety, depression, alcohol dependence.      REQUESTING PHYSICIAN:  Dudley Harper MD.      PRIMARY CARE PHYSICIAN:  Concepcion Mcintyre DO.       IDENTIFYING DATA:  Radha Turcios is a 59-year-old woman who is  and reports no children.  She has been unemployed since 1988 on account of her anxiety and alcohol use disorder.  She presented to [facility] ED on 10/06/2019 via EMS on account of altered mental status.  She reportedly has become increasingly confused and weak over the preceding 3 days with associated increasing abdominal distention and inability to get out of bed.  She had stopped drinking alcohol on 09/14/2019 after consuming alcoholic beverages on a daily basis for several years.  Psychiatry was consulted to render an opinion on her anxiety, depression and alcohol use disorder.  Information was gathered through direct patient contact through the patient in the presence of her  with her permission.      CHIEF COMPLAINT:  \"Don't take away my alprazolam.\"      HISTORY OF PRESENT ILLNESS:  Radha Turcios reportedly has extensive history of panic disorder, major depressive disorder and alcohol use disorder.  She reportedly was consuming tequila on a daily basis off and on for several years and had always refused to get help to achieve sobriety.  Per her , she has never been through chemical health treatment and had always tried to discontinue use on her own.  He reports that she also had been smoking an average of 2 packs of cigarettes a day for over 15 years with intermittent use of marijuana.  He reports that she is essentially homebound on account of her panic disorder and typically would need to use alprazolam before she can leave the home.  She apparently stopped drinking on 09/14/2019 but prior to that he had noticed that she had become increasingly unable to focus with " associated forgetfulness and impaired memory.  In the days prior to her admission, she had grown increasingly confused and weak with associated inability to empty her bowels or urinate.  He had also observed increasing abdominal distention and had become unable to even get out of bed.  The patient admits that she had been feeling increasingly depressed on account of multiple losses including several family members and this includes her siblings.  She comes from a family of 12 children and she has already lost 6 siblings to a variety of conditions including cancer and complications of alcohol use disorder.      On direct interview, the patient appears somnolent but is arousable.  Her  reports that she has not been able to sleep for several days and has been very anxious about the thought of not being able to take alprazolam.  She admits to neurovegetative symptoms of depression, positive for anhedonia, depressed mood, lack of energy as well as feelings of hopelessness.  Her  reports that she has expressed passive death wishes because she does not feel happy being without family members.  She reportedly has also become anxious about not being able to sleep.  She admits that she has been experiencing auditory hallucinations but is unable to characterize what she experiences.  She does not endorse any active self-harm thoughts, plans or intent.  She denies history consistent with radha and denies previous bouts of psychosis.  She has ever experienced alcohol withdrawal seizures and denies that she has ever been through chemical use treatment.  She denies history consistent with obsessions, compulsions or PTSD.  She denies history suggestive of an eating disorder, ADHD or other psychiatric illnesses.      PAST PSYCHIATRIC HISTORY:  As described in the history of present illness.  Of note, the patient has never been psychiatrically hospitalized.  She receives medication management through her primary care  physician.      CHEMICAL USE HISTORY:  As described above, the patient abuses alcohol and cannabis, but has never been through chemical use treatment.      PAST MEDICAL HISTORY:   1.  Allergic rhinitis.   2.  Gastroesophageal reflux disease.   3.  Hyperlipidemia.   4.  Insomnia.   5.  Macrocytosis without anemia.   6.  Panic disorder with agoraphobia.   7.  Recurrent cold sores.   8.  Tobacco use disorder.      PAST SURGICAL HISTORY:  None reported.      ALLERGIES:  AMOXICILLIN, WHICH CAUSES A RASH.      FAMILY PSYCHIATRIC HISTORY:  There is significant family history of anxiety and depression in addition to alcoholism,      SOCIAL HISTORY:  The patient grew up in [place] as one of her parents' 12 children.  She has lost 6 of her siblings.  Her father  when she just graduated high school.  She does not report a college education.  She last worked in .  She denies DUI or DWI.  She has never been in the .      REVIEW OF SYSTEMS:  I refer the reader to the review of systems documented by Kate Lee DO on 10/07/2019 at 12:53 a.m.      VITAL SIGNS:  Blood pressure 103/74, pulse 100, respirations 24, temperature 98.6, weight 66.1.      MENTAL STATUS EXAMINATION:  This is a middle-aged woman who appears older than her stated age of 59.  She is seen at her bedside in the presence of her  where she is receiving IV fluids as well as feeding via nasogastric tube.  She is somnolent and appears confused.  Her mood is described as depressed and her affect is mood congruent with restricted range of affect.  Her thought process is loosely associated and she rambles.  She endorses the presence of auditory hallucinations of nonspecific voices, but denies any visual hallucinations.  She is oriented to person and place but not to time or situation.  She is noted to have significant abdominal distention.  She is chronically ill-appearing.  Her recall of recent events is marginal.  Remote recall is fair.  Her  muscle strength is weak.  Gait and station were not assessed as she is currently bedbound.  Her associations are concrete.  Her language is appropriate.  She cannot correctly gives today's date or the name of the president, but is able to tell me that she is in a hospital, but cannot give the name of the hospital.  Risk assessment at this time is considered moderate to high.      DIAGNOSTIC IMPRESSION:  Radha Turcios is a 59-year-old  mother of none with established history of alcohol use disorder, panic disorder without agoraphobia and major depressive disorder who presents to the hospital in a delirious state with associated large ascites consistent with hepatic encephalopathy.  She had her ascites tapped in the ED and this was said to have been negative for spontaneous bacterial peritonitis.  She has an NG in situ and is currently on lactulose.  She is worried about ongoing management of her anxiety.  Her  tells me that her primary care physician has been trying to get her off alprazolam without success, but given her current encephalopathy, this would not be a good medication for her to continue regardless of her anxiety; however, in light of her reported auditory hallucinations, I will offer her Zyprexa Zydis as needed to mitigate her insomnia and reported psychosis.      DIAGNOSES:   1.  Delirium due to hepatic disease.   2.  Major depressive disorder, recurrent, severe.   3.  Panic disorder with agoraphobia.   4.  Acute renal failure.   5.  Hypokalemia.   6.  Hypoalbuminemia.    7.  Cirrhosis.   8.  Hyponatremia.      RECOMMENDATIONS:     1.  Medical management as you are.   2.  Avoid all deliriogenic medications including benzodiazepines and narcotics.   3.  Given her hepatic impairment and reported auditory hallucinations, I will offer her Olanzapine Zydis at 2.5 to 5 mg q. 6 hours p.r.n. agitation or psychosis.   4.  Psychiatry may be reconsulted as needed.      Thanks for the consult.          RIGO CHUNG MD             D: 10/10/2019   T: 10/10/2019   MT:       Name:     ESTUARDO JARRETT   MRN:      [id number]        Account:       [id number]   :      1960           Consult Date:  10/10/2019      Document: [id number]       cc: Concepcion Mcintyre DO

## 2019-10-10 NOTE — PROGRESS NOTES
GASTROENTEROLOGY PROGRESS NOTE     SUBJECTIVE: Slightly more coherent today. Tells me she is at [facility]. States year is . Unable to tell me who is President. Denies abdominal pain, nausea or vomiting.      OBJECTIVE:   /58 (BP Location: Right arm)   Pulse 100   Temp 98.6  F (37  C) (Axillary)   Resp (!) 32   Wt 66.1 kg (145 lb 11.2 oz)   LMP 2001   SpO2 96%   BMI 24.82 kg/m     Temp (24hrs), Av.3  F (36.8  C), Min:97.8  F (36.6  C), Max:98.6  F (37  C)     Patient Vitals for the past 72 hrs:   Weight   10/08/19 0400 66.1 kg (145 lb 11.2 oz)        Intake/Output Summary (Last 24 hours) at 10/10/2019 0855  Last data filed at 10/9/2019 2223  Gross per 24 hour   Intake 1167 ml   Output 775 ml   Net 392 ml      PHYSICAL EXAM   Constitutional: Chronically ill appearing, in bed, no acute distress  Cardiovascular: Regular rate and rhythm. No murmurs rubs or gallops  Respiratory: Clear to auscultation bilaterally  Abdomen: Soft, non-tender, mildly distended, normally active bowel sounds. No masses or hepatosplenomegaly appreciated. No guarding or rebound tenderness.    Additional Comments:   ROS, FH, SH: See initial GI consult for details.     I have reviewed the patient's new clinical lab results:   Recent Labs   Lab Test 10/10/19  0831 10/09/19  0537 10/08/19  1257 10/08/19  0532  10/06/19  1852   WBC 9.6 12.8*  --  12.0*   < > 15.3*   HGB 8.1* 8.2* 8.3* 8.1*   < > 7.9*  8.2*   * 104*  --  102*   < > 112*   PLT 56* 57*  --  62*   < > 106*   INR  --  2.01*  --  2.05*  --  2.05*    < > = values in this interval not displayed.      Recent Labs   Lab Test 10/10/19  0831 10/09/19  0537 10/08/19  1257   * 145* 140   POTASSIUM 3.0* 3.5 4.6   CHLORIDE 114* 114* 113*   CO2 PENDING 23 21   BUN PENDING 30 39*   CR PENDING 2.14* 2.79*   ANIONGAP PENDING 8 6   KARLEY PENDING 8.6 8.5      Recent Labs   Lab Test 10/07/19  2220 10/07/19  0612 10/06/19  2053 10/06/19  1852 19  1044  08/10/16  1337    ALBUMIN  --  2.1*  --  1.8* 2.9*   < >  --    BILITOTAL  --  2.0*  --  2.3* 1.0   < >  --    ALT  --  26  --  30 41   < >  --    AST  --  59*  --  60* 137*   < >  --    ALKPHOS  --  86  --  99 170*   < >  --    PROTEIN 30*  --  100*  --   --   --  Negative    < > = values in this interval not displayed.      10/6/19 CT abdomen pelvis without contrast  IMPRESSION:  1. Large amount of ascites and mesenteric edema are likely secondary  to liver failure, given the patient's history. The liver is small  which could be secondary to cirrhosis. No evidence for splenomegaly,  however, to suggest portal venous hypertension.  2. Gallbladder is filled with radiopaque material which could  represent gallstones.  3. Low-attenuation thickening of the wall of the cecum and ascending  colon could represent chronic inflammatory change. Appendix is not  well-seen on this study.  4. Colonic diverticulosis without evidence for acute diverticulitis.  5. Fibroid uterus.  6. Evaluation of solid organs of the abdomen and pelvis is  significantly limited by lack of IV contrast. Evaluation of the hollow  organs is limited by lack of oral contrast.     Assessment: 59-year-old female with a history of alcohol abuse presenting with hepatic encephalopathy and new ascites. Ascites was tapped in the ED and was negative for SBP. NG placed and started on lactulose with gradual improvement of her mentation. FIGUEROA continues to improve. Pt likely has EtOH cirrhosis based on her presentation and labs.     Plan:   -Continue lactulose and nutrition via NG  -Follow hgb and stool output. Blood transfusions per IM  -Follow LFTs and renal function daily  -Eventual EGD to screen for varices, likely can be done as outpatient  -Eventual hepatology clinic follow up. Our office will arrange  -EtOH abstinence  -MN following    ANITHA [facility]  Cell:  [phone] Monday through Friday [id number]  Office: [phone]

## 2019-10-10 NOTE — PROGRESS NOTES
[facility]    Hospitalist Progress Note    Assessment & Plan   59 year old female who presents as transfer from [facility] for hepatic encephalopathy and renal failure, and admitted to [facility] 10/7/2019:    Impression:   Principal Problem:    Hepatic encephalopathy -- improving, NH3 down to 33   -- suspect may have been aggravated by GI bleed   -- discontinue Rifaximin       ARF (acute renal failure) -- improving with hydration   -- suspect related to severe dehydration when obtunded at home      Distal left Clavicle Fx -- appears acute (tender)   -- has left arm sling      Lactic acidosis -- resolved      Hypokalemia -- severe, initially, better now   -- replace as needed       Anemia, probable acute blood loss -- stable on PPI   -- seen by [facility], may have EGD at some point      Coagulopathy -- suspect related to alcoholic liver disease   -- INR 2.01 (unchanged after Vit K)   -- recheck INR in several days      Hypomagnesemia -- related to ETOH use, corrected      Thrombocytopenia -- related to ETOH, possible portal HTN      Acute respiratory failure with hypoxia    -- on Room air today      GI bleed -- suspect UGI bleed given melena, now resolved   -- hgb now stable on PPI   -- [facility] to do EGD at some point (Elective)    Active Problems:    Severe Anxiety/?Agoraphobia   -- Psych consult today      Smoker   -- encouraged smoking cessation       Alcoholic cirrhosis of liver with ascites    -- avoid alcohol         Plan:  Discussed with patient and  today -- continue medical support. .     DVT Prophylaxis: her INR is currently 2 and she is high risk to bleed  Code Status: Full Code     Disposition: Expected discharge ?Friday -- to TCU.     Dudley Willis MD  Pager [phone]  Cell Phone [phone]  Text Page (7am to 6pm)  (total time 35 min)  Interval History   Awake, still confused    Physical Exam   Temp: 97.9  F (36.6  C) Temp src: Oral BP: 117/57 Pulse: 105 Heart Rate: 108  Resp: 30 SpO2: 97 % O2 Device: None (Room air)    Vitals:    10/08/19 0400   Weight: 66.1 kg (145 lb 11.2 oz)     Vital Signs with Ranges  Temp:  [97.9  F (36.6  C)-98.6  F (37  C)] 97.9  F (36.6  C)  Pulse:  [100-106] 105  Heart Rate:  [108] 108  Resp:  [16-32] 30  BP: ()/(55-74) 117/57  SpO2:  [96 %-97 %] 97 %  I/O last 3 completed shifts:  In: 1372 [NG/GT:1297]  Out: 925 [Urine:925]    # Pain Assessment:  Current Pain Score 10/10/2019   Patient currently in pain? denies   - Radha is experiencing discomfort but non-specific. Pain management was discussed and the plan was created in a collaborative fashion.  Radha's response to the current recommendations: confused  - hold sedating medications at this time.     Constitutional: alert, but able to talk, does not appear in discomfort  Respiratory: Clear to auscultation bilaterally, no crackles or wheezing  Cardiovascular: Regular rate and rhythm, normal S1 and S2, and no murmur noted  GI: bowel sounds present, soft, mildly distended consistent with moderate ascites  Extrem: No calf tenderness, no ankle edema  Neuro: more alert, able to talk in sentences now.      Medications     - MEDICATION INSTRUCTIONS -         lactulose  20 g Per NG tube BID     multivitamins w/minerals  15 mL Per Feeding Tube Daily     pantoprazole  40 mg Oral or Feeding Tube BID AC     spironolactone  50 mg Per NG tube BID     thiamine  100 mg Intravenous Daily     traZODone  50 mg Per Feeding Tube At Bedtime       Data   Recent Labs   Lab 10/10/19  1004 10/10/19  0831 10/09/19  0537 10/08/19  1257 10/08/19  0532  10/07/19  0612   WBC  --  9.6 12.8*  --  12.0*   < > 14.2*   HGB  --  8.1* 8.2* 8.3* 8.1*   < > 6.9*   MCV  --  105* 104*  --  102*   < > 107*   PLT  --  56* 57*  --  62*   < > 78*   INR 1.88*  --  2.01*  --  2.05*  --   --    NA  --  145* 145* 140 138   < > 136   POTASSIUM  --  3.0* 3.5 4.6 2.5*   < > 2.8*   CHLORIDE  --  114* 114* 113* 106   < > 103   CO2  --  27 23 21 22   < >  22   BUN  --  21 30 39* 44*   < > 48*   CR  --  1.26* 2.14* 2.79* 3.26*   < > 4.12*   ANIONGAP  --  4 8 6 10   < > 11   KARLEY  --  8.5 8.6 8.5 8.1*   < > 7.3*   GLC  --  118* 137* 92 98   < > 77   ALBUMIN  --  2.2*  --   --   --   --  2.1*   PROTTOTAL  --  6.5*  --   --   --   --  6.5*   BILITOTAL  --  2.6*  --   --   --   --  2.0*   ALKPHOS  --  142  --   --   --   --  86   ALT  --  39  --   --   --   --  26   AST  --  72*  --   --   --   --  59*    < > = values in this interval not displayed.       Imaging:   No results found for this or any previous visit (from the past 24 hour(s)).

## 2019-10-10 NOTE — PLAN OF CARE
Disoriented to situation and time. Speech hard to understand at times. Mentation improving, receiving lactulose. VSS on room air. LS diminished. NG tube in place, 69cm nasal marking. NPO, tube feeding going at 30mL/hr. 200mL flush free water q4h programmed in. Denies pain. Denies nausea. Bruises from fall, small wound on coccyx. Dry/flaky skin. Abd distended/rounded. CT abd showed lg amount of ascites, mesenteric edema s/t liver failure, likely gallbladder stones, diverticulosis, fibroid uterus. Nephrology signed off. Whitney. Sitter in room as pt pulls at lines frequently. . Creat 2.14. Encouraged pt and sitter to call with needs/concerns. Continue to monitor.

## 2019-10-10 NOTE — PLAN OF CARE
Disoriented to time, situation. VSS. Denies any c/o of pain, occasionally kaylie out when moving left arm/should.  Sling in place.  TF increased to 45ml/hr, flush decreased to 100ml q4.  NG placement unchanged. Potassium replaced.  Turned and repo q2hrs, skin bruised, dry.  Incontinent of stool.  Olson patent.  Sitter at bedside due to pt pulling at line.  GI/Pysch following.  Zyprexa added PRN.  TCU at discharge. Will continue to monitor.

## 2019-10-10 NOTE — PLAN OF CARE
Discharge Planner OT   Patient plan for discharge: Not stated  Current status: pt required encouragement for participation in OT session, pt max A supine to sit EOB, pt c/o dizziness, /63. Pt max encouragement to attempt standing, pt mod A of 2 sit to stand, pt with cues and min/mod A of 2 able to side step up toward HOB with FWW. Max A of 2 to complete sit to supine and reposition up in bed for comfort. Transfer completed slowly as pt c/o pain/discomfort when touching legs and back behind shoulders.   Barriers to return to prior living situation: Level of assist with all ADL and mobility, cognition, medical status  Recommendations for discharge: TCU per plan established by the Occupational Therapist  Rationale for recommendations: Would anticipate that patient may have a lengthy course of rehab; would benefit from inpatient rehab and TCU to maximize independence with ADLS.        Entered by: Lanette Fair 10/10/2019 3:32 PM

## 2019-10-10 NOTE — PLAN OF CARE
"Discharge Planner PT   Patient plan for discharge: did not state  Current status: Pt received supine in bed, agreeable to PT, though states she feels tired, states \"it is what it is.\" Family in room and supportive. Engaged in LE strengthening, pt with some difficulty following instructions, but able to perform with frequent simple cues and demo. Supine>sit with HOB elevated and ModAx2, scooted to EOB with MaxAx2. Sit>stand with FWW and Bon with CGA of 2nd person. Pt able to march in place at EOB. Ambulated 8' in room around bed, with Bon x2, pt reports dizziness, able to ambulate and sit at EOB with MinAx2 including assist to manage walker. BP sitting post ambulation 99/55, supine 121/57- taken at LE supine due to pt pain/intolerance of BP at arm. Sit>supine with MaxA x2, tube feed held to scoot up in bed with MaxAx2, pt able to push through LE minimally to assist. Pt supine in bed upon departure, call light in reach, sitter and family in room.     Barriers to return to prior living situation: level of A for mob, fall risk, limited toya for activity, decreased strength and balance, impaired cog  Recommendations for discharge: TCU  Rationale for recommendations: Pt will benefit from cont therapies to improve above deficits and progress functional mob I and safety to hopefully allow return to home.       Entered by: Phuong Madden 10/10/2019 12:04 PM       "

## 2019-10-10 NOTE — PROGRESS NOTES
10/10/19 0928   General Information   Onset Date 10/07/19   Start of Care Date 10/10/19   Referring Physician Dr. Harper   Patient Profile Review/OT: Additional Occupational Profile Info See Profile for full history and prior level of function   Patient/Family Goals Statement Patient did not state.    Swallowing Evaluation Bedside swallow evaluation   Behaviorial Observations Alert;Confused   Mode of current nutrition [place]   Respiratory Status Room air   Comments 59 year old female who presents as transfer from [facility] for hepatic encephalopathy and renal failure, and admitted to [facility] 10/7/2019. No documented history of oral/pharyngeal dysphagia, but has GERDS and suspect esophageal dysfunction.    Clinical Swallow Evaluation   Oral Musculature generally intact   Structural Abnormalities none present   Dentition present and adequate   Mucosal Quality dry   Mandibular Strength and Mobility impaired   Oral Labial Strength and Mobility impaired retraction;impaired pursing   Lingual Strength and Mobility impaired protrusion;impaired anterior elevation;impaired left lateral movement;impaired right lateral movement   Velar Elevation impaired   Buccal Strength and Mobility impaired   Laryngeal Function Throat clear;Swallow;Voicing initiated;Dry swallow palpated  (Soft voice and weak cough. )   Oral Musculature Comments Moderately impaired by generalized weaknees.    Additional Documentation Yes   Swallow Eval   Feeding Assistance dependent   Clinical Swallow Eval: Thin Liquid Texture Trial   Mode of Presentation, Thin Liquids spoon;straw;fed by clinician   Volume of Liquid or Food Presented Ice chips and 4 sips of water via the straw   Oral Phase of Swallow Premature pharyngeal entry   Pharyngeal Phase of Swallow impaired;reduction in laryngeal movement;repeated swallows   Diagnostic Statement No immediate Sx of aspiration but can not rule out silent aspiration due to increased effort with breathing.    Clinical  Swallow Eval: Puree Solid Texture Trial   Mode of Presentation, Puree spoon;fed by clinician   Volume of Puree Presented 2 1/2 teaspoons of pudding   Oral Phase, Puree Residue in oral cavity   Oral Residue, Puree soft palate   Pharyngeal Phase, Puree impaired;reduction in laryngeal movement;repeated swallows;throat clearing   Diagnostic Statement Delayed cough after the trials.   Swallow Compensations   Swallow Compensations Alternate viscosity of consistencies;Effortful swallow;Pacing;Reduce amounts;Multiple swallow   Results Suspect silent aspiration;Oral difficulties only   Esophageal Phase of Swallow   Patient reports or presents with symptoms of esophageal dysphagia Yes   Esophageal comments GERDS on PPI.   General Therapy Interventions   Planned Therapy Interventions Dysphagia Treatment   Dysphagia treatment Oropharyngeal exercise training   Swallow Eval: Clinical Impressions   Skilled Criteria for Therapy Intervention Skilled criteria met.  Treatment indicated.   Functional Assessment Scale (FAS) 3   Treatment Diagnosis Moderate oral and pharyngeal dysphagia   Diet texture recommendations NPO  (Except for ice chips.)   Therapy Frequency Daily   Predicted Duration of Therapy Intervention (days/wks) 1 week   Anticipated Discharge Disposition inpatient rehabilitation facility   Risks and Benefits of Treatment have been explained. Yes   Patient, family and/or staff in agreement with Plan of Care Yes   Clinical Impression Comments Patient presents with moderate oral and pharyngeal dysphagia at bedside secondary to generalized weakness and decreased cognition. Oral motor function was moderately impaired for labial/lingual ROM and weak voice and cough for airway protection. Patient was given trials of single small ice chips and demonstrated mildly reduced bolus control and premature entry without Sx of aspiration. Few swallows of water via the straw there was premature entry and then noted to have increased effort  in breathing and increased RR without changes in her O2 saturations, suspect silent aspiration. 2 1/2 teaspoons of pudding given with mildly reduced AP transport and minimal to mild oral residue on the soft palate. Patient continues to be a high risk for silent aspiration given increased RR with PO trials and impaired cognition. Recommend: 1. Continue NPO with TF. 2. May have small amount of single ice chips for comfort and to improve swallow function.    Total Evaluation Time   Total Evaluation Time (Minutes) 18

## 2019-10-10 NOTE — PLAN OF CARE
Discharge Planner SLP   Patient plan for discharge: Not addressed.   Current status: Bedside swallow evaluation completed per MD orders. Patient presents with moderate oral and pharyngeal dysphagia at bedside secondary to generalized weakness and decreased cognition. Oral motor function was moderately impaired for labial/lingual ROM and weak voice and cough for airway protection. Patient was given trials of single small ice chips and demonstrated mildly reduced bolus control and premature entry without Sx of aspiration. Few swallows of water via the straw there was premature entry and then noted to have increased effort in breathing and increased RR without changes in her O2 saturations, suspect silent aspiration. 2 1/2 teaspoons of pudding given with mildly reduced AP transport and minimal to mild oral residue on the soft palate. Patient continues to be a high risk for silent aspiration given increased RR with PO trials and impaired cognition.     Recommend: 1. Continue NPO with TF. 2. May have small amount of single ice chips for comfort and to improve swallow function. 1-10 per hour hold if changes in her respiratory status.     Barriers to return to prior living situation: Acuity of her illness.   Recommendations for discharge: TCU  Rationale for recommendations: May need continued ST needs for dysphagia management pending progress made as an IP.        Entered by: Valerie Jean-Baptiste 10/10/2019 9:48 AM

## 2019-10-11 NOTE — PLAN OF CARE
DATE & TIME: 10/11/19 0700  - 1930         Cognitive Concerns/ Orientation : Alert to self  BEHAVIOR & AGGRESSION TOOL COLOR: Green  CIWA SCORE: n/a  ABNL VS/O2: Pulse 110s, soft BPs  MOBILITY: turn and repo  Q2  PAIN MANAGMENT: Denies  DIET: NPO, ice, TF at 45cc/ hr with 200cc flushes q 4hrs  BOWEL/BLADDER: Olson in place. Incontinent of stool  ABNL LAB/BG: K+4.4,Mag 1.4, Phos 0.6, replaced both.,  IN/DEVICES: Olson, PIV SL. NG tube. Tube feeding at 45ml/hr, 200ml programmed flush q4hrs.  TELEMETRY RHYTHM:SR  SKIN:  blanchable Redness to coccyx, applied meplix, multiple bruises all over   TESTS/PROCEDURES: had US guided paracentesis  D/C DAY/GOALS/PLACE: Pending  OTHER IMPORTANT INFO: GI following. Pt. Anxious  at times,PT/OT/Speech and GI  following., had US guided  Paracentesis, pulled 2.8L out,  left abdomen  dressing c/d i

## 2019-10-11 NOTE — PROGRESS NOTES
[facility]    Hospitalist Progress Note    Assessment & Plan   59 year old female who presents as transfer from [facility] [place] for hepatic encephalopathy and renal failure, and admitted to [facility] 10/7/2019:    Impression:   Principal Problem:    Hepatic encephalopathy -- improving, NH3 down to 33   -- suspect may have been aggravated by GI bleed   -- continue scheduled lactulose       Severe Hypophosphatemia -- related to refeeding   -- replace IV, and then Neutraphos per NG      Hypomagnesemia -- relate to drinking and refeeding    -- replacing       ARF (acute renal failure) -- resolved with hydration      Distal left Clavicle Fx -- appears acute (tender)   -- has left arm sling      Lactic acidosis -- resolved      Hypokalemia -- resolved with replacement      Anemia, probable acute blood loss -- stable on PPI   -- seen by [facility], may have EGD at some point      Coagulopathy -- suspect related to alcoholic liver disease   -- INR 2.01 (unchanged after Vit K)   -- recheck INR tomorrow      Thrombocytopenia -- related to ETOH, possible portal HTN      Acute respiratory failure with hypoxia -- resolved      GI bleed -- suspect UGI bleed given melena, now resolved   -- hgb now stable on PPI   -- [facility] to do EGD at some point (probably as outpatient)    Active Problems:    Severe Anxiety/?Agoraphobia -- psych saw patient   -- Trazodone 50 mg at bedtime   -- Zyprexa prn agitation or hallucinations       Smoker -- encouraged smoking cessation       Alcoholic cirrhosis of liver with ascites    -- avoid alcohol    -- starting Spironolactone 25 mg bid        Plan:  Discussed with patient and  today -- continue medical support. .     DVT Prophylaxis: her INR is currently 2 and she is high risk to bleed  Code Status: Full Code     Disposition: Expected discharge ?Monday -- to TCU.     Dudley Willis MD  Pager [phone]  Cell Phone [phone]  Text Page (7am to 6pm)  Interval History   Awake,  still confused    Physical Exam   Temp: 99  F (37.2  C) Temp src: Oral BP: 98/61 Pulse: 108 Heart Rate: 97 Resp: 20 SpO2: 94 % O2 Device: None (Room air)    Vitals:    10/08/19 0400   Weight: 66.1 kg (145 lb 11.2 oz)     Vital Signs with Ranges  Temp:  [98.1  F (36.7  C)-100.2  F (37.9  C)] 99  F (37.2  C)  Pulse:  [108-115] 108  Heart Rate:  [] 97  Resp:  [20-40] 20  BP: ()/(50-61) 98/61  SpO2:  [91 %-99 %] 94 %  I/O last 3 completed shifts:  In: 2758 [NG/GT:2758]  Out: 1300 [Urine:1300]    # Pain Assessment:  Current Pain Score 10/11/2019   Patient currently in pain? denies   - Radha is experiencing discomfort but non-specific. Pain management was discussed and the plan was created in a collaborative fashion.  Radha's response to the current recommendations: confused  - hold sedating medications at this time.     Constitutional: alert, but able to talk, does not appear in discomfort  Respiratory: Clear to auscultation bilaterally, no crackles or wheezing  Cardiovascular: Regular rate and rhythm, normal S1 and S2, and no murmur noted  GI: bowel sounds present, soft, mildly distended consistent with moderate ascites  Extrem: No calf tenderness, no ankle edema  Neuro: more alert, still rarely makes sense when talking.      Medications     - MEDICATION INSTRUCTIONS -         lactulose  20 g Per NG tube TID     magnesium sulfate  4 g Intravenous Once     multivitamins w/minerals  15 mL Per Feeding Tube Daily     pantoprazole  40 mg Oral or Feeding Tube BID AC     potassium & sodium phosphates  2 packet Per NG tube TID     rifaximin  550 mg Oral or NG Tube BID     spironolactone  25 mg Per NG tube BID     thiamine  100 mg Intravenous Daily     traZODone  50 mg Per Feeding Tube At Bedtime       Data   Recent Labs   Lab 10/11/19  0813 10/10/19  1004 10/10/19  0831 10/09/19  0537  10/08/19  0532  10/07/19  0612   WBC 9.8  --  9.6 12.8*  --  12.0*   < > 14.2*   HGB 7.8*  --  8.1* 8.2*   < > 8.1*   < > 6.9*   MCV  106*  --  105* 104*  --  102*   < > 107*   PLT 67*  --  56* 57*  --  62*   < > 78*   INR  --  1.88*  --  2.01*  --  2.05*  --   --    *  --  145* 145*   < > 138   < > 136   POTASSIUM 4.4  --  3.0* 3.5   < > 2.5*   < > 2.8*   CHLORIDE 119*  --  114* 114*   < > 106   < > 103   CO2 26  --  27 23   < > 22   < > 22   BUN 17  --  21 30   < > 44*   < > 48*   CR 1.03  --  1.26* 2.14*   < > 3.26*   < > 4.12*   ANIONGAP 3  --  4 8   < > 10   < > 11   KARLEY 8.4*  --  8.5 8.6   < > 8.1*   < > 7.3*   *  --  118* 137*   < > 98   < > 77   ALBUMIN  --   --  2.2*  --   --   --   --  2.1*   PROTTOTAL  --   --  6.5*  --   --   --   --  6.5*   BILITOTAL  --   --  2.6*  --   --   --   --  2.0*   ALKPHOS  --   --  142  --   --   --   --  86   ALT  --   --  39  --   --   --   --  26   AST  --   --  72*  --   --   --   --  59*    < > = values in this interval not displayed.       Imaging:   Recent Results (from the past 24 hour(s))   US Paracentesis initial: high volume    Narrative    ULTRASOUND PARACENTESIS 10/11/2019 1:35 PM     HISTORY: Ascites. Therapeutic (high volume). Paracentesis with fluid  analysis.    FINDINGS: Ultrasound was used to evaluate for the presence and best  approach for paracentesis. Written and oral informed consent was  obtained. A pause for the cause procedure to verify the correct  patient and correct procedure. The skin overlying the right lower  quadrant was prepped and draped in the usual sterile fashion. The  subcutaneous tissues were anesthetized with 10 mL 1% Lidocaine. A  catheter was advanced into the peritoneal space and 2.8 L of  straw  colored fluid was drained. There were no immediate complications.  Ultrasound images were permanently stored.  Patient left the  ultrasound suite in satisfactory condition.      Impression    IMPRESSION: Technically successful paracentesis without immediate  complications.    OSVALDO BOB MD

## 2019-10-11 NOTE — PLAN OF CARE
DATE & TIME: 10/11/19 6469-9556            Cognitive Concerns/ Orientation : Alert to self  BEHAVIOR & AGGRESSION TOOL COLOR: Green  CIWA SCORE: n/a  ABNL VS/O2: Pulse 110s, Resps 20-40, soft BPs  MOBILITY: Up with 2 and gb, Turn and repo Q2  PAIN MANAGMENT: Denies  DIET: NPO, ice  BOWEL/BLADDER: Olson in place. Incontinent of stool  ABNL LAB/BG: K+ 3.0, replaced; AM recheck   DRAIN/DEVICES: PIV SL. NG tube. Tube feeding at 45ml/hr, 100ml programmed flush q4hrs.  TELEMETRY RHYTHM: Sinus Tach  SKIN: Redness, bruised, wound  TESTS/PROCEDURES: n/a  D/C DAY/GOALS/PLACE: Pending  OTHER IMPORTANT INFO: GI following. Pt. Anxious and not sleeping, resps at 40, hospitalist paged, PRN Olanzapine 2.5mg given x1 this shift. PT/OT/Speech following.

## 2019-10-11 NOTE — PROGRESS NOTES
MD Notification    Notified Person: MD    Notified Person Name: Dr. Menamonoluanne    Notification Date/Time: 10/11/19 0328    Notification Interaction: Telephone    Purpose of Notification: Pt experiencing anxiety.   Vitals:    Temp: 99.2  Resp: 40  Pulse: 115  BP: 100/53    Orders Received: Give PRN Olanzapine 2.5mg     Comments:

## 2019-10-11 NOTE — PROGRESS NOTES
Care Suites Procedure Nursing Note    Procedure: US guided paracentesis  Procedure started time: 1307  Procedure completed time: 1321  Concerns/abnormal assessment: Total of 2800 olga fluid drained from right side of abdomen. Pt tolerated well. VSS  Plan: Transport back to room.

## 2019-10-11 NOTE — PLAN OF CARE
DATE & TIME: 10/10/19 7091-7647   Cognitive Concerns/ Orientation : Alert to self  BEHAVIOR & AGGRESSION TOOL COLOR: Green  CIWA SCORE: n/a  ABNL VS/O2: VSS on RA, tachy at times  MOBILITY: Up with 2 and gb, Turn and repo  PAIN MANAGMENT: Denies  DIET: NPO, ice  BOWEL/BLADDER: Olson in place. Incontinent of stool  ABNL LAB/BG: Hgb 8.1 Na 145 K+ 3.0, replaced AM recheck Creat 1.26 INR 1.88  DRAIN/DEVICES: PIV SL. NG tube  TELEMETRY RHYTHM: Sinus Tach  SKIN: Redness, bruised, wound  TESTS/PROCEDURES: n/a  D/C DAY/GOALS/PLACE: Pending  OTHER IMPORTANT INFO: GI following. Tube feeding at 45ml/hr, 100ml flush q4hrs.

## 2019-10-11 NOTE — PLAN OF CARE
Discharge Planner SLP   Patient plan for discharge: Not stated this date.   Current status: Swallow Tx provided this AM. SLP re-assessed tolerance of thin and nectar thick liquids via tsp. Pt demonstrated weak cough response secondary to initial single ice chip with no additional cough responses noted across additional x2 trials. Subtle throat clear noted with nectar thick liquid by tsp. Pt exhibited increased effort of breathing during and following trials, which she acknowledged when questioned. Pt able to complete x2 oropharyngeal exercises given max cues with notable fatigue observed as trials progressed. Pt continues to demonstrate reduced lingual/labial ROM, strength and coordination impacting safety of swallow function.     Recommend continuation of NPO. Pt has TF running. Pt able to have single ice chips (1-10 per hour) when positioned upright and alert. Hold ice chips if s/sx of aspiration present.   Barriers to return to prior living situation: Acuity of illness.   Recommendations for discharge: TCU  Rationale for recommendations: Skilled swallow tx indicated at next level to manage dysphagia in alignment with increased risk of aspiration.        Entered by: Melinda Gtz 10/11/2019 8:44 AM

## 2019-10-11 NOTE — CONSULTS
Care Transition Initial Assessment - SW     Met with: Patient and Family    Principal Problem:    Hepatic encephalopathy (H)  Active Problems:    Anxiety    Smoker    Alcoholic cirrhosis of liver with ascites (H)    ARF (acute renal failure) (H)    Distal left Clavicle Fracture    Lactic acidosis    Hypokalemia    Anemia    Coagulopathy (H)    Hypomagnesemia    Thrombocytopenia (H)    Acute respiratory failure with hypoxia (H)    GI bleed       DATA  Lives With: spouse   Living Arrangements: apartment  Quality of Family Relationships: supportive, involved  Description of Support System: Supportive, Involved  Who is your support system?: , Sibling(s)  Support Assessment: Adequate family and caregiver support.   Identified issues/concerns regarding health management:      Quality of Family Relationships: supportive, involved  Transportation Anticipated: family or friend will provide    ASSESSMENT  Cognitive Status:  awake  Concerns to be addressed: discharge planning.    SW received and reviewed consult order for discharge planning. Reviewed notes. SW met with patient and her , and sister to discuss discharge planning. Patient was admitted on 10/7/19 due to hepatic encephalopathy. Prior to admission, patient reports she was independent in all ADLs and IADLs but no longer drives. Patient lives with  in apartment on the main level, there is a step down into their living room. Patient and family provided list of TCU locations - patient and  prefer [facility], [facility] and [facility]. Patient and  in agreement of private room rate. Patient currently has a sitter at this time, referrals will not be sent until no longer has sitter.      PLAN  Financial costs for the patient includes NA.  Patient given options and choices for discharge TCU.  Patient/family is agreeable to the plan?  Yes:   Transportation/person available to transport on day of discharge  is TBD and have they been notified/set up  TBD  Patient Goals and Preferences: safe discharge.  Patient anticipates discharging to:  TCU.    SW to assist with safe discharge planning.        PAULY Wood

## 2019-10-12 NOTE — PROGRESS NOTES
[facility]    Hospitalist Progress Note    Assessment & Plan   59 year old female who presents as transfer from [facility] for hepatic encephalopathy and renal failure, and admitted to [facility] 10/7/2019:    Impression:   Principal Problem:    Hepatic encephalopathy -- improving, NH3 down to 33, but now 60 with tube feedings   -- suspect may have been aggravated by GI bleed   -- continue scheduled lactulose    -- more alert, will go with oral intake and remove NG      Severe Hypophosphatemia -- related to refeeding   -- corrected, stop replacement      Hypomagnesemia -- relate to drinking and refeeding    -- replacing       ARF (acute renal failure) -- resolved with hydration      Distal left Clavicle Fx -- appears acute (tender)   -- has left arm sling      Lactic acidosis -- resolved      Hypokalemia -- resolved with replacement      Anemia, probable acute blood loss -- stable on PPI   -- seen by [facility], may have EGD at some point      Coagulopathy -- suspect related to alcoholic liver disease   -- INR 2.01 (unchanged after Vit K)   -- recheck INR tomorrow      Thrombocytopenia -- related to ETOH, possible portal HTN      Acute respiratory failure with hypoxia -- resolved      GI bleed -- suspect UGI bleed given melena, now resolved   -- hgb now stable on PPI   -- [facility] to do EGD at some point (probably as outpatient)    Active Problems:    Severe Anxiety/?Agoraphobia -- psych saw patient   -- Trazodone 50 mg at bedtime   -- Zyprexa prn agitation or hallucinations       Smoker -- encouraged smoking cessation       Alcoholic cirrhosis of liver with ascites    -- avoid alcohol    -- starting Spironolactone 25 mg bid        Plan:  Discussed with patient and  today -- continue medical support. .     DVT Prophylaxis: her INR is currently 2 and she is high risk to bleed  Code Status: Full Code     Disposition: Expected discharge ?Monday -- to TCU.     Dudley Willis MD  Pager  [phone]  Cell Phone [phone]  Text Page (7am to 6pm)  Interval History   Awake, still confused    Physical Exam   Temp: 99.2  F (37.3  C) Temp src: Oral BP: 107/67 Pulse: 103 Heart Rate: 106 Resp: 18 SpO2: 95 % O2 Device: None (Room air)    Vitals:    10/08/19 0400 10/12/19 0424   Weight: 66.1 kg (145 lb 11.2 oz) 64.4 kg (141 lb 15.6 oz)     Vital Signs with Ranges  Temp:  [98.6  F (37  C)-99.4  F (37.4  C)] 99.2  F (37.3  C)  Pulse:  [103-106] 103  Heart Rate:  [] 106  Resp:  [18-20] 18  BP: ()/(57-67) 107/67  SpO2:  [93 %-95 %] 95 %  I/O last 3 completed shifts:  In: 2151 [I.V.:250; NG/GT:1901]  Out: 1100 [Urine:1100]    # Pain Assessment:  Current Pain Score 10/12/2019   Patient currently in pain? denies   - Radha is experiencing discomfort but non-specific. Pain management was discussed and the plan was created in a collaborative fashion.  Radha's response to the current recommendations: confused  - hold sedating medications at this time.     Constitutional: alert, but able to talk, does not appear in discomfort  Respiratory: Clear to auscultation bilaterally, no crackles or wheezing  Cardiovascular: Regular rate and rhythm, normal S1 and S2, and no murmur noted  GI: bowel sounds present, soft, mildly distended consistent with moderate ascites  Extrem: No calf tenderness, no ankle edema  Neuro: more alert, still rarely makes sense when talking.      Medications     - MEDICATION INSTRUCTIONS -         lactulose  20 g Per NG tube TID     multivitamins w/minerals  15 mL Per Feeding Tube Daily     pantoprazole  40 mg Oral or Feeding Tube BID AC     potassium & sodium phosphates  2 packet Per NG tube TID     rifaximin  550 mg Oral or NG Tube BID     spironolactone  25 mg Per NG tube BID     thiamine  100 mg Intravenous Daily     traZODone  50 mg Per Feeding Tube At Bedtime       Data   Recent Labs   Lab 10/12/19  1412 10/11/19  0813 10/10/19  1004 10/10/19  0831 10/09/19  0537   WBC 13.2* 9.8  --   9.6 12.8*   HGB 8.4* 7.8*  --  8.1* 8.2*   * 106*  --  105* 104*   PLT 75* 67*  --  56* 57*   INR 1.65*  --  1.88*  --  2.01*    148*  --  145* 145*   POTASSIUM 3.7 4.4  --  3.0* 3.5   CHLORIDE 112* 119*  --  114* 114*   CO2 24 26  --  27 23   BUN 17 17  --  21 30   CR 0.88 1.03  --  1.26* 2.14*   ANIONGAP 6 3  --  4 8   KARLEY 7.6* 8.4*  --  8.5 8.6   * 113*  --  118* 137*   ALBUMIN 2.1*  --   --  2.2*  --    PROTTOTAL 6.7*  --   --  6.5*  --    BILITOTAL 1.5*  --   --  2.6*  --    ALKPHOS 174*  --   --  142  --    ALT 38  --   --  39  --    AST 70*  --   --  72*  --      Phos 3.6  Ammonia 60    Imaging:   No results found for this or any previous visit (from the past 24 hour(s)).

## 2019-10-12 NOTE — PLAN OF CARE
DATE & TIME: 10/12 0700- 1930     Cognitive Concerns/ Orientation : Oriented to self   BEHAVIOR & AGGRESSION TOOL COLOR: Green     ABNL VS/O2:  99.4, tachycardic and tachypnic, HR in 100s, RR in 20s  MOBILITY: Total, reposition Q2H  PAIN MANAGMENT:  No pain  DIET:  CL, TF at 45/hr, 200ml flush Q4H  BOWEL/BLADDER: Olson patent, incontinent of stool  ABNL LAB/BG: WBC 13.2, MG 1.9, Phos 3.6  DRAIN/DEVICES: IV SL, Keofeed  SKIN: yellow, bruised, paracentesis site CDI  TESTS/PROCEDURES: none  D/C DAY/GOALS/PLACE: pending  OTHER IMPORTANT INFO:  seems more alert today, family at bed side, left clavicle fx, uses sling for left arm, LS crackles bases, plan  to advance diet as tolerated,orders to clamp  NG after current bag and  pull NG out if tolerates diet, discontinued sitter.

## 2019-10-12 NOTE — PLAN OF CARE
Discharge Planner SLP   Patient plan for discharge: did not state  Current status: Per RN/family in room pt more alert/aware within the past hour. Voice is stronger, pt able to produce stronger cough and demonstrate strong effortful swallow to assist in managing secretions within throat. Trials of thin liquids (tsp, cup, straw) and nectar thick liquids (tsp, cup) completed. Mildly reduced control/swallow delay with thin liquids, more timely with nectar however no s/s aspiration across consistencies. SP02 remained 96-95%. Vocal quality clear. Pt and family educated on s/s aspiration/dysphagia to closely monitor for and to notify RN staff if they occur, educated on risk and possible complications of aspiration - they verbalized understanding of all.     Recommendations:  1) initiate clear liquids, thin liquids if OK with MD (text paged him for diet initiation)  2) downgrade to nectar thick liquids if any increased difficulty with thin  3) only allow PO when fully alert, responsive/appropriate - hold if not  4) pt must be fully upright, slow pace, small single sips  5) closely monitor temps, lungs, s/s aspiration and discontinue PO if any concerns for aspiration arise    Barriers to return to prior living situation: Acuity of illness, below baseline  Recommendations for discharge: TCU  Rationale for recommendations: Ongoing SLP for dysphagia management to maximize swallow safety with least restrictive diet        Entered by: Radha Bear 10/12/2019 12:03 PM

## 2019-10-12 NOTE — PROGRESS NOTES
GASTROENTEROLOGY PROGRESS NOTE     SUBJECTIVE: Still some confusion, but family reports much more alert. Conversant. Denies pain, nausea or vomiting     OBJECTIVE:   /67 (BP Location: Right arm)   Pulse 103   Temp 99.3  F (37.4  C) (Oral)   Resp 18   Wt 64.4 kg (141 lb 15.6 oz)   LMP 2001   SpO2 95%   BMI 24.18 kg/m     Temp (24hrs), Av.7  F (37.1  C), Min:97.9  F (36.6  C), Max:100.2  F (37.9  C)     Patient Vitals for the past 72 hrs:   Weight   10/12/19 0424 64.4 kg (141 lb 15.6 oz)        Intake/Output Summary (Last 24 hours) at 10/11/2019 0839  Last data filed at 10/11/2019 0638  Gross per 24 hour   Intake 2963 ml   Output 1350 ml   Net 1613 ml      PHYSICAL EXAM   Constitutional: Older appearing than stated age, in bed, no acute distress  Cardiovascular: Regular rate and rhythm. No murmurs rubs or gallops  Respiratory: Clear to auscultation bilaterally  Abdomen: Soft, non-tender, mildly distended, normally active bowel sounds. No masses or hepatosplenomegaly appreciated. No guarding or rebound tenderness.  Neuro: slight asterixis, oriented to person (not time or location), but mostly appropriate responses, conversant  Additional Comments:   ROS, FH, SH: See initial GI consult for details.     I have reviewed the patient's new clinical lab results:   Recent Labs   Lab Test 10/11/19  0813 10/10/19  1004 10/10/19  0831 10/09/19  0537  10/08/19  0532   WBC 9.8  --  9.6 12.8*  --  12.0*   HGB 7.8*  --  8.1* 8.2*   < > 8.1*   *  --  105* 104*  --  102*   PLT 67*  --  56* 57*  --  62*   INR  --  1.88*  --  2.01*  --  2.05*    < > = values in this interval not displayed.      Recent Labs   Lab Test 10/11/19  0813 10/10/19  0831 10/09/19  0537   * 145* 145*   POTASSIUM 4.4 3.0* 3.5   CHLORIDE 119* 114* 114*   CO2 26 27 23   BUN 17 21 30   CR 1.03 1.26* 2.14*   ANIONGAP 3 4 8   KARLEY 8.4* 8.5 8.6      Recent Labs   Lab Test 10/10/19  0831 10/07/19  2220 10/07/19  0612 10/06/19  2053  10/06/19  1852  08/10/16  1337   ALBUMIN 2.2*  --  2.1*  --  1.8*   < >  --    BILITOTAL 2.6*  --  2.0*  --  2.3*   < >  --    ALT 39  --  26  --  30   < >  --    AST 72*  --  59*  --  60*   < >  --    ALKPHOS 142  --  86  --  99   < >  --    PROTEIN  --  30*  --  100*  --   --  Negative    < > = values in this interval not displayed.      10/6/19 CT abdomen pelvis without contrast  IMPRESSION:  1. Large amount of ascites and mesenteric edema are likely secondary  to liver failure, given the patient's history. The liver is small  which could be secondary to cirrhosis. No evidence for splenomegaly,  however, to suggest portal venous hypertension.  2. Gallbladder is filled with radiopaque material which could  represent gallstones.  3. Low-attenuation thickening of the wall of the cecum and ascending  colon could represent chronic inflammatory change. Appendix is not  well-seen on this study.  4. Colonic diverticulosis without evidence for acute diverticulitis.  5. Fibroid uterus.  6. Evaluation of solid organs of the abdomen and pelvis is  significantly limited by lack of IV contrast. Evaluation of the hollow  organs is limited by lack of oral contrast.     Assessment: 59-year-old female with a history of alcohol abuse presenting with hepatic encephalopathy and new ascites. Suspected EtOH cirrhosis.   Ascites was tapped in the ED and was negative for SBP. NG placed and started on lactulose with gradual improvement of her mentation. FIGUEROA has been improving. No labs available yet today.   Pt continues to be confused, but does seem to finally be having some significant improvement.     Plan:   -Continue lactulose and nutrition via NG.  -Lactulose TID, goal of 3-4 stools per day, continue Rifaximin  -Follow hgb and stool output. Blood transfusions per IM  -Follow LFTs and renal function daily  -Eventual EGD to screen for varices, likely can be done as outpatient  -Eventual hepatology clinic follow up. Our office will  arrange  -EtOH abstinence    Nick Yeung MD  [facility]  Office: [phone]

## 2019-10-12 NOTE — PLAN OF CARE
DATE & TIME: 10/11 7P-7A                          Cognitive Concerns/ Orientation : Oriented to self   BEHAVIOR & AGGRESSION TOOL COLOR: Green     ABNL VS/O2:  99.4, tachycardic and tachypnic,  Room air  MOBILITY: Total, reposition Q2H  PAIN MANAGMENT:  No pain  DIET:  NPO, TF at 45/hr, 200ml flush Q4H  BOWEL/BLADDER: Olson patent, incontinent of loose stool  ABNL LAB/BG: Phos 2.3 - replaced, recheck in AM, Hbg 7.8, Plt 67  DRAIN/DEVICES: IV SL, Keofeed  SKIN: yellow, bruised, paracentesis site CDI  TESTS/PROCEDURES:   D/C DAY/GOALS/PLACE: pending  OTHER IMPORTANT INFO:  Fine crackles in bilateral bases, Left arm sling in place, abdomen distended

## 2019-10-12 NOTE — PLAN OF CARE
DATE/TIME 10/12/19 9235-9800    COGNITIVE/ORIENTATION oriented x2 to self and place, more alert this afternoon  BEHAVIOR/AGGRESSION green  CIWA SCORE n/a  ABNL VS/O2 tachycardia, tachypnea, low grade fever (99.3F oral)  MOBILITY dependent, turn and change q2h  PAIN MANAGE denies  DIET advanced by speech to clear thin liquids, tolerating; continuous tube feedings 45 mL/hr, flush 200 mL q4h  BOWEL/BLADDER joiner catheter, patent; incontinent loose stools  ABNL LAB/BG ordered, results pending  DRAIN/DEVICES Right PIV, saline locked; NG tube on continuous; sling left arm  TELEMETRY RHYTHM n/a  SKIN slightly jaundiced, bruising, foam dressing to coccyx over pencil eraser size opening with surrounding blanchable redness  TEST/PROCEDURE paracentesis (10/11), dressing covering CDI  discharge DAY/GOAL/PLACE TCU referrals will be sent out once patient off sitter  OTHER IMPORTANT INFO sitter dc'd this shift,  at bedside

## 2019-10-13 NOTE — PROGRESS NOTES
[facility]    Hospitalist Progress Note    Assessment & Plan   59 year old female who presents as transfer from [facility] for hepatic encephalopathy and renal failure, and admitted to [facility] 10/7/2019:    Impression:   Principal Problem:    Hepatic encephalopathy -- clinically better today ... finally       Severe Hypophosphatemia -- related to refeeding   -- corrected, stop replacement      Hypomagnesemia -- relate to drinking and refeeding    -- replacing       ARF (acute renal failure) -- resolved with hydration      Distal left Clavicle Fx -- appears acute (tender)   -- has left arm sling      Lactic acidosis -- resolved      Hypokalemia -- resolved with replacement      Anemia, probable acute blood loss -- stable on PPI   -- seen by [facility], may have EGD at some point      Coagulopathy -- suspect related to alcoholic liver disease   -- INR 2.01 (unchanged after Vit K)   -- recheck INR tomorrow      Thrombocytopenia -- related to ETOH, possible portal HTN      Acute respiratory failure with hypoxia -- resolved      GI bleed -- suspect UGI bleed given melena, now resolved   -- hgb now stable on PPI   -- [facility] to do EGD at some point (probably as outpatient)    Active Problems:    Severe Anxiety/?Agoraphobia -- psych saw patient   -- Trazodone 50 mg at bedtime   -- Zyprexa prn agitation or hallucinations       Smoker -- encouraged smoking cessation       Alcoholic cirrhosis of liver with ascites    -- avoid alcohol    -- starting Spironolactone 25 mg bid        Plan:  Discussed with patient and  today -- continue medical support. .     DVT Prophylaxis: her INR is currently 2 and she is high risk to bleed  Code Status: Full Code     Disposition: Expected discharge ?Monday -- to TCU.     Dudley Willis MD  Pager [phone]  Cell Phone [phone]  Text Page (7am to 6pm)  Interval History   Awake, finally making sense today and ate a good breakfast.     Physical Exam   Temp: 98.8  F  (37.1  C) Temp src: Oral BP: 95/60   Heart Rate: 100 Resp: 24 SpO2: 94 % O2 Device: None (Room air)    Vitals:    10/08/19 0400 10/12/19 0424 10/13/19 0613   Weight: 66.1 kg (145 lb 11.2 oz) 64.4 kg (141 lb 15.6 oz) 66.1 kg (145 lb 11.6 oz)     Vital Signs with Ranges  Temp:  [98.8  F (37.1  C)-100.2  F (37.9  C)] 98.8  F (37.1  C)  Heart Rate:  [100-108] 100  Resp:  [18-24] 24  BP: ()/(60-66) 95/60  SpO2:  [94 %-95 %] 94 %  I/O last 3 completed shifts:  In: 791 [P.O.:120; NG/GT:671]  Out: 725 [Urine:725]    # Pain Assessment:  Current Pain Score 10/13/2019   Patient currently in pain? crista   - Radha is experiencing discomfort but non-specific. Pain management was discussed and the plan was created in a collaborative fashion.  Radha's response to the current recommendations: confused  - hold sedating medications at this time.     Constitutional: alert, cooperative  Respiratory: Clear to auscultation bilaterally, no crackles or wheezing  Cardiovascular: Regular rate and rhythm, normal S1 and S2, and no murmur noted  GI: bowel sounds present, soft, mildly distended consistent with moderate ascites  Extrem: No calf tenderness, no ankle edema  Neuro: more alert, sitting in chair       Medications     - MEDICATION INSTRUCTIONS -         lactulose  20 g Oral TID     multivitamin, therapeutic  1 tablet Oral Daily     pantoprazole  40 mg Oral BID AC     rifaximin  550 mg Oral or NG Tube BID     spironolactone  25 mg Oral BID     traZODone  50 mg Oral At Bedtime     vitamin B1  100 mg Oral Daily       Data   Recent Labs   Lab 10/13/19  0930 10/12/19  1412 10/11/19  0813 10/10/19  1004 10/10/19  0831 10/09/19  0537   WBC 11.7* 13.2* 9.8  --  9.6 12.8*   HGB 8.5* 8.4* 7.8*  --  8.1* 8.2*   * 107* 106*  --  105* 104*   * 75* 67*  --  56* 57*   INR  --  1.65*  --  1.88*  --  2.01*    142 148*  --  145* 145*   POTASSIUM 3.4 3.7 4.4  --  3.0* 3.5   CHLORIDE 108 112* 119*  --  114* 114*   CO2 23 24 26   --  27 23   BUN 19 17 17  --  21 30   CR 0.89 0.88 1.03  --  1.26* 2.14*   ANIONGAP 6 6 3  --  4 8   KARLEY 8.0* 7.6* 8.4*  --  8.5 8.6   * 125* 113*  --  118* 137*   ALBUMIN  --  2.1*  --   --  2.2*  --    PROTTOTAL  --  6.7*  --   --  6.5*  --    BILITOTAL  --  1.5*  --   --  2.6*  --    ALKPHOS  --  174*  --   --  142  --    ALT  --  38  --   --  39  --    AST  --  70*  --   --  72*  --        Imaging:   No results found for this or any previous visit (from the past 24 hour(s)).

## 2019-10-13 NOTE — PLAN OF CARE
Date/time: 10/13/19 0757-1260  Cognitive/orientation: alert, oriented X2/3  Behavior/aggression: green  CIWA score: n/a  Abnl VS/O2: tachycardic, tachypnic, RA  Mobility: A1 with walker and belt, ambulated to bathroom/hallway and up to chair for meals  Pain Management denies  Diet: Mechanical soft, thin liquids, tolerating; TF clamped, can be removed when tolerating diet  Bowel/bladder: joiner catheter in place, patent; cont/incont loose stool  Abnl lab/bg: Hgb low trending upward, WBC slightly elevated   Drain/devices: TF clamped, RPIV saline locked, joiner catheter  Telemetry rhythm: n/a  Skin: slightly jaundiced, dry, bruising  Test/procedures: n/a  discharge day/goal/place: OT recommends TCU  Other important info: large pills crushed with pudding/yogurt

## 2019-10-13 NOTE — PLAN OF CARE
Discharge Planner OT   Patient plan for discharge: TCU per family members  Current status: Pt sit>stand from chair, CGA w/FWW and vc's for hand placement. Min A x1 for ambulation to bathroom. Min A for toilet transfer, Mod A for chuck area g/h, min A for clothing management. Pt stand>sit min A for hand placement and vc's for walker safety. Pt disoriented to date/time, but oriented self.   Barriers to return to prior living situation: Level of A w/ADL and mobility, cognition, decreased activity tolerance.medical status  Recommendations for discharge: TCU  Rationale for recommendations: Pt will benefit from continued IP OT to improve strength and activity tolerance for ADL's and functional transfers. Pt will benefit from continued skilled OT services at next level care to improve safety and maximize IND w/ADL's and functional transfers.        Entered by: Carolyn Aguilera 10/13/2019 10:53 AM

## 2019-10-13 NOTE — PLAN OF CARE
DATE & TIME: 10/13 7864-6272  Cognitive Concerns/ Orientation : More alert and conversant, oriented x 3  BEHAVIOR & AGGRESSION TOOL COLOR: Green     ABNL VS/O2:  HR tachy at times, other VSS on room air  MOBILITY:  Assist of 1, GB and walker. .   PAIN MANAGMENT:  Denies  DIET: soft diet, tolarating  BOWEL/BLADDER: Olson patent, incontinent of stool  ABNL LAB/BG:t Hgb8.5, WBC 11.7,  DRAIN/DEVICES: IV SL, NG clamped  SKIN: jaundice, bruised  D/C DAY/GOALS/PLACE: pending  OTHER IMPORTANT INFO:  Bowel sounds positive, passing flatus, abdomen distended/bloated. Fine crackles in RLL. Up in chair for meals, ambulated with threapy, tolerating diet,pulled NG out .

## 2019-10-13 NOTE — PLAN OF CARE
DATE & TIME: 10/12 7P-7A                          Cognitive Concerns/ Orientation : More alert and conversant, oriented x 3  BEHAVIOR & AGGRESSION TOOL COLOR: Green     ABNL VS/O2:  HR tachy at times, other VSS on room air  MOBILITY:  Assist of 2, GB and walker. Stood at bedside once.   PAIN MANAGMENT:  Denies  DIET: Full liquid AAT  BOWEL/BLADDER: Olson patent, incontinent of stool  ABNL LAB/BG: Lactic 1.5, Hgb 8.4, WBC 13.2, Plt 75 (trending up)  DRAIN/DEVICES: IV SL, NG clamped  SKIN: jaundice, bruised  D/C DAY/GOALS/PLACE: pending  OTHER IMPORTANT INFO:  Bowel sounds positive, passing flatus, abdomen distended/bloated. Fine crackles in RLL. One large loose BM this shift.

## 2019-10-13 NOTE — PLAN OF CARE
Discharge Planner PT   Patient plan for discharge: rehab  Current status:     Pt demonstrates significant improvement in functional mobility this day. Family present and supportive. VSS on RA during session     STS with CGA x 1 with FWW. Pt ambulated 165' with FWW and CGA, cues for improved mechanics, no LOB however pt tends to veer to R of hallway when fatigued.     Engaged in seated LE exercises, pt easily distracted and required TC to correctly perform exercises    Barriers to return to prior living situation: Level of assist, impaired activity tolerance, cognition, high level balance     Recommendations for discharge: TCU  Rationale for recommendations: TCU to improve above impairments as pt below baseline at this time          Entered by: Steffany Walters 10/13/2019 4:03 PM

## 2019-10-13 NOTE — PROGRESS NOTES
"SPIRITUAL HEALTH SERVICES Progress Note  [facility]    Initiated visit due to LOS.  Pt was in room w/ , Lexie.  Pt spoke about her gratefulness for how \"worse things could be,\" and  stated that pt had improved in the last couple of days.  SH offered opportunities for reflective conversation and prayer.  Pt offered prayer for everybody struggling with health issues.  SH will follow-up per pt/family request.      Suhail Camp  Chaplain Resident    "

## 2019-10-13 NOTE — PLAN OF CARE
Discharge Planner SLP   Patient plan for discharge: did not state  Current status: Per chart, MD advanced diet from clears, to fulls and now pt on mechanical soft. Per pt/family breakfast went well this AM, no swallowing concerns. Pt assessed with thin liquids, DD2 solids - mastication functional, no oral residuals, no s/s aspiration noted, pt denies globus sensation.     Recommendations:  1) continue mechanical soft solids, thin liquids  2) downgrade to nectar thick liquids if any increased difficulty with thin  3) only allow PO when fully alert, responsive/appropriate - hold if not  4) pt must be fully upright, slow pace, small single sips     Barriers to return to prior living situation: below baseline, cognition  Recommendations for discharge: TCU  Rationale for recommendations: Ongoing SLP for dysphagia management to maximize swallow safety with least restrictive diet        Entered by: Radha Bear 10/13/2019 12:59 PM

## 2019-10-14 NOTE — PLAN OF CARE
PT- attempted to see pt this AM. Pt stated that she has been up for hours and is not feeling well at this time and declined any therapy.

## 2019-10-14 NOTE — PLAN OF CARE
DATE & TIME: 10/13/19  Cognitive Concerns/ Orientation : More alert and conversant, oriented x 3  BEHAVIOR & AGGRESSION TOOL COLOR: Green     ABNL VS/O2:  HR tachy at times, other VSS on room air  MOBILITY:  Assist of 1, GB and walker, ambulated in the henley  PAIN MANAGMENT:  Denies  DIET: soft diet, tolerating it well  BOWEL/BLADDER: Olson patent, incontinent of stool  ABNL LAB/BG:t Hgb8.5, WBC 11.7,  DRAIN/DEVICES: IV SL, NG clamped  SKIN: jaundice, bruised  D/C DAY/GOALS/PLACE: pending  OTHER IMPORTANT INFO:  Bowel sounds positive, passing flatus, abdomen distended/bloated. LS clear, Up in chair for meals,  tolerating diet well after NGT was removed.

## 2019-10-14 NOTE — PLAN OF CARE
DATE & TIME: 10/14/19 5262-2336                     Cognitive Concerns/ Orientation : A&Ox3, disoriented to situation.    BEHAVIOR & AGGRESSION TOOL COLOR: Green  CIWA SCORE: n/a     ABNL VS/O2: VSS on RA, soft BP 98/57  MOBILITY: Assist-1 with gait belt and walker  PAIN MANAGMENT: Denies  DIET: Mechanical soft  BOWEL/BLADDER: Olson, incontinent of stool at times.  ABNL LAB/BG: WBC 11.5 Hgb 8.1 Albumin 2.0 INR 1.61  DRAIN/DEVICES: Olson, PIV SL  TELEMETRY RHYTHM: n/a  SKIN: Jaundice, bruised. Preventive Mepilex on coccyx.   TESTS/PROCEDURES: Speech/OT/PT scheduled  D/C DAY/GOALS/PLACE: Pending TCU placement.  OTHER IMPORTANT INFO: Forgetful at times. SW working on placement. L arm in sling.

## 2019-10-14 NOTE — PLAN OF CARE
Discharge Planner OT   Patient plan for discharge: TCU   Current status: pt sitting up in chair upon OT arrival, completed sit to stand Lavinia, amb with FWW  Lavinia to/from bathroom, stood at sink for ADL task with assist needed to shampoo hair, pt stood x ~ 10 mins, c/o increase fatigue with activity. Lavinia to complete transfer back to sit up in chair.   Barriers to return to prior living situation: Level of A w/ADL and mobility, cognition, decreased activity tolerance.medical status  Recommendations for discharge: TCU per plan established by the Occupational Therapist  Rationale for recommendations: Pt will benefit from continued OT to improve strength and activity tolerance for ADL's and functional transfers     Entered by: Lanette Fair 10/14/2019 12:12 PM

## 2019-10-14 NOTE — PROGRESS NOTES
GASTROENTEROLOGY PROGRESS NOTE     SUBJECTIVE: Having some difficulty recalling recent events and history, but alert to person, place, time of day. Conversant. Denies pain, hematochezia, nausea or vomiting. Having 3 stools a day per her report.     OBJECTIVE:   BP 98/57 (BP Location: Right arm)   Pulse 93   Temp 98.2  F (36.8  C) (Oral)   Resp 16   Wt 66.8 kg (147 lb 4.3 oz)   LMP 2001   SpO2 98%   BMI 25.08 kg/m     Temp (24hrs), Av.7  F (37.1  C), Min:97.9  F (36.6  C), Max:100.2  F (37.9  C)     Patient Vitals for the past 72 hrs:   Weight   10/14/19 0658 66.8 kg (147 lb 4.3 oz)   10/13/19 0613 66.1 kg (145 lb 11.6 oz)   10/12/19 0424 64.4 kg (141 lb 15.6 oz)        Intake/Output Summary (Last 24 hours) at 10/11/2019 0839  Last data filed at 10/11/2019 0638  Gross per 24 hour   Intake 2963 ml   Output 1350 ml   Net 1613 ml      PHYSICAL EXAM   Constitutional: Older appearing than stated age, sitting in a chair, no acute distress  Cardiovascular: Regular rate and rhythm. No murmurs rubs or gallops  Respiratory: Clear to auscultation bilaterally  Abdomen: Soft, non-tender, normally active bowel sounds  No asterixis today.     ROS, FH, SH: See initial GI consult for details.     I have reviewed the patient's new clinical lab results:   Recent Labs   Lab Test 10/14/19  0823 10/13/19  0930 10/12/19  1412  10/10/19  1004   WBC 11.5* 11.7* 13.2*   < >  --    HGB 8.1* 8.5* 8.4*   < >  --    * 108* 107*   < >  --    * 100* 75*   < >  --    INR 1.61*  --  1.65*  --  1.88*    < > = values in this interval not displayed.      Recent Labs   Lab Test 10/13/19  0930 10/12/19  1412 10/11/19  0813    142 148*   POTASSIUM 3.4 3.7 4.4   CHLORIDE 108 112* 119*   CO2 23 24 26   BUN 19 17 17   CR 0.89 0.88 1.03   ANIONGAP 6 6 3   KARLEY 8.0* 7.6* 8.4*      Recent Labs   Lab Test 10/12/19  1412 10/10/19  0831 10/07/19  2220 10/07/19  0612 10/06/19  2053  08/10/16  1337   ALBUMIN 2.1* 2.2*  --  2.1*  --     < >  --    BILITOTAL 1.5* 2.6*  --  2.0*  --    < >  --    ALT 38 39  --  26  --    < >  --    AST 70* 72*  --  59*  --    < >  --    ALKPHOS 174* 142  --  86  --    < >  --    PROTEIN  --   --  30*  --  100*  --  Negative    < > = values in this interval not displayed.      10/6/19 CT abdomen pelvis without contrast  IMPRESSION:  1. Large amount of ascites and mesenteric edema are likely secondary  to liver failure, given the patient's history. The liver is small  which could be secondary to cirrhosis. No evidence for splenomegaly,  however, to suggest portal venous hypertension.  2. Gallbladder is filled with radiopaque material which could  represent gallstones.  3. Low-attenuation thickening of the wall of the cecum and ascending  colon could represent chronic inflammatory change. Appendix is not  well-seen on this study.  4. Colonic diverticulosis without evidence for acute diverticulitis.  5. Fibroid uterus.  6. Evaluation of solid organs of the abdomen and pelvis is  significantly limited by lack of IV contrast. Evaluation of the hollow  organs is limited by lack of oral contrast.     Assessment: 59-year-old female with a history of alcohol abuse presenting with hepatic encephalopathy and new ascites. Suspected EtOH cirrhosis.   Ascites was tapped in the ED and was negative for SBP. NG placed and started on lactulose with gradual improvement of her mentation. NG removed 10/13. FIGUEROA resolved. Hgb stable. No melena or hematochezia.   Confusion/encepalopathy has improved.   CT showed low attenuation thickening of wall of cecum and ascending colon - chronic inflammatory change?     Plan:   -2G sodium restricted diet   -Lactulose TID, goal of 3-4 stools per day, continue Rifaximin  -Follow hgb and stool output. Blood transfusions per IM  -Follow LFTs - awaiting labs   -Eventual EGD to screen for varices, likely can be done as outpatient. Will also need colonoscopy   -Hepatology clinic follow up at discharge. Our  office will arrange  -EtOH abstinence  -Will discuss with Dr. Whit Torres, CNP  Cell: [phone]  [facility]  Office: [phone]

## 2019-10-14 NOTE — PLAN OF CARE
Discharge Planner SLP   Patient plan for discharge: Not addressed.  Current status: Patient seen towards the end of her breakfast while up in the chair. She consumed 100% of her breakfast. Vocal quality was clear, she was willing to eat some crackers with good mastication, bolus extraction and oral clearing. Slight vocal change noted when cued to cough she cleared it. Tolerated thin liquids via single swallows without overt Sx of aspiration.     Recommend: 1. Continue on the mechanical soft diet and thin liquids. 2. Up in a chair for all meals and 30 minutes after, small bites/sips, slow rate and alternate liquids/solids every few bites. 3. SLP will continue to follow for diet advancement.     Barriers to return to prior living situation: Deconditioning/cognition.   Recommendations for discharge: TCU  Rationale for recommendations: Short term ST needs for dysphagia management and complete a cognitive linguistic evaluation at the next level of care. Patient is below her baseline.        Entered by: Valerie Jean-Baptiste 10/14/2019 9:31 AM

## 2019-10-14 NOTE — PROGRESS NOTES
SW  D/I: TCU referrals sent via DOD per social work note from this weekend to [facility] for OT, PT, SLP.  Pt prefers private room, and okay with private room fee.    P: Will continue to follow.

## 2019-10-14 NOTE — PROGRESS NOTES
[facility]    Hospitalist Progress Note    Assessment & Plan   59 year old female who presents as transfer from [facility] for hepatic encephalopathy and renal failure, and admitted to [facility] 10/7/2019:    Impression:   Principal Problem:    Hepatic encephalopathy -- clinically better today ... finally       Severe Hypophosphatemia -- related to refeeding   -- corrected, stop replacement      Hypomagnesemia -- relate to drinking and refeeding    -- replacing       ARF (acute renal failure) -- resolved with hydration      Distal left Clavicle Fx -- appears acute (tender)   -- has left arm sling      Lactic acidosis -- resolved      Hypokalemia -- resolved with replacement      Anemia, probable acute blood loss -- stable on PPI   -- seen by [facility], may have EGD at some point      Coagulopathy -- suspect related to alcoholic liver disease   -- INR 2.01 (unchanged after Vit K)   -- recheck INR tomorrow      Thrombocytopenia -- related to ETOH, possible portal HTN      Acute respiratory failure with hypoxia -- resolved      GI bleed -- suspect UGI bleed given melena, now resolved   -- hgb now stable on PPI   -- [facility] to do EGD at some point (probably as outpatient)    Active Problems:    Severe Anxiety/?Agoraphobia -- psych saw patient   -- Trazodone 50 mg at bedtime   -- Zyprexa prn agitation or hallucinations       Smoker -- encouraged smoking cessation       Alcoholic cirrhosis of liver with ascites    -- avoid alcohol    -- starting Spironolactone 25 mg bid        Plan:  Discussed with patient and  today -- continue medical support. .     DVT Prophylaxis: her INR is currently 2 and she is high risk to bleed  Code Status: Full Code     Disposition: Expected discharge ?Tuesday -- to TCU.     Dudley Willis MD  Pager [phone]  Cell Phone [phone]  Text Page (7am to 6pm)  Interval History   Awake, finally making sense today and ate a good breakfast.     Physical Exam   Temp: 98.2   F (36.8  C) Temp src: Oral BP: 103/61 Pulse: 104 Heart Rate: 102 Resp: 16 SpO2: 99 % O2 Device: None (Room air)    Vitals:    10/12/19 0424 10/13/19 0613 10/14/19 0658   Weight: 64.4 kg (141 lb 15.6 oz) 66.1 kg (145 lb 11.6 oz) 66.8 kg (147 lb 4.3 oz)     Vital Signs with Ranges  Temp:  [98.2  F (36.8  C)-98.4  F (36.9  C)] 98.2  F (36.8  C)  Pulse:  [] 104  Heart Rate:  [] 102  Resp:  [16] 16  BP: ()/(55-61) 103/61  SpO2:  [97 %-99 %] 99 %  I/O last 3 completed shifts:  In: 360 [P.O.:360]  Out: 775 [Urine:775]    # Pain Assessment:  Current Pain Score 10/14/2019   Patient currently in pain? anicetoies   - Radha is experiencing discomfort but non-specific. Pain management was discussed and the plan was created in a collaborative fashion.  Radha's response to the current recommendations: confused  - hold sedating medications at this time.     Constitutional: alert, cooperative  Respiratory: Clear to auscultation bilaterally, no crackles or wheezing  Cardiovascular: Regular rate and rhythm, normal S1 and S2, and no murmur noted  GI: bowel sounds present, soft, mildly distended consistent with moderate ascites  Extrem: No calf tenderness, no ankle edema  Neuro: more alert, sitting in chair       Medications     - MEDICATION INSTRUCTIONS -         lactulose  20 g Oral TID     multivitamin, therapeutic  1 tablet Oral Daily     pantoprazole  40 mg Oral BID AC     rifaximin  550 mg Oral or NG Tube BID     spironolactone  25 mg Oral BID     traZODone  50 mg Oral At Bedtime     vitamin B1  100 mg Oral Daily       Data   Recent Labs   Lab 10/14/19  1042 10/14/19  0823 10/13/19  0930 10/12/19  1412 10/11/19  0813 10/10/19  1004   WBC  --  11.5* 11.7* 13.2* 9.8  --    HGB  --  8.1* 8.5* 8.4* 7.8*  --    MCV  --  107* 108* 107* 106*  --    PLT  --  110* 100* 75* 67*  --    INR  --  1.61*  --  1.65*  --  1.88*   NA  --   --  137 142 148*  --    POTASSIUM  --   --  3.4 3.7 4.4  --    CHLORIDE  --   --  108 112* 119*   --    CO2  --   --  23 24 26  --    BUN  --   --  19 17 17  --    CR  --   --  0.89 0.88 1.03  --    ANIONGAP  --   --  6 6 3  --    KARLEY  --   --  8.0* 7.6* 8.4*  --    GLC  --   --  116* 125* 113*  --    ALBUMIN 2.0*  --   --  2.1*  --   --    PROTTOTAL 6.7*  --   --  6.7*  --   --    BILITOTAL 1.7*  --   --  1.5*  --   --    ALKPHOS 121  --   --  174*  --   --    ALT 35  --   --  38  --   --    AST 59*  --   --  70*  --   --        Imaging:   No results found for this or any previous visit (from the past 24 hour(s)).

## 2019-10-14 NOTE — PLAN OF CARE
DATE & TIME: 10/13/19 night    Cognitive Concerns/ Orientation : Disoriented to time    BEHAVIOR & AGGRESSION TOOL COLOR: green  CIWA SCORE: n/a   ABNL VS/O2: can be tachycardic, BP 91/55, room air  MOBILITY: Assist-1 with gait belt and walker  PAIN MANAGMENT:   DIET: Mechanical soft  BOWEL/BLADDER: Olson, incontinent of stool, no BM this shift  ABNL LAB/BG: Hgb 8.5, INR 1.65,   DRAIN/DEVICES: Olson, PIV saline locked  TELEMETRY RHYTHM: n/a  SKIN: jaundice, sling on L arm, mepilex on coccyx/sacrum for prevention, bruising  TESTS/PROCEDURES: Speech/OT/PT scheduled  D/C DAY/GOALS/PLACE: possibly 10/14  OTHER IMPORTANT INFO: forgetful

## 2019-10-14 NOTE — PROGRESS NOTES
BHAVIN  D/I: Pt declined at all three facilities.  Will need to get additional choices.  P: Will continue to follow.

## 2019-10-15 NOTE — PLAN OF CARE
Discharge Planner OT   Patient plan for discharge: TCU   Current status: pt completed supine to sit EOB with HOB elevated and SBA, Lavinia with cues for hand placement sit to stand, amb with FWW CGA/Lavinia to/from tub room, pt required Lavinia and use of grab bars to complete transfer, pt c/o increase fatgiue after activity. Lavinia for BLE to clear EOB with sit to supine.   Barriers to return to prior living situation: Level of A w/ADL and mobility, cognition, decreased activity tolerance.medical status  Recommendations for discharge: TCU per plan established by the Occupational Therapist  Rationale for recommendations: Pt will benefit from continued OT to improve strength and activity tolerance for ADL's and functional        Entered by: Lanette Fair 10/15/2019 2:30 PM

## 2019-10-15 NOTE — PROGRESS NOTES
[facility]    Medicine Progress Note - Hospitalist Service       Date of Admission:  10/7/2019    Assessment & Plan   59 year old female with PMHx of anxiety, depression, hyperlipidemia, GERD, macrocytosis, and EtOH abuse who presented 10/7/19 to [facility] for evaluation of confusion and weakness, diagnosed with hepatic encephalopathy and renal failure. Transferred to [facility] for further evaluation and treatment.      Hepatic encephalopathy, improving  Alcoholic cirrhosis  EtOH abuse: Last drink ~1 month PTA. Worsening mental status with decreased intake over the past few weeks. CT head negative for intracranial abnormality. On admission, ammonia 114, total bilirubin 2.3, direct bilirubin 1.5. Ammonia peak 133.   -- [facility] following patient during hospitalization, appreciate assistance greatly   -- Continue lactulose 20 g TID, rifaximin 550 mg BID, spironolactone 25 mg BID  -- Multivitamin, thiamine  -- Compression stockings   -- 2 g sodium diet     Ascites s/p paracentesis: CT A/P 10/6/19 with large amount of ascites and mesenteric edema noted. Paracentesis performed 10/11 with 2.8 L of straw colored fluid drained. Culture negative.     Acute macrocytic anemia  Thrombocytopenia: Hgb dropped as low as 6.9, baseline Hgb ~13 historically prior to this hospitalization. Platelet andrade 57.   -- Protonix 40 mg BID   -- Outpatient EGD through [facility]    Recent Labs   Lab 10/15/19  0757 10/14/19  0823 10/13/19  0930 10/12/19  1412 10/11/19  0813   HGB 8.6* 8.1* 8.5* 8.4* 7.8*      Melena, concern for upper GI bleed: Melena noted at the beginning of hospitalization, positive hemoccult. EGD deferred until pt more stable. No ongoing melena reported, hgb stable.   -- Outpatient EGD    Coagulopathy: INR 2.05 on admission. Minimally changed after receiving Vitamin K 10 mg on 10/7. This is in the setting of alcoholic liver disease as above.     Lactic acidosis, resolved: 4.4 on admission, resolved after aggressive IVF  resuscitation.      Acute renal failure, resolved: Creatinine 4.26 on admission, resolved after aggressive IVF resuscitation. Nephrology followed during hospitalization.     Hypokalemia, resolved  Hypomagnesemia, resolved  Hypophosphatemia, resolved: Nadirs included potassium 2.5, magnesium 1.4, phosphorus 0.6.   -- Electrolyte replacement protocol in place     Non-severe malnutrition: In the context of acute illness, chronic illness as evidenced by subcutaneous fat loss in orbital region moderate depletion and upper arm region mild depletion, muscle loss in temporal region mild depletion, clavicle bone region mild depletion and dorsal hand region mild depletion. Fluid Retention:  Mild 2+. Note pt did have enteric feeding tube placed 10/7, since removed.   -- Nutrition following, appreciate assistance, goal to tolerate at least 50% meals TID     Distal clavicle fracture, left: XR 10/7 with longitudinal oblique fracture of the distal clavicle without significant displacement.   -- Ortho consulted, see note from 10/7, non-operative management, sling to RUE for comfort, able to remove PRN, OK to use walker if needed for balance, ice to left shoulder PRN, follow-up with Ortho in 2-3 weeks for follow-up XRays.      Acute respiratory failure with hypoxia secondary to volume overload: CXR from 10/8/19 with mildly increased interstitial opacities in both lungs equivocal for interstitial pulmonary edema. Improved after paracentesis. Pt did not require diuresis.      Major depressive disorder, recurrent, severe  Panic disorder with agoraphobia: Seen by Psychiatry 10/10/19, see formal note by Dr. Sandoval.   -- Trazodone 50 mg at bedtime  -- Zyprexa prn agitation or hallucinations     Leukocytosis, resolved: WBC 15.3 on admission, likely acute phase reactant in the setting of above. No evidence for infection at this time.     Fibroid uterus: Noted on CT A/P. No vaginal bleeding reported.   -- Monitor outpatient     Cecal and  ascending colon thickening: Noted on CT A/P 10/6.   -- Consider outpatient colonoscopy with [facility]      Tobacco use: encouraged smoking cessation     Physical deconditioning:   -- PT recommending TCU   -- SW for discharge planning     Diet: Mechanical/Dental Soft Diet  Room Service    DVT Prophylaxis: Pneumatic Compression Devices  Olson Catheter: not present  Code Status: Full Code      Disposition Plan   Expected discharge: Tomorrow, recommended to transitional care unit once safe disposition plan/ TCU bed available.  Entered: Lore Rutledge PA-C 10/15/2019, 8:58 AM       The patient's care was discussed with the Attending Physician, Dr. Juárez, Bedside Nurse, Patient and Patient's Family.    Lore Rutledge PA-C  Hospitalist Service  [facility]  ______________________________________________________________________    Interval History   No acute events overnight. Pt sitting upright in chair. Feels like her legs are swollen. Denies SOB, abdominal distention. Appetite OK. GI following. SW following. Awaiting placement. No acute events overnight.     Data reviewed today: I reviewed all medications, new labs and imaging results over the last 24 hours. I personally reviewed no images or EKG's today.    Physical Exam   Vital Signs: Temp: 98.6  F (37  C) Temp src: Oral BP: 91/59 Pulse: 104 Heart Rate: 100 Resp: 16 SpO2: 99 % O2 Device: None (Room air)    Weight: 136 lbs 9.6 oz    CONSTITUTIONAL: Pt sitting upright in chair, dressed in hospital garb. Appears comfortable. Cooperative with interview. Accompanied by sister at bedside.   HEENT: Normocephalic, atraumatic. Negative for conjunctival redness or scleral icterus.    CARDIOVASCULAR: RRR, no murmurs, rubs, or extra heart sounds appreciated. Pulses +2/4 and regular in upper and lower extremities, bilaterally.   RESPIRATORY: No increased work of breathing.  CTA, bilat; no wheezes, rales, or rhonchi  appreciated.  GASTROINTESTINAL:  Abdomen soft, non-distended. BS auscultated in all four quadrants. Negative for tenderness to palpation.  No masses or organomegaly noted.  MUSCULOSKELETAL: No gross deformities noted. Normal muscle tone.   HEMATOLOGIC/LYMPHATIC/IMMUNOLOGIC: Trace edema in bilateral lower extremities.   NEUROLOGIC: Alert and oriented to person, place, and time. Seems to have poor recall. No focal neuro deficits.   SKIN: Warm, dry, intact. No jaundice noted. Negative for suspicious lesions, rashes, bruising, open sores or abrasions.     Data   Recent Labs   Lab 10/15/19  0757 10/14/19  1042 10/14/19  0823 10/13/19  0930 10/12/19  1412 10/11/19  0813   WBC 9.5  --  11.5* 11.7* 13.2* 9.8   HGB 8.6*  --  8.1* 8.5* 8.4* 7.8*   *  --  107* 108* 107* 106*   *  --  110* 100* 75* 67*   INR 1.50*  --  1.61*  --  1.65*  --    NA  --   --   --  137 142 148*   POTASSIUM  --   --   --  3.4 3.7 4.4   CHLORIDE  --   --   --  108 112* 119*   CO2  --   --   --  23 24 26   BUN  --   --   --  19 17 17   CR  --   --   --  0.89 0.88 1.03   ANIONGAP  --   --   --  6 6 3   KARLEY  --   --   --  8.0* 7.6* 8.4*   GLC  --   --   --  116* 125* 113*   ALBUMIN 2.0* 2.0*  --   --  2.1*  --    PROTTOTAL 6.9 6.7*  --   --  6.7*  --    BILITOTAL 1.5* 1.7*  --   --  1.5*  --    ALKPHOS 115 121  --   --  174*  --    ALT 36 35  --   --  38  --    AST 63* 59*  --   --  70*  --      No results found for this or any previous visit (from the past 24 hour(s)).  Medications     - MEDICATION INSTRUCTIONS -         lactulose  20 g Oral TID     multivitamin, therapeutic  1 tablet Oral Daily     pantoprazole  40 mg Oral BID AC     rifaximin  550 mg Oral or NG Tube BID     spironolactone  25 mg Oral BID     traZODone  50 mg Oral At Bedtime     vitamin B1  100 mg Oral Daily

## 2019-10-15 NOTE — PROGRESS NOTES
CLINICAL NUTRITION SERVICES - REASSESSMENT NOTE    Recommendations Ordered by Registered Dietitian (RD):   - Start milkshakes in addition to diet order  - Reviewed high kcal/high protein diet recs   Malnutrition: (10/15)  % Weight Loss:  Unable to evaluate   % Intake:  Decreased intake does not meet criteria for malnutrition - previously EN reliant  Subcutaneous Fat Loss:  Orbital region moderate depletion and Upper arm region mild depletion  Muscle Loss:  Temporal region mild depletion, Clavicle bone region mild depletion and Dorsal hand region mild depletion  Fluid Retention:  Mild 2+    Malnutrition Diagnosis: Non-Severe malnutrition  In Context of:  Acute illness or injury  Chronic illness or disease  Environmental or social circumstances     EVALUATION OF PROGRESS TOWARD GOALS   Diet: Mechanical Dental Soft    Nutrition Support: Off since 10/13 per MD with diet advancement. Was meeting 100% estimated needs from 10/10-10/13.     Intake/Tolerance:  Pt/ feel she is eating much better than prior to admission. She is getting 3 meals/day. They do note that the portions are large and may be overwhelming to Radha. Recent RN documentation of intakes are variable. Pt eating % and anywhere in between. She would like to try a milkshake. Pt noted that she was having pain with chewing as her dentures seem to have shifted - was biting her lip with each chew.     ASSESSED NUTRITION NEEDS:  Dosing Weight 58 kg - adjusted   Estimated Energy Needs: 8618-9069 kcals (25-30 Kcal/Kg)  Justification: maintenance  Estimated Protein Needs: 70-87 grams protein (1.2-1.5 g pro/Kg)  Justification: maintenance  Estimated Fluid Needs: Per MD    NEW FINDINGS:   Dispo --> planned discharge to TCU  Stooling --> multiple loose daily (lactulose)  Meds --> reviewed   Weight trending --> lowest of admission 62 kg 10/15. Potential for Loss of ~10#, though difficult to assess with fluid retention.     Previous Goals:   EN at goal to  provide % estimated needs.   Evaluation: No longer applies    Previous Nutrition Diagnosis:   Inadequate enteral nutrition infusion related to EN started and held at suboptimal rate in setting of dysphagia and NPO diet as evidenced by no PO x2 days or longer, EN running to meet <75% est protein/energy goals.  Evaluation: Completed - pt no longer on EN    MALNUTRITION  % Weight Loss:  Unable to evaluate   % Intake:  Decreased intake does not meet criteria for malnutrition - previously EN reliant  Subcutaneous Fat Loss:  Orbital region moderate depletion and Upper arm region mild depletion  Muscle Loss:  Temporal region mild depletion, Clavicle bone region mild depletion and Dorsal hand region mild depletion  Fluid Retention:  Mild 2+    Malnutrition Diagnosis: Non-Severe malnutrition  In Context of:  Acute illness or injury  Chronic illness or disease  Environmental or social circumstances    CURRENT NUTRITION DIAGNOSIS  Inadequate oral intake related to early satiety, abd pain as evidenced by variable PO intakes since EN discontinued ranging 25-75% with previously reported intakes of <75% x1-2 monthsPTA.     INTERVENTIONS  Recommendations / Nutrition Prescription  Diet per MD    High kcal/high protein as able    Trial milkshake supplements by request     Implementation  Medical Food Supplement  Nutrition Education: High calorie, high protein.     Assessed learning needs, learning preferences, and willingness to learn    Nutrition Education (Content):  a) Provided handout   a. High calorie, high protein nutrition therapy  b) Discussed   a. above    Nutrition Education (Application):  a) Discussed eating habits and recommended alternative food choices    Patient verbalizes understanding of diet     Anticipate fair compliance    Diet Education - refer to Education Flowsheet    Goals  Patient to tolerate at least 50% meals TID.       MONITORING AND EVALUATION:  Progress towards goals will be monitored and  evaluated per protocol and Practice Guidelines      Myrtle Seay RD, LD  [facility] [place], [address], [facility]   Pager: [phone]  Weekend Pager: [phone]

## 2019-10-15 NOTE — PROGRESS NOTES
SW  SHYANNE) Patient is anticipated to be ready for discharge to TCU today. She has been declined by her first 3 facility choices, so new referrals are needed.    I) Met with patient, spouse Lexie and sister Duane to discuss more options. They agree to referrals to [facility], [facility], [facility] and [facility]. Referrals were sent via [facility]. Spouse will transport. Private room preferred and patient is aware there is a private pay charge for this.    P) Awaiting responses.    Addendum    Patient has been declined by [facility], per Tenisha in Admissions.  Spoke with Lima at [facility], who has found patient's health plan, [employer] is not in their network. She believes patient would have no coverage at their facility.  Arielle at [facility] reports they can accept patient but they are out of network and patient would have only 60% coverage.  Spoke with Gloria Rodney, who is still assessing.    Will follow up with patient once [facility] has given their final answer.

## 2019-10-15 NOTE — PLAN OF CARE
DATE & TIME: 10/14/19 night    Cognitive Concerns/ Orientation : disoriented to time, mutters to herself  BEHAVIOR & AGGRESSION TOOL COLOR: green  CIWA SCORE: n/a   ABNL VS/O2: tachycardic , room air  MOBILITY: Assist-1, gait belt and walker  PAIN MANAGMENT: denies  DIET: mechanical soft  BOWEL/BLADDER: up to bathroom, incontinent of bowel  ABNL LAB/BG: Hgb 8.1,   DRAIN/DEVICES: PIV saline locked  TELEMETRY RHYTHM: n/a  SKIN: yellow, bruising  TESTS/PROCEDURES:   D/C DAY/GOALS/PLACE: pending placement  OTHER IMPORTANT INFO: Post void residual 87 mL

## 2019-10-15 NOTE — DISCHARGE SUMMARY
[facility]    Discharge Summary  Hospitalist    Date of Admission:  10/7/2019  Date of Discharge:  10/17/2019  Discharging Provider: Deng Vásquez MD    Discharge Diagnoses   Principal Problem:    Hepatic encephalopathy (H)  Active Problems:    Anxiety    Smoker    Alcoholic cirrhosis of liver with ascites (H)    ARF (acute renal failure) (H)    Distal left Clavicle Fracture    Lactic acidosis    Hypokalemia    Anemia    Coagulopathy (H)    Hypomagnesemia    Thrombocytopenia (H)    Acute respiratory failure with hypoxia (H)    GI bleed      History of Present Illness   59 year old female who presents as transfer from [facility] [place]. She has history of alcohol abuse, never been treated,  has tired everything to help her -- started anti-depressant 1 month ago, but has disabling anxiety with Agoraphobia, and  admits he has had to buy her alcohol because she is afraid to leave the house. She has had ongoing issues over the past 3 weeks with intermittent confusion, increased fatigue, poor intake, abdominal distention. She has had waxing.waning encephalopathy. This past week she has had decreased intake, and has been spending more time in bed. She initially refused to come to the ED, but he called 911 when she was minimally responsive, brought to [facility] where her labs showed Creatinine 4.26, potassium 2.8, hgb 7.9 and on repeat 6.9 after fluids, albumin 1.8, bili 2.3, AST 60 and ammonia 133.     Hospital Course   Admitted to ICU, NG placed because too obtunded to take po, given IV NS and lactulose per NG, thiamine and folate replaced -- no evidence of withdrawal but  says she has been too weak to drink for several days. Transfused to units of PRBC's, and then hgb stable at 8.0, and on PPI bid.  Was seen by Avila GUILLORY, no EGD done at this time because of unstable condition, but will do EGD and colonoscopy as outpatient in 4 weeks to look for esophageal varices, and screen for  colon cancer.      After several days the patient finally able to have a conversation and alert enough to eat, at which point NG removed and taking po well.  Was seen by psych who felt her problems were consistent with anxiety with depression, and agoraphobia, and agreed with Trazodone to help with sleep and continue her current antidepressant.      She may benefit from chem dep counseling in the future when her medical problems are better.     Was seen by Nephrology, FIGUEROA related to dehydration and creat 0.89 at discharge and potassium, magnesium and phosphorus normal after repeated replacement. Hgb stable at 8.6 at discharge, and WBC 9.5, and albumin 2.0.        Significant Results and Procedures   As above    Pending Results   These results will be followed up by Dr. Harper  Unresulted Labs Ordered in the Past 30 Days of this Admission     No orders found from 9/7/2019 to 10/8/2019.          Code Status   Full Code       Primary Care Physician   Concepcion Mcintyre    Physical Exam   Temp: 98.3  F (36.8  C) Temp src: Oral BP: 97/59   Heart Rate: 99 Resp: 16 SpO2: 99 % O2 Device: None (Room air)    Vitals:    10/13/19 0613 10/14/19 0658 10/15/19 0624   Weight: 66.1 kg (145 lb 11.6 oz) 66.8 kg (147 lb 4.3 oz) 62 kg (136 lb 9.6 oz)     Vital Signs with Ranges  Temp:  [98.3  F (36.8  C)-98.4  F (36.9  C)] 98.3  F (36.8  C)  Heart Rate:  [95-99] 99  Resp:  [16-18] 16  BP: (95-97)/(55-59) 97/59  SpO2:  [97 %-99 %] 99 %  I/O last 3 completed shifts:  In: 840 [P.O.:840]  Out: -     Exam on discharge:   Lungs clear  CV with reg S1S2  Abdomin soft and non-tender, probable moderate ascites  Ankles without edema  Alert, Ox2.5 but improving    Discharge Disposition   Discharged to short-term care facility  Condition at discharge: Good    Consultations This Hospital Stay   GASTROENTEROLOGY IP CONSULT  PHYSICAL THERAPY ADULT IP CONSULT  OCCUPATIONAL THERAPY ADULT IP CONSULT  NEPHROLOGY IP CONSULT  SOCIAL WORK IP CONSULT  ORTHOPEDIC  SURGERY IP CONSULT  WOUND OSTOMY CONTINENCE NURSE  IP CONSULT  PHARMACY IP CONSULT  NUTRITION SERVICES ADULT IP CONSULT  SWALLOW EVAL SPEECH PATH AT BEDSIDE IP CONSULT  PSYCHIATRY IP CONSULT  NUTRITION SERVICES ADULT IP CONSULT  PHYSICAL THERAPY ADULT IP CONSULT  OCCUPATIONAL THERAPY ADULT IP CONSULT    Time Spent on this Encounter   I spent a total of 35 minutes discharging this patient.     Discharge Orders      General info for SNF    Length of Stay Estimate: Short Term Care: Estimated # of Days <30  Condition at Discharge: Improving  Level of care:skilled   Rehabilitation Potential: Good  Admission H&P remains valid and up-to-date: Yes  Recent Chemotherapy: N/A  Use Nursing Home Standing Orders: Yes     Mantoux instructions    Give two-step Mantoux (PPD) Per Facility Policy Yes     Reason for your hospital stay    Alcohol related acute renal failure and hepatic encephalopathy -- with underlying anxiety with agoraphobia.     Follow Up and recommended labs and tests    Follow up with Nursing home physician in 5 days, check CBC and BMP then, and follow-up with [facility] in 3-4 weeks, consider EGD and possible colonoscopy as an outpatient. Please call the clinic to schedule [phone].     Activity - Up with nursing assistance     Full Code     Physical Therapy Adult Consult    Evaluate and treat as clinically indicated.    Reason:  weakness     Occupational Therapy Adult Consult    Evaluate and treat as clinically indicated.    Reason:  Assist with ADL's     Advance Diet as Tolerated    Follow this diet upon discharge: Orders Placed This Encounter      Regular diet, no added salt (3 gm sodium)     Discharge Medications   Current Discharge Medication List      START taking these medications    Details   lactulose (CHRONULAC) 10 GM/15ML solution Take 30 mLs (20 g) by mouth 2 times daily  Qty: 500 mL, Refills: 3    Comments: To prevent hepatic encephalopathy. HOLD if patient has had more than 4  loose stools that day.  Associated Diagnoses: Hepatic encephalopathy (H)      multivitamin, therapeutic (THERA-VIT) TABS tablet Take 1 tablet by mouth daily    Associated Diagnoses: Alcoholic cirrhosis of liver with ascites (H)      ondansetron (ZOFRAN-ODT) 4 MG ODT tab Take 1 tablet (4 mg) by mouth every 6 hours as needed for nausea or vomiting    Associated Diagnoses: Alcoholic cirrhosis of liver with ascites (H)      spironolactone (ALDACTONE) 25 MG tablet Take 1 tablet (25 mg) by mouth 2 times daily  Refills: 0    Associated Diagnoses: Alcoholic cirrhosis of liver with ascites (H)      traZODone (DESYREL) 50 MG tablet Take 2 tablets (100 mg) by mouth At Bedtime    Associated Diagnoses: Anxiety      vitamin B1 (THIAMINE) 100 MG tablet Take 1 tablet (100 mg) by mouth daily for 7 days  Qty: 7 tablet, Refills: 0    Associated Diagnoses: Alcoholic cirrhosis of liver with ascites (H)         CONTINUE these medications which have NOT CHANGED    Details   Cyanocobalamin (B-12) 1000 MCG TBCR Take 1,000 mcg by mouth daily  Qty: 100 tablet, Refills: 1    Associated Diagnoses: Vitamin B12 deficiency (non anemic)      omeprazole (PRILOSEC) 20 MG DR capsule Take 1 capsule (20 mg) by mouth daily  Qty: 90 capsule, Refills: 0    Associated Diagnoses: Gastroesophageal reflux disease without esophagitis      vilazodone (VIIBRYD) 40 MG TABS tablet Take 1 tablet (40 mg) by mouth daily  Qty: 90 tablet, Refills: 0    Associated Diagnoses: Episode of recurrent major depressive disorder, unspecified depression episode severity (H); Anxiety      vitamin D3 (CHOLECALCIFEROL) 2000 units (50 mcg) tablet Take 1 tablet by mouth daily         STOP taking these medications       ALPRAZolam (XANAX) 0.5 MG tablet Comments:   Reason for Stopping:         amitriptyline (ELAVIL) 25 MG tablet Comments:   Reason for Stopping:         Omega-3 Fatty Acids (FISH OIL CONCENTRATE) 1000 MG CAPS Comments:   Reason for Stopping:         rosuvastatin (CRESTOR)  20 MG tablet Comments:   Reason for Stopping:         valACYclovir (VALTREX) 500 MG tablet Comments:   Reason for Stopping:             Allergies   Allergies   Allergen Reactions     Amoxicillin      Rash     Data   Most Recent 3 CBC's:  Recent Labs   Lab Test 10/17/19  0835 10/16/19  0858 10/15/19  0757   WBC 10.2 9.4 9.5   HGB 8.3* 8.6* 8.6*   * 109* 106*    160 147*      Most Recent 3 BMP's:  Recent Labs   Lab Test 10/17/19  0835 10/16/19  0858 10/13/19  0930   * 135 137   POTASSIUM 3.6 3.6 3.4   CHLORIDE 103 106 108   CO2 23 22 23   BUN 13 15 19   CR 0.71 0.71 0.89   ANIONGAP 6 7 6   KARLEY 8.6 8.4* 8.0*   GLC 97 84 116*     Most Recent 2 LFT's:  Recent Labs   Lab Test 10/17/19  0835 10/16/19  0858   AST 52* 61*   ALT 32 37   ALKPHOS 104 122   BILITOTAL 1.5* 1.5*     Most Recent INR's and Anticoagulation Dosing History:  Anticoagulation Dose History     Recent Dosing and Labs Latest Ref Rng & Units 10/6/2019 10/8/2019 10/9/2019 10/10/2019 10/12/2019 10/14/2019 10/15/2019    INR 0.86 - 1.14 2.05(H) 2.05(H) 2.01(H) 1.88(H) 1.65(H) 1.61(H) 1.50(H)        Most Recent 3 Troponin's:No lab results found.  Most Recent Cholesterol Panel:  Recent Labs   Lab Test 06/19/19  1044   CHOL 81   LDL 36   HDL 30*   TRIG 75     Most Recent 6 Bacteria Isolates From Any Culture (See EPIC Reports for Culture Details):  Recent Labs   Lab Test 10/11/19  1310 10/06/19  2053 10/06/19  2017 10/06/19  1850 08/10/16  1336 09/04/15  1155   CULT No growth No growth No growth No growth >100,000 colonies/mL Escherichia coli* 10,000 to 50,000 colonies/mL mixed urogenital mino Susceptibility testing not   routinely done       Most Recent TSH, T4 and A1c Labs:  Recent Labs   Lab Test 10/06/19  1852 06/19/19  1044   TSH 2.20 4.58*   T4  --  1.26

## 2019-10-15 NOTE — PLAN OF CARE
Discharge Planner PT   Patient plan for discharge: rehab  Current status:  Pt transferred sit to/from stand with CGA. Gait training 150' with FWW and CGA progressing to SBA. Pt ambulated with a step through pattern and slow pace. Pt c/o being fatigued but no SOB noted. Pt ambulated with CGA and no AD in room due to small amount of space, no LOB noted. Pt left sitting in chair with alarm on and needs in reach.      Barriers to return to prior living situation: Level of assist, impaired activity tolerance, cognition, high level balance      Recommendations for discharge: TCU per plan established by the PT.  Rationale for recommendations: TCU to improve above impairments as pt below baseline at this time        Entered by: Sally Houston 10/15/2019 8:29 AM

## 2019-10-15 NOTE — PLAN OF CARE
DATE & TIME: 10/13/19  Cognitive Concerns/ Orientation : More alert and conversant, oriented x 3, participates in care, still mumble words from time to time  BEHAVIOR & AGGRESSION TOOL COLOR: Green     ABNL VS/O2:  HR tachy at times, other VSS on room air  MOBILITY:  Assist of 1, GB and walker, ambulated in the henley  PAIN MANAGMENT:  Denies  DIET: soft diet, tolerating it well  BOWEL/BLADDER: voided, incontinent of stool  ABNL LAB/BG:t Hgb8.1 trending down, Platelet 110, Albumin 2.0  DRAIN/DEVICES: IV SL  SKIN: jaundice, bruised  D/C DAY/GOALS/PLACE: declined by 3 facilities today,  to provide choices to  tomorrow  OTHER IMPORTANT INFO:  Bowel sounds positive, passing flatus, appetite improving, LS  with fine crackles in the bases, Up in chair for meals,  tolerating diet well, observe voiding tonight, latest PVR at around 8pm is 243ml.

## 2019-10-15 NOTE — PLAN OF CARE
SLP: Attempted to see patient for swallow treatment with her  and sister present. Patient reports biting her right lip and cheek.  feels she is not paying attention while eating. She declined to any trials of solids such as toast. Stated she loves the nutritional shakes. Will reschedule for 10/16/19 a noon meal.

## 2019-10-15 NOTE — PROGRESS NOTES
GASTROENTEROLOGY PROGRESS NOTE     SUBJECTIVE: Frustrated there is no set TCU placement for today - they are working on it.  at bedside. Conversant. Denies pain, hematochezia, nausea or vomiting. Multiple loose stools per day. Decreased appetite.     OBJECTIVE:   BP 91/59 (BP Location: Right arm)   Pulse 104   Temp 98.6  F (37  C) (Oral)   Resp 16   Wt 62 kg (136 lb 9.6 oz)   LMP 2001   SpO2 99%   BMI 23.27 kg/m     Temp (24hrs), Av.7  F (37.1  C), Min:97.9  F (36.6  C), Max:100.2  F (37.9  C)     Patient Vitals for the past 72 hrs:   Weight   10/15/19 0624 62 kg (136 lb 9.6 oz)   10/14/19 0658 66.8 kg (147 lb 4.3 oz)   10/13/19 0613 66.1 kg (145 lb 11.6 oz)        Intake/Output Summary (Last 24 hours) at 10/11/2019 0839  Last data filed at 10/11/2019 0638  Gross per 24 hour   Intake 2963 ml   Output 1350 ml   Net 1613 ml      PHYSICAL EXAM   Constitutional: Sitting in a chair, no acute distress  Cardiovascular: Regular rate and rhythm  Respiratory: Clear to auscultation bilaterally  Abdomen: Soft, non-tender, normally active bowel sounds  No asterixis today.     ROS, FH, SH: See initial GI consult for details.     I have reviewed the patient's new clinical lab results:   Recent Labs   Lab Test 10/15/19  0757 10/14/19  0823 10/13/19  0930 10/12/19  1412   WBC 9.5 11.5* 11.7* 13.2*   HGB 8.6* 8.1* 8.5* 8.4*   * 107* 108* 107*   * 110* 100* 75*   INR 1.50* 1.61*  --  1.65*      Recent Labs   Lab Test 10/13/19  0930 10/12/19  1412 10/11/19  0813    142 148*   POTASSIUM 3.4 3.7 4.4   CHLORIDE 108 112* 119*   CO2 23 24 26   BUN 19 17 17   CR 0.89 0.88 1.03   ANIONGAP 6 6 3   KARLEY 8.0* 7.6* 8.4*      Recent Labs   Lab Test 10/15/19  0757 10/14/19  1042 10/12/19  1412  10/07/19  2220  10/06/19  2053  08/10/16  1337   ALBUMIN 2.0* 2.0* 2.1*   < >  --    < >  --    < >  --    BILITOTAL 1.5* 1.7* 1.5*   < >  --    < >  --    < >  --    ALT 36 35 38   < >  --    < >  --    < >  --     AST 63* 59* 70*   < >  --    < >  --    < >  --    ALKPHOS 115 121 174*   < >  --    < >  --    < >  --    PROTEIN  --   --   --   --  30*  --  100*  --  Negative    < > = values in this interval not displayed.      10/6/19 CT abdomen pelvis without contrast  IMPRESSION:  1. Large amount of ascites and mesenteric edema are likely secondary  to liver failure, given the patient's history. The liver is small  which could be secondary to cirrhosis. No evidence for splenomegaly,  however, to suggest portal venous hypertension.  2. Gallbladder is filled with radiopaque material which could  represent gallstones.  3. Low-attenuation thickening of the wall of the cecum and ascending  colon could represent chronic inflammatory change. Appendix is not  well-seen on this study.  4. Colonic diverticulosis without evidence for acute diverticulitis.  5. Fibroid uterus.  6. Evaluation of solid organs of the abdomen and pelvis is  significantly limited by lack of IV contrast. Evaluation of the hollow  organs is limited by lack of oral contrast.     Assessment: 59-year-old female with a history of alcohol abuse presenting with hepatic encephalopathy, alcoholic hepatitis, and new ascites. No apparent cirrhosis on imaging but liver is small. LFTs/INR continue to improve. Ascites was tapped in the ED and was negative for SBP. NG placed and started on lactulose with gradual improvement of her mentation. NG removed 10/13. FIGUEROA resolved. Hgb stable. No melena or hematochezia. Confusion/encepalopathy has improved.   CT showed low attenuation thickening of wall of cecum and ascending colon - chronic inflammatory change? No chronic symptoms.   Currently stable. Hgb stable. Working on increasing strength and reintroducing diet. Current plan is to pursue endoscopic workup outpatient in a month.     Plan:   -2G sodium restricted diet   -Lactulose TID, goal of 3-4 stools per day, continue Rifaximin  -Follow hgb and stool output. Blood  transfusions per IM  -Follow LFTs  -Eventual EGD to screen for varices, likely can be done as outpatient. Will also need colonoscopy at that time given abnormal CT scan imaging. She has never has a colonoscopy.   -Hepatology clinic follow up at discharge. Our office will arrange  -EtOH abstinence   -Discussed with Dr. Sherman. No further GI recommendations at this time. We will sign off. Please call with any questions.    Tess Torres CNP  Cell: [phone]  [facility]  Office: [phone]

## 2019-10-15 NOTE — PLAN OF CARE
DATE & TIME: Day shift 10/15/19                     Cognitive Concerns/ Orientation : A&Ox4, but forgetful and intermittently confused  BEHAVIOR & AGGRESSION TOOL COLOR: Green  CIWA SCORE: N/A     ABNL VS/O2: Tachycardic -110, room air  MOBILITY: Assist-1, gait belt and walker  PAIN MANAGMENT: Denies   DIET: Mechanical soft 2gm sodium diet  BOWEL/BLADDER: Up to bathroom, incontinent of bowel at times, 3 BM's today, continuing PVR's   ABNL LAB/BG: INR 1.50, Hgb 8.6  DRAIN/DEVICES: PIV saline locked  TELEMETRY RHYTHM: n/a  SKIN: Jaundice, bruising. +2 BLE edema. Compression stockings on.   TESTS/PROCEDURES: N/A  D/C DAY/GOALS/PLACE: Pending placement  OTHER IMPORTANT INFO:

## 2019-10-16 NOTE — PLAN OF CARE
DATE & TIME: 10/16/19 5399-1582                            Cognitive Concerns/ Orientation : A+Ox3, disoriented to situation.      BEHAVIOR & AGGRESSION TOOL COLOR: Green    CIWA SCORE: NA      ABNL VS/O2: VSS, on RA.    MOBILITY: Up with 1 assist GB + walker in room and halls.     PAIN MANAGMENT: Denies     DIET: Regular 2g Na+ diet, tolerating well.     BOWEL/BLADDER: Continent of bladder, no BM this shift.     ABNL LAB/BG: Hgb stable @ 8.16.    DRAIN/DEVICES: NA    TELEMETRY RHYTHM: NA    SKIN: Blanchable redness to coccyx/buttocks. Mepilex CDI.    TESTS/PROCEDURES: NA    D/C DAY/GOALS/PLACE: Pt accepted to [facility] today. Tentative discharge tomorrow 10/17 pending insurance auth.    OTHER IMPORTANT INFO: Jaundiced skin + sclera. Abd distended, parcentesis site CDI. +2 edema to BLE, ADIS socks in place. GI/PT/OT following. Nursing will continue to monitor.

## 2019-10-16 NOTE — PROGRESS NOTES
BHAVIN Note:    D/I:  SW placed call to [facility] with [facility] who stated that they would be able to accept patient, and will start Insurance Authorization for patient.    Addendum:  SW met with patient and informed that she had been accepted at [facility].  Patient stated that she would like to have a private room and would be willing to pay for it if available.  Left Vm for [facility] to update.  Patient stated that her  would provide transportation for patient to the facility upon discharge.  Discussed a tentative discharge time of 10:30 on 10/17 if we have received insurance auth.     Addendum: SW received call back from [facility] and they have all private rooms at [facility].  They will not hold a bed for her as they have to wait for the insurance authorization, but she feels they will have a bed available.     P: SW will continue to assist and follow for discharge.    ALEXX Veronica, LGSW

## 2019-10-16 NOTE — PLAN OF CARE
Discharge Planner PT   Patient plan for discharge: rehab  Current status:  Pt transferred sit to/from stand with SBA. Gait training 550' total. First 120' pt ambulated with no AD and CGA, slight LOB to the R side noted able to correct IND. Ambulated 430' with FWW and CGA progressing to SBA. When fatigued pt sways side to side when ambulating and needed a sitting rest break. Following ambulation pt transferred sit to supine with SBA and A of hand rails to boost up in bed. Pt left supine with nursing staff present.   Barriers to return to prior living situation: Level of assist, impaired activity tolerance, cognition, high level balance      Recommendations for discharge: TCU per plan established by the PT.  Rationale for recommendations: TCU to improve above impairments as pt below baseline at this time       Entered by: Sally Houston 10/16/2019 10:53 AM

## 2019-10-16 NOTE — PROGRESS NOTES
[facility]    Hospitalist Progress Note    Date of Service (when I saw the patient): 10/16/2019    Assessment & Plan   Radha Turcios is a 59 year old female with hx of alcoholic cirrhosis who was admitted on 10/7/2019 for decompensated cirrhosis with hepatic encephalopathy     Acute hepatic encephalopathy, Resolved  Decompensated alcoholic cirrhosis with ascites s/p paracentesis on 10/11/2019  Elevated transaminases due to cirrhosis  Thrombocytopenia due to cirrhosis  Coagulopathy due to cirrhosis  History of alcohol abuse  * Presented with progressive confusion with decreased intake over several weeks. Last drink ~1 month PTA. Head CT on arrival showed no acute intracranial pathology. Ammonia level on admission was elevated to 133. CT abd/pelvis on arrival showed a large amount of ascites with mesenteric edema. She ultimately underwent paracentesis on 10/11 with removal of 2.8L of ascitic fluid, and there was no evidence of SBP. Fluid culture was also negative.  * [facility] was consulted on admission, and she was started on lactulose and rifaximin with gradual improvement in her mental status. She was also started on spironolactone and pantoprazole for cirrhosis.  * Transaminases have been only mildly elevated. Tbili peaked at 2.6, now stable in 1.5-1.7 range. INR peaked at 2.5, now stable in 1.5 range. Platelet andrade 56k, but has since recovered  - Continues on lactulose 20 g TID, rifaximin 500 mg BID  - She has also been started on spironolactone 25 mg BID and pantoprazole 40 mg PO BID for GI ppx   - She has been started on thiamine and MVI given history of alcohol use  - Compression stockings  - 2 gram sodium diet  - Follow up with [facility] after discharge    Acute hypoxic respiratory failure due to pulmonary edema, Resolved  RRT called on 10/8 for acute respiratory distress with hypoxia requiring up to 8L by OM. CXR at that time showed pulmonary edema and elevated BNP to 5600.  Pulmonary edema was attributed to decompensated cirrhosis and renal failure. She was receiving IV fluids at the time, which were discontinued. Her respiratory status continued to improve as renal function improved, and following paracentesis on 10/11.  - Now breathing comfortably on room air    Acute anemia with possible upper GI bleed  Hgb andrade 6.9 during this hospitalization from 13.6 in June 2019. Isolated episode of melena and positive hemoccult was noted earlier during her hospitalization. She has been evaluated by [facility] who recommended medical management and outpatient EGD. She received 1u prbcs and her hemoglobin has since been stable in the mid 8 range.  - She has been started on pantoprazole 40 mg PO BID  - Follow up with [facility] for outpatient EGD    Acute renal failure, Resolved  Creatinine up to 4.9 from baseline <1. Due to pre-renal state from decreased PO intake. Nephrology was consulted on admission and recommended aggressive IV fluids, which she received with ongoing improvement in her renal function  - Creatinine now stable in 0.7-0.8 range    Non-severe malnutrition in context of acute illness  Received tube feeds earlier during her hospitalization due to encephalopathy. She has since been transitioned to a diet, and Nutrition has recommended high protein diet as able as well as milkshake supplements    Distal clavicle fracture  Incidentally noted on admission CXR. Ortho was consulted on admission, and recommended supportive cares: ice prn, sling to RUE for comfort - able to remove PRN  - Follow up with Ortho in 2-3 weeks for follow-up XRs    Tobacco use disorder  Longstanding history of smoking with 23 pack year smoking history. Nicotine replacement was offered and declined  - Smoking cessation has been advised    Major recurrent depressive disorder, severe  Panic disorder with agoraphobia  * PTA on alprazolam PRN  She was evaluated by Psychiatry on 10/10, and was started on trazodone 50 mg  qhs. PTA alprazolam was discontinued.  - Continues on trazodone    DVT Prophylaxis: Pneumatic Compression Devices  Code Status: Full Code    Disposition: Expected discharge to TCU pending insurance authorization    Meena Juárez    Interval History   No events overnight. Awaiting TCU placement. Reports mild right chest/shoulder pain, but offers no other concerns. Denies shortness of breath, dizziness/lightheadedness, or nausea    -Data reviewed today: I reviewed all new labs and imaging results over the last 24 hours. I personally reviewed no images or EKG's today.    Physical Exam   Temp: 98.2  F (36.8  C) Temp src: Oral BP: 100/63   Heart Rate: 95 Resp: 16 SpO2: 97 % O2 Device: None (Room air)    Vitals:    10/13/19 0613 10/14/19 0658 10/15/19 0624   Weight: 66.1 kg (145 lb 11.6 oz) 66.8 kg (147 lb 4.3 oz) 62 kg (136 lb 9.6 oz)     Vital Signs with Ranges  Temp:  [98.1  F (36.7  C)-98.7  F (37.1  C)] 98.2  F (36.8  C)  Heart Rate:  [] 95  Resp:  [16-18] 16  BP: ()/(48-65) 100/63  SpO2:  [94 %-99 %] 97 %  I/O last 3 completed shifts:  In: 780 [P.O.:780]  Out: 200 [Urine:200]    Constitutional: Chronically ill appearing, NAD  Respiratory: Breathing non-labored. Lungs CTAB - no wheezes, crackles, or rhonchi  Cardiovascular: Heart RRR, no m/r/g. 1+ BLE edema, compression stockings in place  GI: +BS, abd distended, but soft/NT  Skin/Integument: Mild jaundice  Neuro: Alert and appropriate, KNOX  Psych: Calm and cooperative    Medications     - MEDICATION INSTRUCTIONS -         lactulose  20 g Oral TID     multivitamin, therapeutic  1 tablet Oral Daily     pantoprazole  40 mg Oral BID AC     rifaximin  550 mg Oral or NG Tube BID     spironolactone  25 mg Oral BID     traZODone  50 mg Oral At Bedtime     vitamin B1  100 mg Oral Daily     Data   Recent Labs   Lab 10/16/19  0858 10/15/19  0757  10/14/19  0823 10/13/19  0930 10/12/19  1412   WBC 9.4 9.5  --  11.5* 11.7* 13.2*   HGB 8.6* 8.6*  --  8.1* 8.5*  8.4*   * 106*  --  107* 108* 107*    147*  --  110* 100* 75*   INR 1.48* 1.50*  --  1.61*  --  1.65*     --   --   --  137 142   POTASSIUM 3.6  --   --   --  3.4 3.7   CHLORIDE 106  --   --   --  108 112*   CO2 22  --   --   --  23 24   BUN 15  --   --   --  19 17   CR 0.71  --   --   --  0.89 0.88   ANIONGAP 7  --   --   --  6 6   KARLEY 8.4*  --   --   --  8.0* 7.6*   GLC 84  --   --   --  116* 125*   ALBUMIN 2.1* 2.0*   < >  --   --  2.1*   PROTTOTAL 7.0 6.9   < >  --   --  6.7*   BILITOTAL 1.5* 1.5*   < >  --   --  1.5*   ALKPHOS 122 115   < >  --   --  174*   ALT 37 36   < >  --   --  38   AST 61* 63*   < >  --   --  70*    < > = values in this interval not displayed.       No results found for this or any previous visit (from the past 24 hour(s)).

## 2019-10-16 NOTE — PLAN OF CARE
Discharge Planner SLP   Patient plan for discharge: TCU  Current status: Patient was seen for swallow treatment at noon meal with  and sister present. She demonstrated slow but sufficient mastication of soft solids (fish) and increased texture of toast. She had good bolus extraction and clearing. Minimal oral residue in the lateral sucli with indpendent lingual searching. Tolerating thin liquids via the cup and straw. No overt Sx of aspiration across the meal.     Recommend: 1. Advance diet to regular textures and thin liquids. 2. Up in a chair for all meals, reduce distractions, no talking while chewing, small bites/sips and alternate liquids/solids. 3. SLP will follow up for one more session to ensure diet tolerance.     Barriers to return to prior living situation: Generalized weakness and safety.  Recommendations for discharge: TCU  Rationale for recommendations: May need short term ST needs for dysphagia if goals not met prior to discharge. Patient is near her baseline. May benefit from a cognitive linguistic evaluation at the next level of care.        Entered by: Valerie Jean-Baptiste 10/16/2019 12:35 PM

## 2019-10-16 NOTE — PLAN OF CARE
Discharge Planner OT   Patient plan for discharge: Home  Current status: While seated/standing pt completed LE dressing with CGA and set up. Educated on hand placement and walker safety, as pt demonstrated difficulty on 2 occassions.   Barriers to return to prior living situation: Current level of A for I/ADLs, cognition   Recommendations for discharge: TCU  Rationale for recommendations: Skilled OT to address safety and independence in I/ADLs.        Entered by: Prateek Cleaning 10/16/2019 4:28 PM

## 2019-10-16 NOTE — PLAN OF CARE
DATE & TIME: 10/15/19, 2679 - 8207   Cognitive Concerns/ Orientation : AXO x 3. Disoriented to situation   BEHAVIOR & AGGRESSION TOOL COLOR: Green   ABNL VS/O2: Blood pressures soft. Other VSS on room air  MOBILITY: Up assist x 1, GB and walker  PAIN MANAGMENT: Denied  DIET: 2gm. Mechanical dental soft  BOWEL/BLADDER: Voids in bathroom. . Had 1 small BM  ABNL LAB/BG: N/a  DRAIN/DEVICES: PIV SL  TELEMETRY RHYTHM: N/a  SKIN: Jaundiced and bruised. +2 edema to BLE. Seun stockings on  TESTS/PROCEDURES: N/a  D/C DAY/GOALS/PLACE: Pending placement

## 2019-10-16 NOTE — PROVIDER NOTIFICATION
MD Notification    Notified Person: MD    Notified Person Name: Dr DADA Vines    Notification Date/Time: 10/16/19, 0136    Notification Interaction: Telephone    Purpose of Notification: Low Blood pressures 88/50. Recheck 86/48. Patient asymptomatic. Please advise    Orders Received: Encourage oral intake and monitor patient. If low BP persist then update MD     Comments:

## 2019-10-16 NOTE — PLAN OF CARE
DATE & TIME: 10/16/19 4665-7878    Cognitive Concerns/ Orientation : A+Ox3, disoriented to situation.    BEHAVIOR & AGGRESSION TOOL COLOR: Green  CIWA SCORE: NA   ABNL VS/O2: VSS, on RA.  MOBILITY: Up with 1 assist GB + walker in room and halls.   PAIN MANAGMENT: Denies   DIET: Diet advanced to Regular 2g Na+ diet, tolerating well.   BOWEL/BLADDER: Continent of bladder, no BM this shift.   ABNL LAB/BG: Hgb stable @ 8.16.  DRAIN/DEVICES: NA  TELEMETRY RHYTHM: NA  SKIN: Blanchable redness to coccyx/buttocks. Mepilex changed, CDI.  TESTS/PROCEDURES: NA  D/C DAY/GOALS/PLACE: Pt accepted to [facility] today, discharge date pending insurance authorization.   OTHER IMPORTANT INFO: Jaundiced skin + sclera. Abd distended, parcentesis site CDI. +2 edema to BLE, ADIS socks removed for shower and re-applied. GI/PT/OT following. Nursing will continue to monitor.

## 2019-10-17 NOTE — PROGRESS NOTES
Patient seen and evaluated by me this morning. No acute events overnight. The patient reported some mild left sided rib pain but otherwise no complaints. Tolerating diet without issue.     Pt stable for discharge from inpatient medical perspective. Will await final verdict regarding insurance authorization before releasing discharge paper work.     Deng Vásquez  Hospitalist

## 2019-10-17 NOTE — PLAN OF CARE
Occupational Therapy Discharge Summary    Reason for therapy discharge:    Discharged to transitional care facility.    Progress towards therapy goal(s). See goals on Care Plan in [facility] electronic health record for goal details.  Goals not met.  Barriers to achieving goals:   discharge from facility.    Therapy recommendation(s):    Continued therapy is recommended.  Rationale/Recommendations:  continue skilled OT at TCU.

## 2019-10-17 NOTE — PLAN OF CARE
Discharge    Patient discharged to TCU via wheelchair   Care plan note: Patient alert/orient X4/forgetful.  Lungs clear on RA.  +2 lower extremity edema.  Vss, denied pain.  Discharging today.    Listed belongings gathered and returned to patient. Yes  Care Plan and Patient education resolved: Yes  Prescriptions if needed, hard copies sent with patient  NA  Home and hospital acquired medications returned to patient: NA  Medication Bin checked and emptied on discharge Yes  Follow up appointment made for patient: Yes

## 2019-10-17 NOTE — PLAN OF CARE
PT- Attempted to see pt this AM, pt getting ready to discharge and declined any therapy.    Pt discharged to TCU. PT goals met.

## 2019-10-17 NOTE — PLAN OF CARE
Speech Language Therapy Discharge Summary    Reason for therapy discharge:    Discharged to transitional care facility.    Progress towards therapy goal(s). See goals on Care Plan in [facility] electronic health record for goal details.  Goals partially met.  Barriers to achieving goals:   discharge from facility.    Therapy recommendation(s):    Continued therapy is recommended.  Rationale/Recommendations:  Continue short term ST needs for swallowing to ensure tolerance of regular textures and complete a cognitive linguistic evaluation as indicated..

## 2019-10-17 NOTE — PLAN OF CARE
DATE & TIME: 10/16/19 0146-2052                         Cognitive Concerns/ Orientation : A+Ox3, disoriented to situation.    BEHAVIOR & AGGRESSION TOOL COLOR: Green  CIWA SCORE: NA    ABNL VS/O2: VSS, on RA.  MOBILITY: Up Ax1 GB/W, ambulated in halls.   PAIN MANAGMENT: Declined ice/hot packs for abdominal discomfort, no PRNs available for pain.   DIET: 2g Na+   BOWEL/BLADDER: Continent of bladder, no BM this shift.   ABNL LAB/BG: Hgb stable @ 8.16.  DRAIN/DEVICES: NA  TELEMETRY RHYTHM: NA  SKIN: Blanchable redness to coccyx/buttocks. Mepilex CDI.  TESTS/PROCEDURES: NA  D/C DAY/GOALS/PLACE: Pt accepted to [facility] today. Tentative discharge 10/17 pending insurance auth.  OTHER IMPORTANT INFO: Jaundiced skin + sclera. Abd distended, parcentesis site CDI. +2 edema to BLE, ADIS socks in place. GI/PT/OT following.

## 2019-10-17 NOTE — PROGRESS NOTES
D: BHAVIN following for discharge planning.   I: Spoke with Kristan at [facility]. She is working on getting prior auth from pt's [employer] plan. The insurance plan is requesting PT/OT notes. BHAVIN faxed these to Kristan. Pt updated.   P: Discharge once auth received.     ALEXX Cid, LGSW  [id number]

## 2019-10-18 NOTE — PROGRESS NOTES
[facility]    Medicine Progress Note - Hospitalist Service       Date of Admission:  10/7/2019  Assessment & Plan   The patient is a 60 yo woman with known alcohol abuse transferred from [facility] with newly diagnosed alcoholic hepatitis with hepatic encephalopathy and ascites due to acute decompensated liver cirrhosis.     #. Alcoholic hepatitis   #. Newly diagnosed decompensated liver cirrhosis: marked by new diagnosis of hepatic encephalopathy, ascites, and hyperbilirubinemia with long standing history of alcohol abuse. Mentation improved after staring lactulose and rifaximin. No signs of SBP. Patient encouraged to remain abstinent from drinking.   -lactulose scaled back to 20 g bid given increased stool output and GI upset     #. Alcohol abuse disorder: no signs of withdrawal and patient encouraged to remain abstinent from alcohol.     #. Distal clavicle fracture: incidental finding on CXR and no need for surgical intervention per orthopedic consult.     #. FIGUEROA: resolved following IV hydration and caused by dehydration     #. Non severe mal nutrition: continue to supplement nutrition per RD recommendations.     #. Lactic acidosis: resolved w/ IV hydration     #. Hypokalemia: resolved    #. Thrombocytopenia: due to portal hypertensive gastropathy with splenic sequestration     #. Physical deconditioning: continue work with PT/OT in TCU setting     Diet: Room Service  Advance Diet as Tolerated  Combination Diet Regular Diet Adult; 2 gm NA Diet    DVT Prophylaxis: Ambulate every shift  Olson Catheter: not present  Code Status: Full Code      Disposition Plan   Expected discharge: Today, recommended to transitional care unit once safe disposition plan/ TCU bed available.  Entered: Deng Vásquez MD 10/18/2019, 1:48 PM       The patient's care was discussed with the Patient.    Deng Vásquez MD  Hospitalist Service  [facility]  Hospital    ______________________________________________________________________    Interval History   No acute events overnight. The patient reported more diarrhea and abdominal pain this am which resolved during my evaluation.     Data reviewed today: I reviewed all medications, new labs and imaging results over the last 24 hours. I personally reviewed no images or EKG's today.    Physical Exam   Vital Signs: Temp: 98.3  F (36.8  C) Temp src: Oral BP: 97/59   Heart Rate: 99 Resp: 16 SpO2: 99 % O2 Device: None (Room air)    Weight: 136 lbs 9.6 oz  Gen: NAD, pleasant   HEENT: MMM, EOMI, PERRL   Neck: supple, no posterior/anterior cervical LAD  Lungs: CTAB, no W/R/R, no accessory muscle use   CV: RRR, no M/G/R, no JVD   Abd: non tender, non distended, active bowel sounds x4, no rebound or guarding   Ext: no clubbing/cyanosis/edema   MSK: no joint effusions or tenderness  Skin: no obvious bruising or open wounds   Neuro: AOx3, cranial nerves II-XII in tact, stable gait     Data   Recent Labs   Lab 10/17/19  0835 10/16/19  0858 10/15/19  0757  10/14/19  0823 10/13/19  0930   WBC 10.2 9.4 9.5  --  11.5* 11.7*   HGB 8.3* 8.6* 8.6*  --  8.1* 8.5*   * 109* 106*  --  107* 108*    160 147*  --  110* 100*   INR  --  1.48* 1.50*  --  1.61*  --    * 135  --   --   --  137   POTASSIUM 3.6 3.6  --   --   --  3.4   CHLORIDE 103 106  --   --   --  108   CO2 23 22  --   --   --  23   BUN 13 15  --   --   --  19   CR 0.71 0.71  --   --   --  0.89   ANIONGAP 6 7  --   --   --  6   KARLEY 8.6 8.4*  --   --   --  8.0*   GLC 97 84  --   --   --  116*   ALBUMIN 2.0* 2.1* 2.0*   < >  --   --    PROTTOTAL 7.0 7.0 6.9   < >  --   --    BILITOTAL 1.5* 1.5* 1.5*   < >  --   --    ALKPHOS 104 122 115   < >  --   --    ALT 32 37 36   < >  --   --    AST 52* 61* 63*   < >  --   --     < > = values in this interval not displayed.

## 2019-10-18 NOTE — PLAN OF CARE
DATE & TIME: 10/18/19 7-3pm    Cognitive Concerns/ Orientation : A&OX4   BEHAVIOR & AGGRESSION TOOL COLOR: Green  CIWA SCORE: NA   ABNL VS/O2: Vss, on RA  MOBILITY: Sba/belt/walker  PAIN MANAGMENT: Denies  DIET: 2 gm na  BOWEL/BLADDER: Continent of both, has diarrhea/lactulose  ABNL LAB/BG: Na 132/Hgb 8.3  DRAIN/DEVICES: NA  TELEMETRY RHYTHM: NA  SKIN: Jaudice/bruised  TESTS/PROCEDURES: NA  D/C DAY/GOALS/PLACE: Pt accepted to [facility]. Discharge today 10/18 pending insurance auth  OTHER IMPORTANT INFO: Walked X2 today

## 2019-10-18 NOTE — PROGRESS NOTES
D: BHAVIN following for discharge planning.   I: Pt will discharge today to [facility] via spouse at 1530. Kristan at [facility] received insurance auth. Orders faxed. Pt and spouse in agreement. Questions answered. Nursing aware.   P: Discharge. [place] updated.    Maria C Swain, ALEXX, LGSW  [id number]

## 2019-10-18 NOTE — PLAN OF CARE
DATE & TIME: 10/18/19 5:33 AM      Cognitive Concerns/ Orientation : A/Ox3, disoriented to situation.    BEHAVIOR & AGGRESSION TOOL COLOR: Green  CIWA SCORE: NA    ABNL VS/O2: VSS on RA.  MOBILITY:SBA   PAIN MANAGMENT: c/o of abdominal discomfort, declines any intervention. Abd is distended but soft. Denies nausea.   DIET: Regular 2g Na+ diet   BOWEL/BLADDER: Continent of bladder, no BM overnight.   ABNL LAB/BG: Hgb 8.3, Bili Total 1.5.   DRAIN/DEVICES: NA  TELEMETRY RHYTHM: NA  SKIN: Blanchable redness to coccyx/buttocks. Mepilex CDI.  TESTS/PROCEDURES: NA  D/C DAY/GOALS/PLACE: Pt accepted to [facility]. Discharge tomorrow 10/18 pending insurance auth.  OTHER IMPORTANT INFO: Jaundiced skin + sclera. +2 edema to BLE, ADIS socks in place.

## 2019-10-18 NOTE — PROGRESS NOTES
DATE & TIME: 10/17/19 7148-9210                Cognitive Concerns/ Orientation : A+Ox3, disoriented to situation.       BEHAVIOR & AGGRESSION TOOL COLOR: Green     CIWA SCORE: NA       ABNL VS/O2: VSS, on RA.     MOBILITY: Up with SBA assist GB + walker in room and halls.      PAIN MANAGMENT: Denies      DIET: Regular 2g Na+ diet, tolerating well.      BOWEL/BLADDER: Continent of bladder, 5 BM this shift 8 for the day, pt refused so held PM Lactulose.      ABNL LAB/BG: Hgb stable @ 8.3.     DRAIN/DEVICES: NA     TELEMETRY RHYTHM: NA     SKIN: Blanchable redness to coccyx/buttocks. Mepilex CDI.     TESTS/PROCEDURES: NA     D/C DAY/GOALS/PLACE: Pt accepted to [facility]. Discharge tomorrow 10/18 pending insurance auth.     OTHER IMPORTANT INFO: Jaundiced skin + sclera. Abd distended, parcentesis site CDI. +2 edema to BLE, ADIS socks in place. Nursing will continue to monitor.

## 2019-10-21 NOTE — TELEPHONE ENCOUNTER
ALPRAZOLAM  Last Written Prescription Date:  na  Last Fill Quantity: NA,  # refills: NA   Last office visit: 6/19/2019 with prescribing provider: YES    Future Office Visit:      Requested Prescriptions   Pending Prescriptions Disp Refills     ALPRAZolam (XANAX) 0.5 MG tablet [Pharmacy Med Name: ALPRAZOLAM 0.5MG TABLETS] 60 tablet 0     Sig: TAKE 1 TABLET BY MOUTH TWICE DAILY AS NEEDED FOR ANXIETY       There is no refill protocol information for this order

## 2019-10-21 NOTE — TELEPHONE ENCOUNTER
Xanax not on current medication list.  Note from 6/19/19 OV:  Long hx of anxiety - she has been on xanax for years.  Discussed risk vs benefits of the medication and potential long term effects.  Discussed need to find alternative in her 60's due to risk of falls and fractures  Pt states she wants the xanax - was taking two tablets a day -   Wrote for 60 tablets monthly  She signed her Controlled Substance agreement form  She was sent to lab for tests but was wanting to skip the UDT - she was given water to drink and able to eventually leave a sample.  F/u 3 months    Your urine drug test shows the xanax and it also shows some alcohol and THC.  We can discuss at your next visit.  Return in about 3 months (around 9/19/2019) for anxiety.     Concepcion Mcintyre, DO    Needs OV   See 10/7 ED notes. Admitted to ISU. Put in SNF  Hepatic encephalopathy   Admitted to ICU, NG placed because too obtunded to take po, given IV NS and lactulose per NG, thiamine and folate replaced -- no evidence of withdrawal but  says she has been too weak to drink for several days. Transfused to units of PRBC's, and then hgb stable at 8.0, and on PPI bid.  Was seen by Avila GUILLORY, no EGD done at this time because of unstable condition, but will do EGD and colonoscopy as outpatient in 4 weeks to look for esophageal varices, and screen for colon cancer.       After several days the patient finally able to have a conversation and alert enough to eat, at which point NG removed and taking po well.  Was seen by psych who felt her problems were consistent with anxiety with depression, and agoraphobia, and agreed with Trazodone to help with sleep and continue her current antidepressant.       She may benefit from chem dep counseling in the future when her medical problems are better.      Was seen by Nephrology, FIGUEROA related to dehydration and creat 0.89 at discharge and potassium, magnesium and phosphorus normal after repeated replacement. Hgb stable  at 8.6 at discharge, and WBC 9.5, and albumin 2.0.      VM left asking patient to call back.  Needs appointment with BENNY Dasilva RN

## 2019-10-29 NOTE — PROGRESS NOTES
"Rule 25 Assessment  Background Information   1. Date of Assessment Request  2. Date of Assessment  10/29/2019 3. Date Service Authorized     4.   JOLEEN Daniel   5.  Phone Number   [phone] 6. Referent  Self 7. Assessment Site  [facility]     8. Client Name   Radha Turcios 9. Date of Birth  1960 Age  59 year old 10. Gender  female  11. PMI/ Insurance No.  [id number]   12. Client's Primary Language:  English 13. Do you require special accommodations, such as an  or assistance with written material? No   14. Current Address: [address] [address]   15. Client Phone Numbers: [phone] (home)      16. Tell me what has happened to bring you here today.    \" I am an alcoholic and my liver is getting worse due to my drinking.\"      17. Have you had other rule 25 assessments?     No    DIMENSION I - Acute Intoxication /Withdrawal Potential   1. Chemical use most recent 12 months outside a facility and other significant use history (client self-report)              X = Primary Drug Used   Age of First Use Most Recent Pattern of Use and Duration   Need enough information to show pattern (both frequency and amounts) and to show tolerance for each chemical that has a diagnosis   Date of last use and time, if needed   Withdrawal Potential? Requiring special care Method of use  (oral, smoked, snort, IV, etc)      Alcohol     21  Age 21-was not that bad at all  HU: late 80s till recentl-drinks 1.5 liter, at least 10-12 shots daily.    9/14/19 no oral      Marijuana/  Hashish   No use          Cocaine/Crack     No use          Meth/  Amphetamines   No use          Heroin     No use          Other Opiates/  Synthetics   No use          Inhalants     No use          Benzodiazepines     No use          Hallucinogens     No use          Barbiturates/  Sedatives/  Hypnotics No use          Over-the-Counter Drugs   No use          " Other     No use          Nicotine     44 1-2 cigarettes in the last 15 years 10/29/19 no smoke     2. Do you use greater amounts of alcohol/other drugs to feel intoxicated or achieve the desired effect?  Yes.  Or use the same amount and get less of an effect?  Yes.  Example: The patient reported having increased use and tolerance issues with alcohol.    3A. Have you ever been to detox?     No    3B. When was the first time?     The patient denied ever having a detoxification admission.    3C. How many times since then?     The patient denied ever having a detoxification admission.    3D. Date of most recent detox:     The patient denied ever having a detoxification admission.    4.  Withdrawal symptoms: Have you had any of the following withdrawal symptoms?  Past 12 months Recent (past 30 days)   None Shaky / Jittery / Tremors  Muscle Aches  Dizziness  Anxiety / Worried     's Visual Observations and Symptoms: No visible withdrawal symptoms at this time    Based on the above information, is withdrawal likely to require attention as part of treatment participation?  No    Dimension I Ratings   Acute intoxication/Withdrawal potential - The placing authority must use the criteria in Dimension I to determine a client s acute intoxication and withdrawal potential.    RISK DESCRIPTIONS - Severity ratin Client displays full functioning with good ability to tolerate and cope with withdrawal discomfort. No signs or symptoms of intoxication or withdrawal or resolving signs or symptoms.    REASONS SEVERITY WAS ASSIGNED (What about the amount of the person s use and date of most recent use and history of withdrawal problems suggests the potential of withdrawal symptoms requiring professional assistance? )     Pt reports drug of choice is alcohol, inability to cease all use on her own and last use was 20. Pt displays no signs or symptoms of withdrawal or intoxication at this time.         DIMENSION II -  Biomedical Complications and Conditions   1a. Do you have any current health/medical conditions?(Include any infectious diseases, allergies, or chronic or acute pain, history of chronic conditions)       Yes.   Illnesses/Medical Conditions you are receiving care for: Liver disease, lower back pain.    1b. On a scale of mild, moderate to severe please specify the severity of the patient's diabetes and/or neuropathy.    The patient rated the severity of her alcohol as mild.    2. Do you have a health care provider? When was your most recent appointment? What concerns were identified?     The patient's PCP is Caty Monteiro. Last him in June 2019, renewed prescription.     3. If indicated by answers to items 1 or 2: How do you deal with these concerns? Is that working for you? If you are not receiving care for this problem, why not?      The patient denied having any current clinical health issues.    4A. List current medication(s) including over-the-counter or herbal supplements--including pain management:     Current Outpatient Medications   Medication     multivitamin, therapeutic (THERA-VIT) TABS tablet     omeprazole (PRILOSEC) 20 MG DR capsule     ondansetron (ZOFRAN-ODT) 4 MG ODT tab     spironolactone (ALDACTONE) 25 MG tablet     vitamin D3 (CHOLECALCIFEROL) 2000 units (50 mcg) tablet     Cyanocobalamin (B-12) 1000 MCG TBCR     lactulose (CHRONULAC) 10 GM/15ML solution     traZODone (DESYREL) 50 MG tablet     vilazodone (VIIBRYD) 40 MG TABS tablet     No current facility-administered medications for this encounter.        4B. Do you follow current medical recommendations/take medications as prescribed?     Yes    4C. When did you last take your medication?     Today    4D. Do you need a referral to have a follow up with a primary care physician?    No.    5. Has a health care provider/healer ever recommended that you reduce or quit alcohol/drug use?     No    6. Are you pregnant?     No    7. Have  you had any injuries, assaults/violence towards you, accidents, health related issues, overdose(s) or hospitalizations related to your use of alcohol or other drugs:     Yes, explain: Falls many times    8. Do you have any specific physical needs/accommodations? No    Dimension II Ratings   Biomedical Conditions and Complications - The placing authority must use the criteria in Dimension II to determine a client s biomedical conditions and complications.   RISK DESCRIPTIONS - Severity ratin Client tolerates and chuyita with physical discomfort and is able to get the services that the client needs.    REASONS SEVERITY WAS ASSIGNED (What physical/medical problems does this person have that would inhibit his or her ability to participate in treatment? What issues does he or she have that require assistance to address?)    Pt presents with liver disease and chronic back pain, and has PCP and access to medical services.          DIMENSION III - Emotional, Behavioral, Cognitive Conditions and Complications   1. (Optional) Tell me what it was like growing up in your family. (substance use, mental health, discipline, abuse, support)     Patient reports growing up was awesome, has 6 bothers and 4 sisters; 6 siblings passed away. Patient reports that she was raised by both parents, reports NATHAN from her father's, most of them drinks/NATHAN; No abuse in the family.     2. When was the last time that you had significant problems...  A. with feeling very trapped, lonely, sad, blue, depressed or hopeless  about the future? Never    B. with sleep trouble, such as bad dreams, sleeping restlessly, or falling  asleep during the day? Never    C. with feeling very anxious, nervous, tense, scared, panicked, or like  something bad was going to happen? 2 - 12 months ago    D. with becoming very distressed and upset when something reminded  you of the past? Never    E. with thinking about ending your life or committing suicide? Never    3.  When was the last time that you did the following things two or more times?  A. Lied or conned to get things you wanted or to avoid having to do  something? Never    B. Had a hard time paying attention at school, work, or home? Never    C. Had a hard time listening to instructions at school, work, or home? 2 - 12 months ago    D. Were a bully or threatened other people? Never    E. Started physical fights with other people? Never    Note: These questions are from the Global Appraisal of Individual Needs--Short Screener. Any item marked  past month  or  2 to 12 months ago  will be scored with a severity rating of at least 2.     For each item that has occurred in the past month or past year ask follow up questions to determine how often the person has felt this way or has the behavior occurred? How recently? How has it affected their daily living? And, whether they were using or in withdrawal at the time?    The patient did not identify as having any of the above items in the past month.    4A. If the person has answered item 2E with  in the past year  or  the past month , ask about frequency and history of suicide in the family or someone close and whether they were under the influence.     The patient denied any family member or someone close to the patient had ever completed suicide.    Any history of suicide in your family? Or someone close to you?     The patient denied any family member or someone close to the patient had ever completed suicide.    4B. If the person answered item 2E  in the past month  ask about  intent, plan, means and access and any other follow-up information  to determine imminent risk. Document any actions taken to intervene  on any identified imminent risk.      The patient denied having any suicide ideation within the past month.    5A. Have you ever been diagnosed with a mental health problem?     Yes, explain: Dx of panic in mid 90s, and taking alprazolam as needed.       5B. Are you  receiving care for any mental health issues? If yes, what is the focus of that care or treatment?  Are you satisfied with the service? Most recent appointment?  How has it been helpful?     The patient denied having any current or past mental health issues that had required treatment.    6. Have you been prescribed medications for emotional/psychological problems?     Yes, Alprazolam PRN    7. Does your MH provider know about your use?     No    8A. Have you ever been verbally, emotionally, physically or sexually abused?      No     Follow up questions to learn current risk, continuing emotional impact.      The patient denied having any history of being verbally, emotionally, physically or sexually abused.    8B. Have you received counseling for abuse?      No    9. Have you ever experienced or been part of a group that experienced community violence, historical trauma, rape or assault?     No    10A. [place]:    No    11. Do you have problems with any of the following things in your daily life?    Dizziness and Concentration      Note: If the person has any of the above problems, follow up with items 12, 13, and 14. If none of the issues in item 11 are a problem for the person, skip to item 15.    Patient reports that his dizziness and concentration maybe related to his drinking.    12. Have you been diagnosed with traumatic brain injury or Alzheimer s?  No    13. If the answer to #12 is no, ask the following questions:    Have you ever hit your head or been hit on the head? No    Were you ever seen in the Emergency Room, hospital or by a doctor because of an injury to your head? No    Have you had any significant illness that affected your brain (brain tumor, meningitis, West Nile Virus, stroke or seizure, heart attack, near drowning or near suffocation)? No    14. If the answer to #12 is yes, ask if any of the problems identified in #11 occurred since the head injury or loss of oxygen. No    15A. Highest grade  "of school completed:     High school graduate/GED    15B. Do you have a learning disability? No    15C. Did you ever have tutoring in Math or English? No    15D. Have you ever been diagnosed with Fetal Alcohol Effects or Fetal Alcohol Syndrome? No    16. If yes to item 15 B, C, or D: How has this affected your use or been affected by your use?     No    Dimension III Ratings   Emotional/Behavioral/Cognitive - The placing authority must use the criteria in Dimension III to determine a client s emotional, behavioral, and cognitive conditions and complications.   RISK DESCRIPTIONS - Severity ratin Client has difficulty with impulse control and lacks coping skills. Client has thoughts of suicide or harm to others without means; however, the thoughts may interfere with participation in some treatment activities. Client has difficulty functioning in significant life areas. Client has moderate symptoms of emotional, behavioral, or cognitive problems. Client is able to participate in most treatment activities.    REASONS SEVERITY WAS ASSIGNED - What current issues might with thinking, feelings or behavior pose barriers to participation in a treatment program? What coping skills or other assets does the person have to offset those issues? Are these problems that can be initially accommodated by a treatment provider? If not, what specialized skills or attributes must a provider have?      Pt lives at home with her  who is in recovery, has family support. Pt reports does not involve in any self-help groups or legal issue. Pt lacks sober support at this time.         DIMENSION IV - Readiness for Change   1. You ve told me what brought you here today. (first section) What do you think the problem really is?      \"Mostly alcohol problem\"    2. Tell me how things are going. Ask enough questions to determine whether the person has use related problems or assets that can be built upon in the following areas: " "Family/friends/relationships; Legal; Financial; Emotional; Educational; Recreational/ leisure; Vocational/employment; Living arrangements (DSM)       \"Better I finally I get to tomorrow, great family support, retired and living with her .\"    3. What activities have you engaged in when using alcohol/other drugs that could be hazardous to you or others (i.e. driving a car/motorcycle/boat, operating machinery, unsafe sex, sharing needles for drugs or tattoos, etc     The patient denied engaging in any of the above dangerous activities when using alcohol and/or drugs.    4. How much time do you spend getting, using or getting over using alcohol or drugs? (DSM)     \"daily once I start drinking    5. Reasons for drinking/drug use (Use the space below to record answers. It may not be necessary to ask each item.)  Like the feeling Yes   Trying to forget problems Yes   To cope with stress Yes   To relieve physical pain No   To cope with anxiety Yes   To cope with depression Yes   To relax or unwind Yes   Makes it easier to talk with people No   Partner encourages use Yes   Most friends drink or use No   To cope with family problems No   Afraid of withdrawal symptoms/to feel better Yes   Other (specify)  No     A. What concerns other people about your alcohol or drug use/Has anyone told you that you use too much? What did they say? (DSM)      \" , does not want it, drinks to cope with anxiety,blackouts few times.\"    B. What did you think about that/ do you think you have a problem with alcohol or drug use?      \"Absolutely, I agree with my  concerns, I do have an alcohol problem.\"    6. What changes are you willing to make? What substance are you willing to stop using? How are you going to do that? Have you tried that before? What interfered with your success with that goal?       \"Anything I can to get through thing and put my alcoholism in to remission; absolutely, go back life and responsibility at " "home.   Maybe once or twice, I don't think so.\"    7. What would be helpful to you in making this change?     \"Outpatient treatment\"    Dimension IV Ratings   Readiness for Change - The placing authority must use the criteria in Dimension IV to determine a client s readiness for change.   RISK DESCRIPTIONS - Severity ratin Client is motivated with active reinforcement, to explore treatment and strategies for change, but ambivalent about illness or need for change.    REASONS SEVERITY WAS ASSIGNED - (What information did the person provide that supports your assessment of his or her readiness to change? How aware is the person of problems caused by continued use? How willing is she or he to make changes? What does the person feel would be helpful? What has the person been able to do without help?)      Pt reports diagnosis of panic attack, taking meds alprazolam PRN. Client reports little success staying sober as evidence by continued use. Pt reports good upbringing, no abuse in the family. Pt reports no suicidal ideations or intention to harm himself or others at this time.         DIMENSION V - Relapse, Continued Use, and Continued Problem Potential   1A. In what ways have you tried to control, cut-down or quit your use? If you have had periods of sobriety, how did you accomplish that? What was helpful? What happened to prevent you from continuing your sobriety? (DSM)     \"Obsvuiously not very much, tried to drink less, cut the amount,     1B. What were the circumstances of your most recent relapse with mood altering chemicals?    \"A lot worrying, anxiety    2. Have you experienced cravings? If yes, ask follow up questions to determine if the person recognizes triggers and if the person has had any success in dealing with them.     The patient reported having some infrequent cravings to use mood altering chemicals in the past.    3. Have you been treated for alcohol/other drug abuse/dependence? No    4. Support " "group participation: Have you/do you attend support group meetings to reduce/stop your alcohol/drug use? How recently? What was your experience? Are you willing to restart? If the person has not participated, is he or she willing?     No affiliation or prior involvement to self-help groups.     5. What would assist you in staying sober/straight?     Outpatient and family support       Dimension V Ratings   Relapse/Continued Use/Continued problem potential - The placing authority must use the criteria in Dimension V to determine a client s relapse, continued use, and continued problem potential.   RISK DESCRIPTIONS - Severity rating: 3 Client has poor recognition and understanding of relapse and recidivism issues and displays moderately high vulnerability for further substance use or mental health problems. Client has few coping skills and rarely applies coping skills.    REASONS SEVERITY WAS ASSIGNED - (What information did the person provide that indicates his or her understanding of relapse issues? What about the person s experience indicates how prone he or she is to relapse? What coping skills does the person have that decrease relapse potential?)      Pt admits her drinking as problematic to self, not having sober coping skills and inability to intervene in his use of illicit drugs. Pt is the contemplation stage of change and appears willing to explore treatment to change.         DIMENSION VI - Recovery Environment   1. Are you employed/attending school? Tell me about that.     \" have not worked in a long time in 1988    2A. Describe a typical day; evening for you. Work, school, social, leisure, volunteer, spiritual practices. Include time spent obtaining, using, recovering from drugs or alcohol. (DSM)      \"Get up; get something to eat, take a nap, get up between 3-4 and start drinking till 2 or 3 am\"    Please describe what leisure activities have been associated with your substance abuse:     Isolated when " "drnking    2B. How often do you spend more time than you planned using or use more than you planned? (DSM)     Definitely for the part    3. How important is using to your social connections? Do many of your family or friends use?     \"Not important, patient dies alone.\"    4A. Are you currently in a significant relationship?     Yes.  4B. How long? 33 years            Please describe your significant other's use of mood altering chemicals? 'My  is a recovering alcoholic\"    4C. Sexual Orientation:     Heterosexual    5A. Who do you live with?      Patient lives with her .    5B. Tell me about their alcohol/drug use and mental health issues.      is in recovery from alcohol.    5C. Are you concerned for your safety there? No    5D. Are you concerned about the safety of anyone else who lives with you? No    6A. Do you have children who live with you?     No    6B. Do you have children who do not live with you?     No    7A. Who supports you in making changes in your alcohol or drug use? What are they willing to do to support you? Who is upset or angry about you making changes in your alcohol or drug use? How big a problem is this for you?      \"My sister and my  are my two angels.\"    7B. This table is provided to record information about the person s relationships and available support It is not necessary to ask each item; only to get a comprehensive picture of their support system.  How often can you count on the following people when you need someone?   Partner / Spouse Always supportive   Parent(s)/Aunt(s)/Uncle(s)/Grandparents The patient's parents and other family members are .   Sibling(s)/Cousin(s) Always supportive   Child(eusebio) The patient doesn't have any children.   Other relative(s) Always supportive   Friend(s)/neighbor(s) Usually supportive   Child(eusebio) s father(s)/mother(s) The patient doesn't have any children.   Support group member(s) The patient denied having any " "current involvement with 12-step or other support group meetings.   Community of [place] members Usually supportive   /counselor/therapist/healer Usually supportive   Other (specify) No     8A. What is your current living situation?     Patient lives with her .    8B. What is your long term plan for where you will be living?      \"Live in the same place for 20 years, we'd thought about it\"    8C. Tell me about your living environment/neighborhood? Ask enough follow up questions to determine safety, criminal activity, availability of alcohol and drugs, supportive or antagonistic to the person making changes.      Safe living environment and neighborhood.    9. Criminal justice history: Gather current/recent history and any significant history related to substance use--Arrests? Convictions? Circumstances? Alcohol or drug involvement? Sentences? Still on probation or parole? Expectations of the court? Current court order? Any sex offenses - lifetime? What level? (DSM)    None    10. What obstacles exist to participating in treatment? (Time off work, childcare, funding, transportation, pending [facility] time, living situation)     The patient denied having any obstacles for participating in substance abuse treatment.    Dimension VI Ratings   Recovery environment - The placing authority must use the criteria in Dimension VI to determine a client s recovery environment.   RISK DESCRIPTIONS - Severity ratin Client is engaged in structured, meaningful activity, but peers, family, significant other, and living environment are unsupportive, or there is criminal justice involvement by the client or among the client's peers, significant others, or in the client's living environment.    REASONS SEVERITY WAS ASSIGNED - (What support does the person have for making changes? What structure/stability does the person have in his or her daily life that will increase the likelihood that changes can be sustained? What " problems exist in the person s environment that will jeopardize getting/staying clean and sober?)     Pt reports no prior treatment episodes or sobriety time.  Pt reports limited understanding of relapse prevention, and needs additional coping skills to arrest her use.          Client Choice/Exceptions   Would you like services specific to language, age, gender, culture, Congregation preference, race, ethnicity, sexual orientation or disability?  No    What particular treatment choices and options would you like to have? Outpatient    Do you have a preference for a particular treatment program? [facility] in [place].    Criteria for Diagnosis     Criteria for Diagnosis  DSM-5 Criteria for Substance Use Disorder  Instructions: Determine whether the client currently meets the criteria for Substance Use Disorder using the diagnostic criteria in the DSM-V pp.481-580. Current means during the most recent 12 months outside a facility that controls access to substances    Category of Substance Severity (ICD-10 Code / DSM 5 Code)     Alcohol Use Disorder Severe  (10.20) (303.90)   Cannabis Use Disorder The patient does not meet the criteria for a Cannabis use disorder.   Hallucinogen Use Disorder The patient does not meet the criteria for a Hallucinogen use disorder.   Inhalant Use Disorder The patient does not meet the criteria for an Inhalant use disorder.   Opioid Use Disorder The patient does not meet the criteria for an Opioid use disorder.   Sedative, Hypnotic, or Anxiolytic Use Disorder The patient does not meet the criteria for a Sedative/Hypnotic use disorder.   Stimulant Related Disorder The patient does not meet the criteria for a Stimulant use disorder.   Tobacco Use Disorder The patient does not meet the criteria for a Tobacco use disorder.   Other (or unknown) Substance Use Disorder The patient does not meet the criteria for a Other (or unknown) Substance use disorder.       Collateral Contact Summary    Number of contacts made: None    Contact with referring person:  No    If court related records were reviewed, summarize here: No court records had been reviewed at the time of this documentation.    Information from collateral contacts supported/largely agreed with information from the client and associated risk ratings.      Rule 25 Assessment Summary and Plan   's Recommendation    Complete Caty RODRIGUEZ in [place]      Collateral Contacts     Name:    CHIO   Relationship:    NA   Phone Number:    NA Releases:    No     NA      Collateral Contacts     Name:    NA   Relationship:    NA   Phone Number:    NA   Releases:    No     NA    ollateral Contacts      A problematic pattern of alcohol/drug use leading to clinically significant impairment or distress, as manifested by at least two of the following, occurring within a 12-month period:    1.) Alcohol/drug is often taken in larger amounts or over a longer period than was intended.  2.) There is a persistent desire or unsuccessful efforts to cut down or control alcohol/drug use  3.) A great deal of time is spent in activities necessary to obtain alcohol, use alcohol, or recover from its effects.  4.) Craving, or a strong desire or urge to use alcohol/drug  6.) Continued alcohol use despite having persistent or recurrent social or interpersonal problems caused or exacerbated by the effects of alcohol/drug.  7.) Important social, occupational, or recreational activities are given up or reduced because of alcohol/drug use.  8.) Recurrent alcohol/drug use in situations in which it is physically hazardous.  9.) Alcohol/drug use is continued despite knowledge of having a persistent or recurrent physical or psychological problem that is likely to have been caused or exacerbated by alcohol.  10.) Tolerance, as defined by either of the following: A need for markedly increased amounts of alcohol/drug to achieve intoxication or desired effect.      Specify if: In  early remission:  After full criteria for alcohol/drug use disorder were previously met, none of the criteria for alcohol/drug use disorder have been met for at least 3 months but for less than 12 months (with the exception that Criterion A4,  Craving or a strong desire or urge to use alcohol/drug  may be met).     In sustained remission:   After full criteria for alcohol use disorder were previously met, non of the criteria for alcohol/drug use disorder have been met at any time during a period of 12 months or longer (with the exception that Criterion A4,  Craving or strong desire or urge to use alcohol/drug  may be met).   Specify if:   This additional specifier is used if the individual is in an environment where access to alcohol is restricted.    Mild: Presence of 2-3 symptoms  Moderate: Presence of 4-5 symptoms  Severe: Presence of 6 or more symptoms
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
## 2019-11-01 NOTE — PROGRESS NOTES
Subjective     Radha Turcios is a 59 year old female who presents to clinic today for the following health issues:    [facility]       Hospital Follow-up Visit: she was in the ER because of hepatic encephalopathy and abdominal distention with ascitis, confusion- she had  hgb of 7.9, high ammonia levels, creatitnin 4.56, potassium 2.8   She was given IV fluids, lactulose per NG , folate and thiamine , was transfused two units of PRBC , started on PPI bid  ,she saw GI but they did not do EGD because pt was not stable- recommended to do as an outpatient the EGD and colonoscopy as well.  Pt has anxiety and depression with agoraphobia . She is on antidepressant and uses melatonin and trazodone to help with her sleep .  Hospital/Nursing Home/IP Rehab Facility: [facility]  Date of Admission: 10/07/2019  Date of Discharge: 10/18/2019  Reason(s) for Admission: Liver issues             Problems taking medications regularly:  None       Medication changes since discharge: None       Problems adhering to non-medication therapy:  None    Summary of hospitalization:  [facility] discharge summary reviewed  Diagnostic Tests/Treatments reviewed.  Follow up needed: GI hepatology   Other Healthcare Providers Involved in Patient s Care:         None  Update since discharge: improved.     Post Discharge Medication Reconciliation: discharge medications reconciled, continue medications without change.  Plan of care communicated with patient     Coding guidelines for this visit:  Type of Medical   Decision Making Face-to-Face Visit       within 7 Days of discharge Face-to-Face Visit        within 14 days of discharge   Moderate Complexity [id number]   High Complexity [id number]                Patient Active Problem List   Diagnosis     Mixed hyperlipidemia     GERD (gastroesophageal reflux disease)     Anxiety     Insomnia     Controlled substance agreement signed       Panic disorder with agoraphobia, severe  agoraphobic avoidance and mild panic attacks     Smoker     Episode of recurrent major depressive disorder, unspecified depression episode severity (H)     Abnormal TSH     Hepatic encephalopathy (H)     Alcoholic cirrhosis of liver with ascites (H)     ARF (acute renal failure) (H)     Distal left Clavicle Fracture     Lactic acidosis     Hypokalemia     Anemia     Coagulopathy (H)     Hypomagnesemia     Thrombocytopenia (H)     Acute respiratory failure with hypoxia (H)     GI bleed     Past Surgical History:   Procedure Laterality Date     NO HISTORY OF SURGERY         Social History     Tobacco Use     Smoking status: Current Every Day Smoker     Packs/day: 1.00     Years: 23.00     Pack years: 23.00     Types: Cigarettes     Smokeless tobacco: Never Used   Substance Use Topics     Alcohol use: Yes     Alcohol/week: 0.0 standard drinks     Comment: 4-5 drinks per week     Family History   Problem Relation Age of Onset     Lipids Mother      Heart Disease Mother      Alzheimer Disease Mother      Allergies Mother      C.A.D. Mother         had MI     Cancer Father 58        pancreatic or prostate?     Heart Disease Brother      C.A.D. Brother         2 brothers  of heart attacks/ one also had lung caner     Cancer Brother         2 brothers   of lung cancer     Cancer Sister         lung     Alcohol/Drug Sister          alcoholic cirrhosis     Depression Sister         anxiety disorder     Depression Brother         anxiety disorder     Colon Cancer Sister 51     Breast Cancer Sister          Current Outpatient Medications   Medication Sig Dispense Refill     Cyanocobalamin (B-12) 1000 MCG TBCR Take 1,000 mcg by mouth daily 100 tablet 1     folic acid (FOLVITE) 1 MG tablet Take 1 tablet (1 mg) by mouth daily 20 tablet 0     lactulose (CEPHULAC) 10 GM packet Take 2 packets (20 g) by mouth 2 times daily Hold if the patient has had more than 4 loose stools 60 packet 1     lactulose (CHRONULAC) 10 GM/15ML  solution Take 30 mLs (20 g) by mouth 2 times daily 500 mL 3     melatonin 3 MG tablet Take 1 tablet (3 mg) by mouth nightly as needed for sleep 30 tablet 3     multivitamin, therapeutic (THERA-VIT) TABS tablet Take 1 tablet by mouth daily       omeprazole (PRILOSEC) 20 MG DR capsule Take 1 capsule (20 mg) by mouth daily 90 capsule 0     ondansetron (ZOFRAN-ODT) 4 MG ODT tab Take 1 tablet (4 mg) by mouth every 6 hours as needed for nausea or vomiting       spironolactone (ALDACTONE) 25 MG tablet Take 1 tablet (25 mg) by mouth daily 60 tablet 3     spironolactone (ALDACTONE) 25 MG tablet Take 1 tablet (25 mg) by mouth 2 times daily  0     traZODone (DESYREL) 50 MG tablet Take 1 tablet (50 mg) by mouth At Bedtime One and half tablets at bedtime 45 tablet 3     vilazodone (VIIBRYD) 40 MG TABS tablet Take 1 tablet (40 mg) by mouth daily 90 tablet 0     vitamin D3 (CHOLECALCIFEROL) 2000 units (50 mcg) tablet Take 1 tablet (2,000 Units) by mouth daily 30 tablet 3     Allergies   Allergen Reactions     Amoxicillin      Rash     Recent Labs   Lab Test 11/01/19  1358 10/17/19  0835 10/16/19  0858 10/15/19  0757  10/06/19  1852 06/19/19  1044 10/10/18  1224  10/02/17  0934   LDL  --   --   --   --   --   --  36 48  --  42   HDL  --   --   --   --   --   --  30* 15*  --  12*   TRIG  --   --   --   --   --   --  75 157*  --  179*   ALT  --  32 37 36   < > 30 41 22   < > 24   CR 1.21* 0.71 0.71  --    < > 4.26* 0.54 0.66  --  0.64   GFRESTIMATED 49* >90 >90  --    < > 11*  9* >90 >90  --  >90   GFRESTBLACK 57* >90 >90  --    < > 12*  11* >90 >90  --  >90   POTASSIUM 3.7 3.6 3.6  --    < > 2.8*  2.8* 4.2 3.9  --  4.0   TSH  --   --   --   --   --  2.20 4.58*  --    < > 5.06*    < > = values in this interval not displayed.      BP Readings from Last 3 Encounters:   11/01/19 109/64   10/18/19 97/59   10/06/19 102/54    Wt Readings from Last 3 Encounters:   11/01/19 72.5 kg (159 lb 12.8 oz)   10/15/19 62 kg (136 lb 9.6 oz)    06/19/19 65.9 kg (145 lb 3.2 oz)                      Reviewed and updated as needed this visit by Provider         Review of Systems   ROS COMP: Constitutional, HEENT, cardiovascular, pulmonary, GI, , musculoskeletal, neuro, skin, endocrine and psych systems are negative, except as otherwise noted.      Objective    LMP 09/14/2001   There is no height or weight on file to calculate BMI.  Physical Exam   GENERAL: healthy, alert and no distress but appears a bit restless, with frequent movement form the chair up and then sitting down, picking up lint from her clothes the whole time.  EYES: Eyes grossly normal to inspection, PERRL and conjunctivae and sclerae normal  NECK: no adenopathy, no asymmetry, masses, or scars and thyroid normal to palpation  RESP: lungs clear to auscultation - no rales, rhonchi or wheezes  CV: regular rate and rhythm, normal S1 S2, no S3 or S4, no murmur, click or rub, no peripheral edema and peripheral pulses strong  ABDOMEN: there is mild ascitic enlargement some tenderness  , no masses and bowel sounds normal  MS: no gross musculoskeletal defects noted, there is one plus pitting edema in the calves filippo.    Diagnostic Test Results:  Labs reviewed in Epic  Results for orders placed or performed in visit on 11/01/19   Basic metabolic panel  (Ca, Cl, CO2, Creat, Gluc, K, Na, BUN)     Status: Abnormal   Result Value Ref [place]    Sodium 137 133 - 144 mmol/L    Potassium 3.7 3.4 - 5.3 mmol/L    Chloride 105 94 - 109 mmol/L    Carbon Dioxide 21 20 - 32 mmol/L    Anion Gap 11 3 - 14 mmol/L    Glucose 74 70 - 99 mg/dL    Urea Nitrogen 22 7 - 30 mg/dL    Creatinine 1.21 (H) 0.52 - 1.04 mg/dL    GFR Estimate 49 (L) >60 mL/min/[1.73_m2]    GFR Estimate If Black 57 (L) >60 mL/min/[1.73_m2]    Calcium 8.8 8.5 - 10.1 mg/dL   CBC with platelets     Status: Abnormal   Result Value Ref [place]    WBC 8.3 4.0 - 11.0 10e9/L    RBC Count 2.51 (L) 3.8 - 5.2 10e12/L    Hemoglobin 8.7 (L) 11.7 - 15.7 g/dL     Hematocrit 27.0 (L) 35.0 - 47.0 %     (H) 78 - 100 fl    MCH 34.7 (H) 26.5 - 33.0 pg    MCHC 32.2 31.5 - 36.5 g/dL    RDW 17.3 (H) 10.0 - 15.0 %    Platelet Count 178 150 - 450 10e9/L           Assessment & Plan     1. Alcoholic cirrhosis of liver with ascites (H)  Will recheck BMP and CBC , Hgb is now 8.7 , she needs to see GI for the EGD and referral given , also needs to see hepatology as she does have cirrhosis, continue meds as per the rehab facility .  - Basic metabolic panel  (Ca, Cl, CO2, Creat, Gluc, K, Na, BUN)  - CBC with platelets  - GASTROENTEROLOGY ADULT REF CONSULT ONLY  - folic acid (FOLVITE) 1 MG tablet; Take 1 tablet (1 mg) by mouth daily  Dispense: 20 tablet; Refill: 0  - spironolactone (ALDACTONE) 25 MG tablet; Take 1 tablet (25 mg) by mouth daily  Dispense: 60 tablet; Refill: 3  - vitamin D3 (CHOLECALCIFEROL) 2000 units (50 mcg) tablet; Take 1 tablet (2,000 Units) by mouth daily  Dispense: 30 tablet; Refill: 3    2. Hepatic encephalopathy (H)  As above , refilled her lactulose , she would need to come for a f/u in two weeks to monitor edema and Creatinine   - lactulose (CEPHULAC) 10 GM packet; Take 2 packets (20 g) by mouth 2 times daily Hold if the patient has had more than 4 loose stools  Dispense: 60 packet; Refill: 1    3. Iron deficiency anemia, unspecified iron deficiency anemia type  Will continue with iron daily recheck CBC in two weeks.  - CBC with platelets    4. Anxiety  She is on Viibryd and also trazodone to help her sleep at night    5. Insomnia, unspecified type  Takes trazodone and melatonin 3mg at HS   - melatonin 3 MG tablet; Take 1 tablet (3 mg) by mouth nightly as needed for sleep  Dispense: 30 tablet; Refill: 3  - traZODone (DESYREL) 50 MG tablet; Take 1 tablet (50 mg) by mouth At Bedtime One and half tablets at bedtime  Dispense: 45 tablet; Refill: 3     Discussed with the pt and her  that if the abdominal girth increases because of the retained fluid (  ascitis ) she would need to go to the ER for the draining of the fluid.    Brooklyn Vega MD  [facility]

## 2019-11-05 NOTE — TELEPHONE ENCOUNTER
LVM for pt to call back to triage    Rosuvastatin no longer on active med list. Was d/c'd by PN on 6/19/19. Is pt still taking med and request refill or did pharmacy request refill?

## 2019-11-05 NOTE — TELEPHONE ENCOUNTER
"Last Written Prescription Date:  MED NOT ON CURRENT MED LIST  Last Fill Quantity: NA,  # refills: NA   Last office visit: 11/1/2019 with prescribing provider:  PCP   Future Office Visit:      Requested Prescriptions   Pending Prescriptions Disp Refills     rosuvastatin (CRESTOR) 40 MG tablet [Pharmacy Med Name: ROSUVASTATIN TABS 40MG] 45 tablet 4     Sig: TAKE ONE-HALF (1/2) TABLET DAILY       Statins Protocol Failed - 11/3/2019  6:21 AM        Failed - Medication is active on med list        Passed - LDL on file in past 12 months     Recent Labs   Lab Test 06/19/19  1044   LDL 36             Passed - No abnormal creatine kinase in past 12 months     No lab results found.             Passed - Recent (12 mo) or future (30 days) visit within the authorizing provider's specialty     Patient has had an office visit with the authorizing provider or a provider within the authorizing providers department within the previous 12 mos or has a future within next 30 days. See \"Patient Info\" tab in inbasket, or \"Choose Columns\" in Meds & Orders section of the refill encounter.              Passed - Patient is age 18 or older        Passed - No active pregnancy on record        Passed - No positive pregnancy test in past 12 months      Future refills by PCP Dr. Concepcion Mcintyre with phone number [phone].  "
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
## 2019-11-11 NOTE — PROGRESS NOTES
Sepsis Evaluation Progress Note    I was called to see Radha Turcios due to abnormal vital signs triggering the Sepsis SIRS screening alert. She is known to have an infection.     Physical Exam   Vital Signs:  Temp: 99.4  F (37.4  C) Temp src: Oral BP: (!) 82/42 Pulse: 101   Resp: 18 SpO2: 100 % O2 Device: None (Room air)      Lab:  Lactic Acid   Date Value Ref [place] Status   10/06/2019 4.4 (HH) 0.7 - 2.0 mmol/L Final     Comment:     Critical Value called to and read back by  REFUGIO BENTLEY (ERA) @ 1906 10.6.19, BV       Lactate for Sepsis Protocol   Date Value Ref [place] Status   11/11/2019 2.1 (H) 0.7 - 2.0 mmol/L Final     Comment:     Significant value called to and read back by  DENNIS STONE ON MS6 @ 1520 11.11.19 LNP         The patient is at baseline mental status.     The rest of their physical exam is significant for mild tachycardia    Assessment & Plan   NO EVIDENCE OF SEPSIS at this time.  Vital sign, physical exam, and lab findings are likely due to hypovolemia.     Give 25 g IV albumin now.  Give 500 cc normal saline IV fluid bolus.    Disposition: The patient will remain on the current unit. We will continue to monitor this patient closely.  Pritesh Esquivel, DO    Sepsis Criteria   Sepsis: 2+ SIRS criteria due to infection  Severe Sepsis: Sepsis AND 1+ new sign of acute organ dysfunction (Note: lactate >2 is organ dysfunction)  Septic Shock: Sepsis AND hypotension despite volume resuscitation with 30 ml/kg crystalloid
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
## 2019-11-11 NOTE — PROGRESS NOTES
Jorge - brother called very upset. Patient is confidential in epic. We cannot give out any information to the family member. The patient Radha gave me verbal consent to let Jorge and Jade know she is here. Jorge made aware patient here as a patient.

## 2019-11-11 NOTE — PROVIDER NOTIFICATION
Web based paged: BRAEDEN SPEARS results available. Please advise if you'd like to do anything this AM. Otherwise will pass along. Thanks!

## 2019-11-11 NOTE — CONSULTS
See dictation.    ARF in the setting of advanced alcoholic liver disease.  Previous bout of ARF last month, which reversed over several days and was presumed to be pre-renal, due to volume depletion.  Similar concern now.  Received both IV fluid and furosemide overnight and appears to have UO in non-oliguric range.  Eating well today.  Somewhat improved creatinine this AM.  Note UA suggests UTI; receiving empiric antibiotic therapy.  FENA ordered; result pending.      VS stable except for soft BP.  Intravascular volume status seems satisfactory now.  Await further lab data, especially urine chemistries.  Agree with low salt diet but no other restriction.  No further diuretics for now.    Thanks.    Edgard Mcghee MD  Nephrology; [facility]  [phone]
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
## 2019-11-11 NOTE — PLAN OF CARE
OT: Eval attempted. Per discussion with RN, pt currently with low BP and dizziness. Will re-schedule eval.     PM attempt - BP remains low with continued dizziness/fatigue. Pt requesting to re-schedule therapy evals.

## 2019-11-11 NOTE — PLAN OF CARE
Up with assist of one using walker. Pivot to commode. Oriented x4 with racing thoughts and rapid speech, occasionally illogical. Un-coordinated and unable to focus on task. Voiding, +UA with IV rocephin ordered. Lactulose and diuretics. Small BM. Electrolyte re-draw WNL. Soft BP's, asymptomatic per patient. Private patient request. Chronic back pain managed with 1x dose of Tramadol, heat, and ice. Takes ibuprofen at home, but Tylenol ordered. See sticky note.

## 2019-11-11 NOTE — PROVIDER NOTIFICATION
Results for ESTUARDO JARRETT (MRN [id number]) as of 11/11/2019 15:20   Ref. Range 11/11/2019 14:58   Lactate for Sepsis Protocol Latest Ref Range: 0.7 - 2.0 mmol/L 2.1 (H)     Paged MD critical lab, update on VS. MD ord 500 ml Bolus and albumin.

## 2019-11-11 NOTE — PLAN OF CARE
PT: Orders received and chart reviewed. Pt with dizziness and low BP 81/44, will hold PT eval this day.

## 2019-11-11 NOTE — PROVIDER NOTIFICATION
Web based paged: Takes ibuprofen at home d/t cirrhosis. Can we get that PRN? Also, high anxiety, can we have Ativan PRN?

## 2019-11-11 NOTE — PROGRESS NOTES
Paged by nursing staff as patient requesting for ibuprofen as she takes it usually at home for pain.  Notably elevated creatinine and history of cirrhosis.  Ibuprofen will not be ideal.  Will offer tramadol once.  Despite notable history of anxiety, we will not offer benzodiazepine for now as patient is already being resumed on trazodone 50 mg p.o. nightly along with vilazodone 40 mg p.o. daily and given history of cirrhosis and hepatic encephalopathy, will defer on further benzodiazepines.

## 2019-11-11 NOTE — PHARMACY-ADMISSION MEDICATION HISTORY
Admission medication history interview status for this patient is complete. See [facility] admission navigator for allergy information, prior to admission medications and immunization status.     Medication history interview source(s):Patient's    Medication history resources: written lists,  Primary pharmacy: [facility]     Changes made to PTA medication list:  Added: fish oil 2 grams daily    Deleted: duplicate lactulose 20 gram packets. Duplicate Spironolactone  Changed: melatonin from 3 mg daily PRN to 6 mg HS     Actions taken by pharmacist (provider contacted, etc): Left stick note message to provider     Additional medication history information:None    Medication reconciliation/reorder completed by provider prior to medication history?  No     Do you take OTC medications (eg tylenol, ibuprofen, fish oil, eye/ear drops, etc)? No         Prior to Admission medications    Medication Sig Last Dose Taking? Auth Provider   fish oil-omega-3 fatty acids 1000 MG capsule Take 2 g by mouth daily 11/7/2019 Yes Unknown, Entered By History   melatonin 3 MG CAPS Take 6 mg by mouth At Bedtime 11/7/2019 Yes Unknown, Entered By History   ondansetron (ZOFRAN-ODT) 4 MG ODT tab Take 1 tablet (4 mg) by mouth every 6 hours as needed for nausea or vomiting  Yes Dudley Harper MD   traZODone (DESYREL) 50 MG tablet Take 75 mg by mouth At Bedtime 11/7/2019 Yes Unknown, Entered By History   Cyanocobalamin (B-12) 1000 MCG TBCR Take 1,000 mcg by mouth daily 11/7/2019  Lavern Soriano MD   folic acid (FOLVITE) 1 MG tablet Take 1 tablet (1 mg) by mouth daily 11/7/2019  Brooklyn Vega MD   lactulose (CHRONULAC) 10 GM/15ML solution Take 30 mLs (20 g) by mouth 2 times daily 11/7/2019  Deng Vásquez MD   multivitamin, therapeutic (THERA-VIT) TABS tablet Take 1 tablet by mouth daily 11/7/2019  Dudley Harper MD   omeprazole (PRILOSEC) 20 MG DR capsule Take 1 capsule (20 mg) by mouth daily 11/7/2019   Concepcion Mcintyre,    spironolactone (ALDACTONE) 25 MG tablet Take 1 tablet (25 mg) by mouth 2 times daily 11/7/2019  Dudley Harper MD   vilazodone (VIIBRYD) 40 MG TABS tablet Take 1 tablet (40 mg) by mouth daily 11/7/2019  Concepcion Mcintyre DO   vitamin D3 (CHOLECALCIFEROL) 2000 units (50 mcg) tablet Take 1 tablet (2,000 Units) by mouth daily 11/7/2019  Brooklyn Vega MD
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
## 2019-11-11 NOTE — PROVIDER NOTIFICATION
Informed Nephrology about pt low BP after bolus. MD stated to monitor, he suspects they may be her baseline. Will monitor and update is worsens.

## 2019-11-11 NOTE — CONSULTS
11/11/19 chem dep consult.  Met with patient and her sister, Jade, was present whom she gave verbal consent to be present..  Patient was seen by [address], for outpatient evaluation on 10/29/19 with recommendation for outpatient treatment.  She is still interested in this option and plans to pursue following the hospital stay.  Her sister agrees with this plan and also does not feel she needs inpatient.  Patient reported she has the phone numbers at home for the counselor, I offered to write them down for her but she declined.  Will close consult at this time.
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
## 2019-11-12 NOTE — PROGRESS NOTES
Nephrology Progress Note          Assessment and Plan:   Acute on chronic renal failure continues to improve.  UO not recorded, but likely in non-oliguric range.  Course is consistent with improving FIGUEROA, perhaps due to ATN.  Expect renal function will again return to baseline, which is Cr ~0.7.  Agree with no more IV fluid.  Only dietary restriction is low salt.  Close monitoring of chemistries and fluid balance.  Maybe resume spironolactone soon.                Hepatic encephalopathy (H)    * No resolved hospital problems. *               Interval History:   no new complaints and doing well; no cp, sob, n/v/d, or abd pain.  Continues alert and confused.  VS ok.  BP staying in normal range.  Intake ok.  Voiding frequently; amount unknown.  Wt down ~2 kg.  Meds and labs reviewed.  Hgb down to 6.5; receiving RBC's now.  CBC o/w stable.  Lytes nl.  Cr down to ~2.4, and BUN sl lower.  Other chemistries ok.  UC shows GNR's; await sensitivities.                      Medications:       cefTRIAXone  1 g Intravenous Q24H     cyanocobalamin  1,000 mcg Oral Daily     diclofenac  2 g Transdermal TID     folic acid  1 mg Oral Daily     gabapentin  100 mg Oral BID     [START ON 11/13/2019] lactulose  20 g Oral Daily     lidocaine  1 patch Transdermal Q24h    And     [START ON 11/13/2019] lidocaine   Transdermal Q24H    And     lidocaine   Transdermal Q8H     multivitamin, therapeutic  1 tablet Oral Daily     omeprazole  20 mg Oral Daily     sodium chloride (PF)  3 mL Intracatheter Q8H     traZODone  50 mg Oral At Bedtime     vilazodone  40 mg Oral Daily     vitamin B1  100 mg Oral Daily     vitamin D3  2,000 Units Oral Daily                      Physical Exam:       Vital Sign Ranges  Temp:  [96.3  F (35.7  C)-99  F (37.2  C)] 98.7  F (37.1  C)  Pulse:  [] 102  Resp:  [18] 18  BP: ()/(43-55) 113/55  SpO2:  [93 %-100 %] 100 %    Weight, current:  71.5 kg (actual weight)  Weight change: -1.647 kg (-3 lb 10.1 oz)    I/O  last 3 completed shifts:  In: 2149 [P.O.:895; I.V.:1254]  Out: 400 [Urine:200; Stool:200]    Physical Exam:   General:  Patient comfortable, in no apparent distress.  Awake, alert, oriented x3.  Neck:  Supple, no JVD.  Lungs:  Clear to auscultation bilaterally.  Cardiac:  Regular rate and rhythm, no murmurs, rub, or gallops.  Abdomen:  Soft, nontender, more distended with ascites,physiologic sounds.  Extremities:  Same marked leg edema.  2+ pulses.  Skin:  Warm, dry.  Neurologic:  No focal deficits.             Data:        Lab Results   Component Value Date     11/12/2019    Lab Results   Component Value Date    CHLORIDE 112 (H) 11/12/2019    Lab Results   Component Value Date    BUN 32 (H) 11/12/2019      Lab Results   Component Value Date    POTASSIUM 3.7 11/12/2019    Lab Results   Component Value Date    CO2 20 11/12/2019    Lab Results   Component Value Date    CR 2.39 (H) 11/12/2019        Lab Results   Component Value Date     11/12/2019     11/11/2019     11/10/2019     Lab Results   Component Value Date    POTASSIUM 3.7 11/12/2019    POTASSIUM 3.3 (L) 11/11/2019    POTASSIUM 3.4 11/11/2019     Lab Results   Component Value Date    CHLORIDE 112 (H) 11/12/2019    CHLORIDE 109 11/11/2019    CHLORIDE 106 11/10/2019     Lab Results   Component Value Date    CO2 20 11/12/2019    CO2 20 11/11/2019    CO2 20 11/10/2019     Lab Results   Component Value Date    CR 2.39 (H) 11/12/2019    CR 2.98 (H) 11/11/2019    CR 3.17 (H) 11/10/2019     Lab Results   Component Value Date    BUN 32 (H) 11/12/2019    BUN 36 (H) 11/11/2019    BUN 37 (H) 11/10/2019     Lab Results   Component Value Date    HGB 6.5 (LL) 11/12/2019    HGB 7.5 (L) 11/11/2019    HGB 8.3 (L) 11/10/2019     No results found for: PH, PHARTERIAL, PO2, [id number], SAT, PCO2, HCO3, BASEEXCESS, ALETHEA, BEB          Edgard Mcghee MD  Nephrology; [facility]  [phone]
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
## 2019-11-12 NOTE — PLAN OF CARE
Pt remains hospitalized for hepatic encephalopathy and UTI  A&O, slow to respond at times, difficulty with word finding  PIV saline locked, continues IV Rocephin   Up with assist x 1 with gait belt, unsteady   On tele monitoring   Loose stools x 2 this shift, incont x 1  Soft BP's, tachy, on RA  Tylenol for back pain and icy hot patch in place
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
## 2019-11-13 NOTE — PLAN OF CARE
Discharge Planner OT   Patient plan for discharge: to sister's apartment   Current status: Pt participated in cognitive testing (MOCA), required modification due to low vision, pt able to complete assessment. Scored 13/30 (score of 26 or higher represents normal cognition). Pt demonstrated deficits in the areas of visuospatial/executive function, naming, memory/delayed recall, attention, language, and orientation. Pt had frequent difficulty recalling instructions immediately after they were provided, and required increased time for most items (~35 minutes, typically takes 10-15). Based on performance pt has significant difficulty in planning, working/short term memory, and word finding. Pt's delayed recall was improved with multiple choice cues, which suggests that pt has difficulty with memory retrieval, rather than encoding. Pt was left in bed with all needs in reach, nursing informed of pt status.   Barriers to return to prior living situation: falls risk, cognitive status, below baseline for ADLs/IADLs  Recommendations for discharge: TCU, OT CPT assessment    Rationale for recommendations: pt below baseline for ADLs/IADLs. Per discussion with  pt will be starting IV med and that if discharged with family pt would be alone at times during day/week and would be responsible for IV management. Based on MOCA score/cognitive status pt would be unsafe to manage own IV in home environment. As pt is below baseline for ADLs/IADLs, pt would benefit from skilled services in TCU setting to manage IV, increase pt safety/independence with ADLs/IADLs, and to complete CPT for living/situation/support recommendations.        Entered by: Addison Renae 11/13/2019 9:34 AM
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
## 2019-11-13 NOTE — PLAN OF CARE
Patient alert and oriented x4 but very restless overnight at times. Speech more clear and fluent last night versus night prior. BLE edema, encouraged patient to elevate her feet. Pain in lower back, lidocane patch applied and voltaren cream applied to entire back. Patient states that her discharge plan is to live with her sister for a while.  Started contact precautions for ESBL, IV antibiotics changed to ertapenem. No events overnight related to significant other.

## 2019-11-13 NOTE — PLAN OF CARE
A&O, forgetful. Ambulating Ax1, GB and walker. 2g Na diet, tolerating well. Distended abdomen, multiple loose stools today, lactulose decreased. 1u PRBC received. Rocephin continues. Pain managed with Voltaren gel. Dressing to right lower leg blister intact. Sister at bedside most of the day. GI, WOC, CC, SW, Neph, Pysch, and chem dep consulted.

## 2019-11-13 NOTE — PROGRESS NOTES
Discharge Planner   Discharge Plans in progress: YES  Barriers to discharge plan: awaiting IV abx plan and if pt would have a therapy need for TCU.    Follow up plan: Pt has been to TCU in the past.  She went to [facility] and had positive things to say about her therapy . She request a private room like last time.  She said she didn't have to pay anything last time, but would be open to paying privately if needed. Will await PT and OT recommendations as Pt has [employer] and would need a preauth.    Lorie Jha RN BSN   Inpatient Care Coordination  [facility]  [phone]           Entered by: Marilin Jha 11/13/2019 1:48 PM       ADDENDUM: referral sent to [facility] for IV ABX and OT therapy.

## 2019-11-13 NOTE — PLAN OF CARE
PT-attempted session. She declined as wanting to do some paper work. Per chart review is walking with no device to 300 feet possible need for SEC use and able to do stairs. Per established PT POC is home. Please see OT notes for other barriers to return to prior living.

## 2019-11-13 NOTE — PROGRESS NOTES
"SPIRITUAL HEALTH SERVICES Progress Note  [facility]    [facility] f/u visit to Radah.  [facility] author greeted Radha sitting up in bed finishing breakfast.  Radha waved author into the room for continued reflective sharing.    Radha expressed gratitude and trust for all those caring for her healing.  Furthermore, she expressed gratitude for all those involved with helping her with decision making regarding changes in her home life post discharge.  \"You are all such a great caring team who work well together.\"   She acknowledged that she has a lot to process and that it is overwhelming.      Radha stated that she heavily relies on Brant to get her thru everything.  Prayers provided to Radha as which point she began to weep.  Reflective sharing ensued.    Radha was encouraged to contact [facility] via nurse for f/u visit(s)  if desired.    Diya Dudley Intern  Phone:  [phone]    "

## 2019-11-14 NOTE — CONSULTS
ID consult dictated IMP 1 60 yo female ESBl UC +, not clear symptomatic or role in overall situation    REC erta while here, rel short course if here several days for other reasons maybe enough, if home short course po septra

## 2019-11-14 NOTE — PLAN OF CARE
"OT session attempted - declines OOB activity at this time stating \"I've got a lot of things to figure out right now, can you come back?\"     2nd attempt: agreeable in late AM.    Discharge Planner OT   Patient plan for discharge: TCU  Current status: Pt completed bed mobility supine <> sit EOB with SBA. Pt completed ambulation to/from BR, requesting to attempt without assistive device, mild unsteadiness and path deviation noted when ambulation without assistive device. Pt completed grooming/hygiene tasks of oral cares in stance at sink with SBA. Pt ambulated in hallway FWW level with CGA/min A, impaired safety awareness with use of FWW, LOB when turning requiring min A to correct. Pt able to don/doff B socks with SBA while seated. Pt noted to be making a list upon OT arrival, noted repeated items, words scattered across paper, pt reporting difficulty organizing thoughts, however declined assist for organizing list.   Barriers to return to prior living situation: falls risk, cognitive status, below baseline for ADLs/IADLs  Recommendations for discharge: TCU, OP CPT assessment  Rationale for recommendations: Pt remains below baseline for ADLs/IADLs.  Recommend ongoing skilled OT while IP and in TCU setting to increase safety and indep in all ADLS, IADLs, and mobility tasks. Pt currently requires assist with IV med mgmt, based on cognition, unlikely pt could safely manage independently.        Entered by: Omayra Arroyo 11/14/2019 11:47 AM           "

## 2019-11-14 NOTE — PLAN OF CARE
Pt is stable today, calling appropriately for assist.  Using bed alarm for safety.  Up with SBA, gait belt.  Had one BM, large and soft/formed appearing.  Pt is on lactulose once per day. Pt is forgetful, but is able to state orientation correctly.  Bruising throughout body.  BLE edema +2-3.  Foam to RLE wound.

## 2019-11-14 NOTE — PLAN OF CARE
Patient still confused overnight but improved from last. VSS. IV in right arm patient. Assist of 1 with a walker. Skin is fragile with multiple bruises. Patient has chronic low back pain, lidocaine patch applied to lower back. Plan is to discharge to TCU.
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
## 2019-11-14 NOTE — PLAN OF CARE
A&O, forgetful. Ambulating Ax1 gait belt and walker. Tolerating low sodium diet. Tele reading SR. ANTHONY invanz continues. Bilateral lower extremity edema. Pain managed with tylenol, bengay, and voltaren. Contact precautions remain in place. ID consulted. PT, OT, CC following.
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
## 2019-11-14 NOTE — PROGRESS NOTES
CLINICAL NUTRITION SERVICES - REASSESSMENT NOTE      Malnutrition: (11/14/2019)  % Weight Loss: Unable to determine PTA d/t fluid masking, relatively stable with slight fluid shifts during admit  % Intake: Decreased intake does not meet criteria --> during admit, hard to determine PTA though suspect meeting <75% needs at times  Subcutaneous Fat Loss:  Orbital region moderate depletion, Upper arm region depletion   Muscle Loss:  Temporal region moderate depletion, Clavicle bone region moderate/severe depletion, Acromion bone region moderate depletion, Scapular bone region moderate depletion and Dorsal hand region moderate depletion - fluid on lower extremities, ascites on abdomen area  Fluid Retention: At least mild to moderate     Malnutrition Diagnosis: Severe malnutrition  In Context of:  Acute illness or injury  Chronic illness or disease  Environmental or social circumstances       EVALUATION OF PROGRESS TOWARD GOALS   Diet: 2 gram Na + Boost shakes between meals BID    Intake/Tolerance:  % meal consumption since last RD assessment.  Consistently ordering meals BID-TID.  Patient herself reports early satiety as main barrier to eating.  She is drinking at least 1 supplement daily. She likes the flavor and would like to continue receiving.  Suspect continues to meet <75% needs since admission given above, though overall slowly improving with use of supplements and encouragement.  See protein/small, frequent meals education below.        ASSESSED NUTRITION NEEDS PER APPROVED PRACTICE GUIDELINES:     Dosing Weight 70.4 kg  (lowest during admission)  Estimated Energy Needs: 4597-7659+ kcals (25-30+ Kcal/Kg)  Justification: maintenance  Estimated Protein Needs:  grams protein (1.2-1.5 g pro/Kg)  Justification: preservation of lean body mass, cirrhosis   Estimated Fluid Needs: per MD      NEW FINDINGS:   - GI following --> outpatient EGD recommended.  Abdominal ultrasound showing moderate ascites --> no  repeat para given renal function.  Meld = 18.  - Nephrology continues to follow in the background.  - WOCN following --> small ulceration to RLE, no documented PI.    - Complex social situation, refer to LSW notes.  Plans for TCU at discharge.  - Medications reviewed including:   Lactulose   Daily MVI/M  - Labs reviewed.  - Slight wt shifts in the setting of fluids since admit:  Vitals:    11/11/19 0729 11/11/19 2023 11/12/19 0927 11/13/19 0650   Weight: 70.4 kg (155 lb 1.6 oz) 73.7 kg (162 lb 8 oz) 71.5 kg (157 lb 11.2 oz) 72.2 kg (159 lb 3.2 oz)    11/14/19 0657   Weight: 73.4 kg (161 lb 12.8 oz)   - Stooling trends reviewed.    Previous Goals:   Pt to consume % of meals ordered TID + 1-2 oral nutritional supplements per day   Evaluation: Not met    Previous Nutrition Diagnosis:   Increased nutrient needs (protein) related to overall medical status (cirrhosis and wound healing) as evidenced by protein needs at 1.2-1.5 g/kg.   Evaluation: No change, continues below      CURRENT NUTRITION DIAGNOSIS  Increased nutrient needs (protein) related to repletion needs in the setting of cirrhosis with LBM losses as evidenced by protein needs of at least 1.2 g/kg/day.    INTERVENTIONS  Recommendations / Nutrition Prescription  Continue 2 gram Na diet order.  Protein focus as able.    Continue supplements as ordered.    Continue daily MVI/M.    Implementation  Collaboration and Referral of Nutrition care: Discussed POC with team during rounds.    Nutrition Education: Discussed protein-containing foods, encouraged incorporation at each meal.  Patient reports she does not like meats and is not fond of eggs consistently.  Reviewed other sources including beans, yogurt, milk, small amounts of cheeses (mindful of Na consumption), peanut butter, etc.  Given early satiety, encouraged patient to consume protein foods from meals first.    Goals  Patient to consistently consume at least 50-75% of meals TID + 1-2 supplements  daily.      MONITORING AND EVALUATION:  Progress towards goals will be monitored and evaluated per protocol and Practice Guidelines      Vicki Mcdonough RD, LD  Clinical Dietitian  3rd floor/ICU: [phone]  All other floors: [phone]  Weekend/holiday: [phone]
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
## 2019-11-14 NOTE — PROGRESS NOTES
"Patient referred by Pain and Palliative for Healing Touch due to pain and anxiety.  Healing Touch explained to patient and patient consented to HT session.  Pain prior to session 6/10, anxiety prior to session 9/10. Performed HT with music.  Pain post session 3/10, denies anxiety post session. \"I feel so relaxed!\" Time spent with patient 60 minutes.    Raf LEZAMA RN-BC HNB-BC HTP  "

## 2019-11-15 NOTE — PROGRESS NOTES
"Discharge Planner   Discharge Plans in progress: Called Gerardo at [facility] to update that pt will d.c home. Provided contact for Arturo/Melissa [phone] to call for CD follow up. Spoke with Pt's sister who directed me to her sibling.   Barriers to discharge plan: PT is No longer needing IV antibiotics at this time and does not have PT need for TCU.. OT would recommend support and ongoing support but not enough for pt's commercial insurance plan   Follow up plan: Family would like to have new meds filled here and will look for clothing for pt    Pt is upset that she is not able to get to TCU at this time as she enjoyed her last stay.  BHAVIN attempted to explain process to pt who stated \" I don't care\"  SW was surprised that pt now stated she does not want to stay with family as this was the original plan.. Pt stated she is not sure   Plan will be for pt to be picked up by family at 1300    Corinne White \[facility] [place] [facility]\""  Inpatient Care Coordination   [phone]  [facility]          Entered by: Corinne C. White 11/15/2019 10:19 AM       "

## 2019-11-15 NOTE — PLAN OF CARE
OT session attempted - pt declining participation, had just worked with PT. Reporting possible discharge today.       Occupational Therapy Discharge Summary    Reason for therapy discharge:    Discharged to home with home therapy.    Progress towards therapy goal(s). See goals on Care Plan in [facility] electronic health record for goal details.  Goals partially met.  Barriers to achieving goals:   discharge from facility.    Therapy recommendation(s):    Continued therapy is recommended.  Rationale/Recommendations:  TCU was recommended, however pt did qualify. Pt discharging home w/ HH OT - recommend more in-depth cognitive assessment. .
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
## 2019-11-15 NOTE — PLAN OF CARE
Pt oriented but forgetful. Loose stools tonight, pt on Lactulose. Good appetite for dinner. Sinus Tach on tele, heart rate 103. She reports small amount of burning with urination. Plus three LE edema and abdominal ascites. Pain in low back, given Voltaren gel and Lidocaine patch for comfort.

## 2019-11-15 NOTE — DISCHARGE SUMMARY
[facility]    Discharge Summary  Hospitalist    Date of Admission:  11/10/2019  Date of Discharge:  11/15/2019  Discharging Provider: Van Fernandez MD  Date of Service (when I saw the patient): 11/15/19    Discharge Diagnoses   Urinary tract infection ESBL E. coli  Hepatic encephalopathy  Cirrhosis  Acute kidney injury with acute tubular necrosis  Hypokalemia  Alcohol dependence  Subacute anemia  Fixed coagulopathy with thrombocytopenia due to cirrhosis  Right lower extremity wound  Anasarca due to cirrhosis and chronic liver disease.  Severe malnutrition due to infection and cirrhosis    History of Present Illness  &Hospital Course   Radha Turcios is an 59 year old female with a past medical history significant for alcoholic cirrhosis, anasarca, hepatic encephalopathy, MDD, anxiety and panic disorder, anemia, thrombocytopenia, tobacco dependence and GERD who presents with acute encephalopathy with ammonia level of 69.  Patient also found to be in acute kidney injury with creatinine of 3.2 with previously normal creatinine 1 month prior.  Patient was treated with her ongoing lactulose and her lactulose was titrated down to 20 g daily with 2-3 bowel movements per day.  Patient is symptomatically improved but still somewhat and intermittently confused.  Occupational Therapy recommending  CPT.  Patient was seen in consultation by gastroenterology as well and due to the acute kidney injury her prolactin was held she was given IV fluids and albumin.  Her creatinine is now much improved at time of discharge and is 1.24.  Spironolactone was restarted at her prior to admission dose of 25 mg twice a day.  She was also noted to have an E. coli urinary tract infection which ended up being ESBL.  She was seen by infectious disease and ertapenem was initiated.  Patient will transition to oral Bactrim at time of discharge for an additional 7 days.  Patient was seen by the pain team as well and placed on some  diclofenac gel, lidocaine patch and gabapentin.  Recommend weaning off the diclofenac gel as able.  Patient was also seen by chemical dependency counselor and psychiatry.  Continuing her oral vitamins including thiamine, folic acid and multivitamin.  GI is recommending outpatient EGD due to her anemia which had a andrade of 6.5 and she received 1 unit of packed cells.  She will see [facility] as an outpatient.  She has a right lower extremity wound for which the nurse saw her as well and will continue outpatient treatment.  Also seen by nutrition and found to have some emaciation and muscle loss.  Recommend nutritional supplements.     Recommended repeat CBC and basic metabolic panel within 1 week and follow-up with her primary care physician.  Recommending daily weights if possible.  May consider low-dose Lasix at outpatient follow-up if renal function stabilizes, will need close monitoring.  Also recommended to see hepatologist with [facility].      Van Fernandez MD, MD    Significant Results and Procedures   See above and below      Pending Results   These results will be followed up by Concepcion Mcintyre   Unresulted Labs Ordered in the Past 30 Days of this Admission     No orders found from 10/11/2019 to 11/11/2019.          Code Status   Full Code       Primary Care Physician   Concepcion Mcintyre    Physical Exam   Temp: 98.3  F (36.8  C) Temp src: Oral BP: 109/66   Heart Rate: 107 Resp: 18 SpO2: 98 % O2 Device: None (Room air)    Vitals:    11/13/19 0650 11/14/19 0657 11/15/19 0313   Weight: 72.2 kg (159 lb 3.2 oz) 73.4 kg (161 lb 12.8 oz) 74.3 kg (163 lb 11.2 oz)     Vital Signs with Ranges  Temp:  [96  F (35.6  C)-98.3  F (36.8  C)] 98.3  F (36.8  C)  Heart Rate:  [100-107] 107  Resp:  [18-20] 18  BP: (107-115)/(59-66) 109/66  SpO2:  [96 %-99 %] 98 %  I/O last 3 completed shifts:  In: 1330 [P.O.:1330]  Out: 500 [Urine:300; Stool:200]    Constitutional: Awake, alert, cooperative, no  apparent distress.  Sitting in chair.    Eyes: Conjunctiva and pupils examined and normal.  HEENT: Moist mucous membrane  Respiratory: Clear to auscultation bilaterally, no crackles or wheezing.  Cardiovascular: Regular rate and rhythm,  GI: Soft, non-distended, non-tender, normal bowel sounds.  Skin: No rashes, no cyanosis, 2+ bilateral lower extremity edema, tender  Musculoskeletal: No joint swelling, erythema or tenderness.  Neurologic: Cranial nerves 2-12 intact, no tremor.  A little confused.   Psychiatric: anxious and upset about being in hospital.      Discharge Disposition   Discharged to home, with family   Condition at discharge: Stable    Consultations This Hospital Stay   SOCIAL WORK IP CONSULT  PHYSICAL THERAPY ADULT IP CONSULT  OCCUPATIONAL THERAPY ADULT IP CONSULT  NEPHROLOGY IP CONSULT  PAIN MANAGEMENT ADULT IP CONSULT  PSYCHIATRY IP CONSULT  CHEMICAL DEPENDENCY IP CONSULT  CARE COORDINATOR IP CONSULT  GASTROENTEROLOGY IP CONSULT  WOUND OSTOMY CONTINENCE NURSE  IP CONSULT  INFECTIOUS DISEASES IP CONSULT    Time Spent on this Encounter   I, Van Fernandez MD, personally saw the patient today and spent greater than 30 minutes discharging this patient.    Discharge Orders      PHYSICAL THERAPY REFERRAL      Occupational Therapy Referral      Reason for your hospital stay    Hepatic encephalopathy, urinary tract infection with ESBL, acute kidney injury with acute tubular necrosis, anemia, right lower extremity wound.     Follow-up and recommended labs and tests     Follow up with primary care provider, Concepcion Mcintyre, within 7 days to evaluate medication change, to evaluate treatment change and for hospital follow- up.  The following labs/tests are recommended: bmp and cbc.     Activity    Your activity upon discharge: activity as tolerated     Monitor and record    weight every day, call if weight gain 3 lbs or greater in one day or 5 lbs in one week     Wound care and dressings    Instructions to  care for your wound at home: as directed per wound nurse.     Full Code     Diet    Follow this diet upon discharge: Orders Placed This Encounter      Snacks/Supplements Adult: Boost Shake; Between Meals      2 Gram Sodium Diet     Discharge Medications   Current Discharge Medication List      START taking these medications    Details   diclofenac (VOLTAREN) 1 % topical gel Place 2 g onto the skin 3 times daily  Qty: 100 g, Refills: 0    Associated Diagnoses: Closed fracture of multiple ribs of left side with routine healing, subsequent encounter      gabapentin (NEURONTIN) 100 MG capsule Take 1 capsule (100 mg) by mouth 2 times daily  Qty: 60 capsule, Refills: 0    Associated Diagnoses: Closed fracture of multiple ribs of left side with routine healing, subsequent encounter      Lidocaine (LIDOCARE) 4 % Patch Place 1 patch onto the skin every 24 hours To prevent lidocaine toxicity, patient should be patch free for 12 hrs daily.  Qty: 12 patch, Refills: 0    Associated Diagnoses: Closed fracture of multiple ribs of left side with routine healing, subsequent encounter      menthol, Topical Analgesic, 2.5% (BENGAY VANISHIN SCENT) 2.5 % GEL topical gel Apply topically every 6 hours as needed for moderate pain    Associated Diagnoses: Closed fracture of multiple ribs of left side with routine healing, subsequent encounter      order for DME Equipment being ordered: Walker Wheels () and Walker ()  Treatment Diagnosis: hepatic encephalopathy, UTI and impaired gait/balance  Qty: 1 each, Refills: 0    Associated Diagnoses: Hepatic encephalopathy (H)      sulfamethoxazole-trimethoprim (BACTRIM DS/SEPTRA DS) 800-160 MG tablet Take 1 tablet by mouth 2 times daily for 7 days  Qty: 14 tablet, Refills: 0    Associated Diagnoses: Urinary tract infection without hematuria, site unspecified         CONTINUE these medications which have CHANGED    Details   lactulose (CHRONULAC) 10 GM/15ML solution Take 30 mLs (20 g) by  mouth daily  Qty: 500 mL, Refills: 3    Associated Diagnoses: Hepatic encephalopathy (H)      vitamin B1 (THIAMINE) 100 MG tablet Take 1 tablet (100 mg) by mouth daily  Qty: 7 tablet, Refills: 0    Associated Diagnoses: Alcoholic cirrhosis of liver with ascites (H)         CONTINUE these medications which have NOT CHANGED    Details   fish oil-omega-3 fatty acids 1000 MG capsule Take 2 g by mouth daily      melatonin 3 MG CAPS Take 6 mg by mouth At Bedtime      ondansetron (ZOFRAN-ODT) 4 MG ODT tab Take 1 tablet (4 mg) by mouth every 6 hours as needed for nausea or vomiting    Associated Diagnoses: Alcoholic cirrhosis of liver with ascites (H)      traZODone (DESYREL) 50 MG tablet Take 75 mg by mouth At Bedtime      Cyanocobalamin (B-12) 1000 MCG TBCR Take 1,000 mcg by mouth daily  Qty: 100 tablet, Refills: 1    Associated Diagnoses: Vitamin B12 deficiency (non anemic)      folic acid (FOLVITE) 1 MG tablet Take 1 tablet (1 mg) by mouth daily  Qty: 20 tablet, Refills: 0    Associated Diagnoses: Alcoholic cirrhosis of liver with ascites (H)      multivitamin, therapeutic (THERA-VIT) TABS tablet Take 1 tablet by mouth daily    Associated Diagnoses: Alcoholic cirrhosis of liver with ascites (H)      omeprazole (PRILOSEC) 20 MG DR capsule Take 1 capsule (20 mg) by mouth daily  Qty: 90 capsule, Refills: 0    Associated Diagnoses: Gastroesophageal reflux disease without esophagitis      spironolactone (ALDACTONE) 25 MG tablet Take 1 tablet (25 mg) by mouth 2 times daily  Refills: 0    Associated Diagnoses: Alcoholic cirrhosis of liver with ascites (H)      vilazodone (VIIBRYD) 40 MG TABS tablet Take 1 tablet (40 mg) by mouth daily  Qty: 90 tablet, Refills: 0    Associated Diagnoses: Episode of recurrent major depressive disorder, unspecified depression episode severity (H); Anxiety      vitamin D3 (CHOLECALCIFEROL) 2000 units (50 mcg) tablet Take 1 tablet (2,000 Units) by mouth daily  Qty: 30 tablet, Refills: 3     Associated Diagnoses: Alcoholic cirrhosis of liver with ascites (H)         STOP taking these medications       melatonin 3 MG tablet Comments:   Reason for Stopping:             Allergies   Allergies   Allergen Reactions     Amoxicillin      Rash     Data   Most Recent 3 CBC's:  Recent Labs   Lab Test 11/15/19  0715 11/14/19  0646 11/13/19  0748   WBC 8.2 6.6 5.9   HGB 8.4* 7.8* 8.2*   * 101* 102*   * 102* 109*      Most Recent 3 BMP's:  Recent Labs   Lab Test 11/15/19  0715 11/14/19  1926 11/14/19  0646 11/13/19  0748    137 139 141   POTASSIUM 3.6  --  3.4 3.6   CHLORIDE 110*  --  111* 113*   CO2 19*  --  21 21   BUN 24  --  25 27   CR 1.14* 1.24* 1.47* 1.69*   ANIONGAP 8  --  7 7   KARLEY 8.2*  --  8.3* 8.7   GLC 73  --  72 72     Most Recent 2 LFT's:  Recent Labs   Lab Test 11/14/19 0646 11/10/19  1952   AST 35 40   ALT 20 22   ALKPHOS 89 72   BILITOTAL 0.8 1.3     Most Recent INR's and Anticoagulation Dosing History:  Anticoagulation Dose History     Recent Dosing and Labs Latest Ref Rng & Units 10/10/2019 10/12/2019 10/14/2019 10/15/2019 10/16/2019 11/10/2019 11/13/2019    INR 0.86 - 1.14 1.88(H) 1.65(H) 1.61(H) 1.50(H) 1.48(H) 1.58(H) 1.71(H)        Most Recent 3 Troponin's:No lab results found.  Most Recent Cholesterol Panel:  Recent Labs   Lab Test 06/19/19  1044   CHOL 81   LDL 36   HDL 30*   TRIG 75     Most Recent 6 Bacteria Isolates From Any Culture (See EPIC Reports for Culture Details):  Recent Labs   Lab Test 11/11/19  0254 10/11/19  1310 10/06/19  2053 10/06/19  2017 10/06/19  1850 08/10/16  1336   CULT >100,000 colonies/mL  Escherichia coli ESBL  *  Called to  Radha Travis, RN @ 2106 11/12/19 TM.    ESBL (extended beta lactamase) producing organisms require contact precautions. No growth No growth No growth No growth >100,000 colonies/mL Escherichia coli*     Most Recent TSH, T4 and A1c Labs:  Recent Labs   Lab Test 10/06/19  1852 06/19/19  1044   TSH 2.20 4.58*   T4  --  1.26      Results for orders placed or performed during the hospital encounter of 11/10/19   XR Chest 2 Views    Narrative    EXAM: XR CHEST 2 VW  LOCATION: [facility]  DATE/TIME: 11/10/2019 8:27 PM    INDICATION: Injury with pain.  COMPARISON: None.      Impression    IMPRESSION: Healing left lower rib fracture with callus. Chest otherwise negative.   Cervical spine CT w/o contrast    Narrative    EXAM: CT CERVICAL SPINE W/O CONTRAST, CT HEAD W/O CONTRAST  LOCATION: [facility]  DATE/TIME: 11/10/2019 8:18 PM    INDICATION: Trauma  COMPARISON: Head CT 10/06/2019  TECHNIQUE:   HEAD CT: Without IV contrast. Multiplanar reformats. Dose reduction techniques were used.  CERVICAL SPINE CT: Routine without IV contrast. Multiplanar reformats. Dose reduction techniques were used.    FINDINGS:   HEAD CT:   INTRACRANIAL CONTENTS: No intracranial hemorrhage. Mild diffuse cerebral parenchymal volume loss. No midline shift. The basilar cisterns are patent. No CT evidence for an acute infarct.    VISUALIZED ORBITS/SINUSES/MASTOIDS: No intraorbital abnormality. No paranasal sinus mucosal disease. No middle ear or mastoid effusion.    BONES/SOFT TISSUES: No acute abnormality.    CERVICAL SPINE CT: There is some motion artifact centered at C5-C6.    VERTEBRA: Straightening of the normal cervical lordosis. 1 mm spondylolisthesis of C4 on C5. Craniocervical junction is within normal limits. Mild age indeterminate superior endplate height loss of T2. The rest of the vertebral body heights are   maintained. No prevertebral soft tissue edema. No cervical spine fractures. Healing posterior left first rib fracture.    CANAL/FORAMINA: Moderate intervertebral disc height loss at C5-C6 and C6-C7. No high-grade spinal canal narrowing. Mild bilateral neuroforaminal narrowing at C5-C6. Moderate left neuroforaminal narrowing at C6-C7.    PARASPINAL: No extraspinal abnormality. Visualized lung fields are clear.      Impression     IMPRESSION:  HEAD CT:  1.  No intracranial hemorrhage, mass lesions, hydrocephalus or CT evidence for an acute infarct.  2.  Mild age-related changes.    CERVICAL SPINE CT:  1.  Age indeterminate mild superior endplate height loss of T2. Please correlate with point tenderness over this level.  2.  No cervical spine fractures or posttraumatic subluxations seen within the limitations of this exam. Mild reversal of the normal cervical lordosis, nonspecific. It can be seen in muscle spasm.  3.  Healing posterior left first rib fracture.   CT Head w/o Contrast    Narrative    EXAM: CT CERVICAL SPINE W/O CONTRAST, CT HEAD W/O CONTRAST  LOCATION: [facility]  DATE/TIME: 11/10/2019 8:18 PM    INDICATION: Trauma  COMPARISON: Head CT 10/06/2019  TECHNIQUE:   HEAD CT: Without IV contrast. Multiplanar reformats. Dose reduction techniques were used.  CERVICAL SPINE CT: Routine without IV contrast. Multiplanar reformats. Dose reduction techniques were used.    FINDINGS:   HEAD CT:   INTRACRANIAL CONTENTS: No intracranial hemorrhage. Mild diffuse cerebral parenchymal volume loss. No midline shift. The basilar cisterns are patent. No CT evidence for an acute infarct.    VISUALIZED ORBITS/SINUSES/MASTOIDS: No intraorbital abnormality. No paranasal sinus mucosal disease. No middle ear or mastoid effusion.    BONES/SOFT TISSUES: No acute abnormality.    CERVICAL SPINE CT: There is some motion artifact centered at C5-C6.    VERTEBRA: Straightening of the normal cervical lordosis. 1 mm spondylolisthesis of C4 on C5. Craniocervical junction is within normal limits. Mild age indeterminate superior endplate height loss of T2. The rest of the vertebral body heights are   maintained. No prevertebral soft tissue edema. No cervical spine fractures. Healing posterior left first rib fracture.    CANAL/FORAMINA: Moderate intervertebral disc height loss at C5-C6 and C6-C7. No high-grade spinal canal narrowing. Mild bilateral  neuroforaminal narrowing at C5-C6. Moderate left neuroforaminal narrowing at C6-C7.    PARASPINAL: No extraspinal abnormality. Visualized lung fields are clear.      Impression    IMPRESSION:  HEAD CT:  1.  No intracranial hemorrhage, mass lesions, hydrocephalus or CT evidence for an acute infarct.  2.  Mild age-related changes.    CERVICAL SPINE CT:  1.  Age indeterminate mild superior endplate height loss of T2. Please correlate with point tenderness over this level.  2.  No cervical spine fractures or posttraumatic subluxations seen within the limitations of this exam. Mild reversal of the normal cervical lordosis, nonspecific. It can be seen in muscle spasm.  3.  Healing posterior left first rib fracture.   XR Pelvis 1/2 Views    Narrative    EXAM: XR PELVIS 1/2 VW  LOCATION: [facility]  DATE/TIME: 11/10/2019 8:28 PM    INDICATION: Injury with pain.  COMPARISON: None.      Impression    IMPRESSION: Negative pelvis. No fracture or dislocation.   US Abdomen Limited    Narrative    US ABDOMEN LIMITED   11/12/2019 8:27 PM     HISTORY: Ascites.    COMPARISON: None.    FINDINGS: Ultrasound scanning through all four quadrants of the  abdomen demonstrates a moderate amount of ascites, most prominent in  the right upper quadrant and left lower quadrant.      Impression    IMPRESSION: Moderate amount of ascites.     SANGITA AVILES MD

## 2019-11-15 NOTE — PLAN OF CARE
Discharge Planner PT   Patient plan for discharge: Home  Current status: Supine upon arrival, agreeable to PT after encouragement. Transferred supine<>sit with SBA. Transferred sit<>stand with SBA, mild LOB observed upon standing, corrected with extra step. Pt appears and reports being more steady with use of FWW. Pt ambulates 320' with FWW, SBA. Pt demonstrates mild unsteadiness with walker but no LOB observed throughout. Ambulates at decreased gait speed. Reporting off and on dizziness throughout ambulating but able to tolerate. Pt declines doing stairs at this time, per chart review, able to ascend/descend safely with use of railing and HHA.Transfers stand<>sit<>supine with SBA, VC for safety. Supine end of session with all needs in reach.  Barriers to return to prior living situation: none anticipated  Recommendations for discharge: Home with Home PT, FWW, and HHA with stairs  Rationale for recommendations: Pt presents below baseline for functional mobility at this time, requiring use of FWW for ambulation and transfers. Pt would benefit from continued home PT in order to increase balance, strength, endurance, and safety in order to return to PLOF. Pt requiring home PT at this time as leaving the house would be a taxing effort for patient and require assist from another person.       Entered by: Melinda Leonard 11/15/2019 11:56 AM        Physical Therapy Discharge Summary    Reason for therapy discharge:    Discharged to home with home therapy.    Progress towards therapy goal(s). See goals on Care Plan in [facility] electronic health record for goal details.  Goals partially met.  Barriers to achieving goals:   discharge from facility and Pt requiring assist with stairs.    Therapy recommendation(s):    Continued therapy is recommended.  Rationale/Recommendations:  Home PT.

## 2019-11-15 NOTE — PLAN OF CARE
Pt remains hospitalized for UTI/hepatic encephalopathy   A&O, forgetful at times, on RA  C/O lower back pain, declined pain medications   PIV saline locked, continues IV Invanz   BS hypo, abdomen distended, firm   Up with assist x 1 with gait belt, voided x 1, BM   On tele monitoring   Mepilex in place to RLE and left heel  +3 LE edema

## 2019-11-15 NOTE — PLAN OF CARE
AVS discussed with patient and spouse. Pt states ok to go home with spouse. SW updated spouse with resources after discharge. Medication education given to patient and spouse both verbalize understanding. Medications ordered sent with patient. Patient discharging via private vehicle with spouse to home. All belongings sent with patient.
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
## 2019-11-18 NOTE — TELEPHONE ENCOUNTER
Hospital F/U 11/15/2019 DX:Dash (Acute Kidney Injury) (H), Hepatic Encephalopathy (H) ED/IP 1/2    [phone] (home)

## 2019-11-18 NOTE — TELEPHONE ENCOUNTER
Reason for Call:  Home Health Care    Samara with [facility] called regarding (reason for call):     Orders are needed for this patient.    Skilled Nursing: HC nurse called and wanted to know if Dr. Mcintyre will follow this Pt for homecare     Pt Provider: Dr. Mcintyre     Phone Number Homecare Nurse can be reached at: [phone]    Can we leave a detailed message on this number? YES    Call taken on 11/18/2019 at 8:41 AM by Isabel Randolph

## 2019-11-18 NOTE — TELEPHONE ENCOUNTER
"  ED for acute condition Discharge Protocol    \"Hi, my name is Lizy Baird RN, a registered nurse, and I am calling from [facility].  I am calling to follow up and see how things are going for you after your recent emergency visit.\"    Pt says she is scheduled with PN on Wed 11/20 and does not need anything in the meantime.,  She says she has a home health nurse coming over this am.      Encouraged calling if she needs anything before apt.    Lizy Baird RN       [facility]   Discharge Diagnosis  Urinary tract infection ESBL E. coli  Hepatic encephalopathy  Cirrhosis  Acute kidney injury with acute tubular necrosis  Hypokalemia  Alcohol dependence  Subacute anemia  Fixed coagulopathy with thrombocytopenia due to cirrhosis  Right lower extremity wound  Anasarca due to cirrhosis and chronic liver disease.  Severe malnutrition due to infection and cirrhosis           Follow-up and recommended labs and tests      Follow up with primary care provider, Concepcion Mcintyre, within 7 days to evaluate medication change, to evaluate treatment change and for hospital follow- up.  The following labs/tests are recommended: bmp and cbc.        "

## 2019-11-18 NOTE — TELEPHONE ENCOUNTER
PN,   FYI:  Told homecare you will follow pt for orders  You have future hospital f/u appt with her    Next 5 appointments (look out 90 days)    Nov 20, 2019 12:15 PM CST  Office Visit with Concepcion Mcintyre DO  [facility] ([facility]) [address] [id number] [phone]        Thanks,  Natalie LOERA RN

## 2019-11-19 NOTE — TELEPHONE ENCOUNTER
Received form(s) from admission orders for [facility].  Placed form(s) in/on PN's box.  Forms need to be signed and faxed to number listed on form..    Call pt to verify form was sent: No  Copy needs to be sent for scanning after completion: Yes

## 2019-11-20 NOTE — TELEPHONE ENCOUNTER
Form Faxed and sent to Scan  Anupama Kelley  [facility] Unit Coordinator

## 2019-11-20 NOTE — PROGRESS NOTES
Subjective     Radha Turcios is a 59 year old female who presents to clinic today for the following health issues:    HPI       Hospital Follow-up Visit:    Hospital/Nursing Home/IP Rehab Facility: [facility]  Date of Admission: 11/10/19  Date of Discharge: 11/15/19  Reason(s) for Admission: alcoholic cirrhosis            Problems taking medications regularly:  None       Medication changes since discharge: None       Problems adhering to non-medication therapy:  None    Summary of hospitalization:  [facility] discharge summary reviewed  Diagnostic Tests/Treatments reviewed.  Follow up needed: labs and referral to [facility]  Other Healthcare Providers Involved in Patient s Care:         Homecare  Update since discharge: fluctuating course.     Post Discharge Medication Reconciliation: discharge medications reconciled and changed, per note/orders (see AVS).  Plan of care communicated with patient and her      Coding guidelines for this visit:  Type of Medical   Decision Making Face-to-Face Visit       within 7 Days of discharge Face-to-Face Visit        within 14 days of discharge   Moderate Complexity [id number]   High Complexity [id number]          First hospital stay - doesn't remember it  Second hospital visit - was scared    Pt is here today with her  to follow-up from her recent hospitalization -   She was hospitalized again after recent discharge.  Her Epic notes and labs were reviewed.  She initially went to the hospital after     Hepatic encephalopathy with ascites  - she had high ammonia levels and was started on lactulose -  Had some loose stools so stopped yesterday   Was on 30mL daily.  This was the dose titrated in the hospital but she stopped it   Has appointment December 11th with [facility] - [place]    Acute renal failure.  Was first creatinine was >3 and at time of discharge she was 1.24   Discharge on Friday 15th -   Taking spironalactone - was started first  hospital stay and restarted after second discharge  Of note they commented about possibly starting lasix  Since discharge she has had 7 pounds of weight gain in 5 days.    She has significant LE edema and ascites    Anemia and bloody nose  CBC - low hemoglobin and platelets   Was given one unit of blood when her hemoglobin was 6 but at discharge was 8    Urinary tract infection - ESBL  Some burning with urination -   Some dyuria and hesitency   Has 2 more days of the bactrim -     Alcohol - last drink was September 14th -     -------------------------------------    Patient Active Problem List   Diagnosis     Mixed hyperlipidemia     GERD (gastroesophageal reflux disease)     Anxiety     Insomnia     Controlled substance agreement signed       Panic disorder with agoraphobia, severe agoraphobic avoidance and mild panic attacks     Smoker     Episode of recurrent major depressive disorder, unspecified depression episode severity (H)     Abnormal TSH     Hepatic encephalopathy (H)     Alcoholic cirrhosis of liver with ascites (H)     ARF (acute renal failure) (H)     Distal left Clavicle Fracture     Lactic acidosis     Hypokalemia     Anemia     Coagulopathy (H)     Hypomagnesemia     Thrombocytopenia (H)     Acute respiratory failure with hypoxia (H)     GI bleed     Past Surgical History:   Procedure Laterality Date     NO HISTORY OF SURGERY         Social History     Tobacco Use     Smoking status: Current Every Day Smoker     Packs/day: 1.00     Years: 23.00     Pack years: 23.00     Types: Cigarettes     Smokeless tobacco: Never Used   Substance Use Topics     Alcohol use: Yes     Alcohol/week: 0.0 standard drinks     Comment: 4-5 drinks per week     Family History   Problem Relation Age of Onset     Lipids Mother      Heart Disease Mother      Alzheimer Disease Mother      Allergies Mother      C.A.D. Mother         had MI     Cancer Father 58        pancreatic or prostate?     Heart Disease Brother      C.A.D.  "Brother         2 brothers  of heart attacks/ one also had lung caner     Cancer Brother         2 brothers   of lung cancer     Cancer Sister         lung     Alcohol/Drug Sister          alcoholic cirrhosis     Depression Sister         anxiety disorder     Depression Brother         anxiety disorder     Colon Cancer Sister 51     Breast Cancer Sister            Reviewed and updated as needed this visit by Provider  Tobacco  Allergies  Meds  Problems  Med Hx  Surg Hx  Fam Hx         Review of Systems   ROS COMP: Constitutional, HEENT, cardiovascular, pulmonary, GI, , musculoskeletal, neuro, skin, endocrine and psych systems are negative, except as otherwise noted.      Objective    /64   Pulse 107   Temp 98.2  F (36.8  C) (Oral)   Resp 14   Ht 1.626 m (5' 4\")   Wt 77.2 kg (170 lb 3.2 oz)   LMP 2001   SpO2 100%   BMI 29.21 kg/m    Body mass index is 29.21 kg/m .  Physical Exam   GENERAL: alert, no distress and diffuse abd distention with b/l LE edema   Pt not able to fit in shoes - wearing socks today  RESP: decreased breath sounds throughout  CV: regular rates and rhythm, normal S1 S2, no S3 or S4, grade 2/6 systolic murmur heard best over the LSB and 3+ bilateral lower extremity pitting edema to upper thighs    Derm - right anterior shin with 2cm round open lesion - no signs of erythema  Pitting edema so severe she has water droplets seeping through her skin    Diagnostic Test Results:  Labs reviewed in Epic  Results for orders placed or performed in visit on 19 (from the past 24 hour(s))   CBC with platelets   Result Value Ref Range    WBC 7.4 4.0 - 11.0 10e9/L    RBC Count 2.52 (L) 3.8 - 5.2 10e12/L    Hemoglobin 8.2 (L) 11.7 - 15.7 g/dL    Hematocrit 24.3 (L) 35.0 - 47.0 %    MCV 96 78 - 100 fl    MCH 32.5 26.5 - 33.0 pg    MCHC 33.7 31.5 - 36.5 g/dL    RDW 17.5 (H) 10.0 - 15.0 %    Platelet Count 71 (L) 150 - 450 10e9/L     Additional labs pending    " "    Assessment & Plan     1. Alcoholic cirrhosis of liver with ascites (H)  Pt with complicated recent history - two hospitalizations but underlying issues was alcoholic cirrhosis - pt has quit drinking 2 months ago.  She has appt to see [facility] on December 11th  She is on lactulose but stopped the med yesterday -   Discussed importance to keep it going so that she has 2-3 BM per day to help keep ammonia level down and risk of encephalopathy down.  She will restart.  On B12 and folate  - folic acid (FOLVITE) 1 MG tablet; Take 1 tablet (1 mg) by mouth daily  Dispense: 20 tablet; Refill: 0    2. Other megaloblastic anemia  Hemoglobin stable -   Platelets are down from 100 to 70's today -   Will see back in one week for recheck   - CBC with platelets  - Magnesium    3. Acute renal failure, unspecified acute renal failure type (H)  Rechecking labs but most recent creatinine was normal  Discussed her signficant edema and weight gain - concern about failure vs the anasarca   Will treat with low dose lasix - 20mg BID for 7 days.  Plan to see back to see if this helps diurese  She is aware of risk with med vs benefits but since weight gain 7 pounds since discharge feel it is necessary.  - Comprehensive metabolic panel (BMP + Alb, Alk Phos, ALT, AST, Total. Bili, TP)  - Magnesium    4. Coagulopathy (H)       5. Hepatic encephalopathy (H)  As above    6. Abnormal weight gain  As above   - BNP-N terminal pro    7. Bilateral leg edema  As above  - furosemide (LASIX) 20 MG tablet; Take 1 tablet (20 mg) by mouth 2 times daily  Dispense: 14 tablet; Refill: 0     Tobacco Cessation:   reports that she has been smoking cigarettes. She has a 23.00 pack-year smoking history. She has never used smokeless tobacco.  Tobacco Cessation Action Plan: not addressed today       BMI:   Estimated body mass index is 29.21 kg/m  as calculated from the following:    Height as of this encounter: 1.626 m (5' 4\").    Weight as of this " encounter: 77.2 kg (170 lb 3.2 oz).       Return in about 1 week (around 11/27/2019) for follow-up.    Concepcion Mcintyre, [facility]

## 2019-11-20 NOTE — NURSING NOTE
"Chief Complaint   Patient presents with     Hospital F/U     /64   Pulse 107   Temp 98.2  F (36.8  C) (Oral)   Resp 14   Ht 1.626 m (5' 4\")   Wt 77.2 kg (170 lb 3.2 oz)   LMP 09/14/2001   SpO2 100%   BMI 29.21 kg/m   Estimated body mass index is 29.21 kg/m  as calculated from the following:    Height as of this encounter: 1.626 m (5' 4\").    Weight as of this encounter: 77.2 kg (170 lb 3.2 oz).  bp completed using cuff size: regular      Health Maintenance addressed:  Fit     Pt will complete.    Marva Monteiro MA    "

## 2019-11-21 NOTE — TELEPHONE ENCOUNTER
Forms received from [employer] for signature  Placed forms:  On your desk  Forms need to be faxed to [phone]    Patient call? no  Copy sent to scanning? yes    All.TONA Fallon

## 2019-11-22 PROBLEM — N17.9 AKI (ACUTE KIDNEY INJURY) (H): Status: ACTIVE | Noted: 2019-01-01

## 2019-11-22 NOTE — ED TRIAGE NOTES
ABCs intact. Pt was recently admitted 11/10 for hepatic encephalopathy with acute kidney infection. Pt was seen by PMD on Wednesday and had some labs done. Pt was called to come to ER d/t creat 4.76. Pt c/o acites, bilateral black eyes denies injury.

## 2019-11-22 NOTE — PHARMACY-ADMISSION MEDICATION HISTORY
Admission medication history interview status for this patient is complete. See [facility] admission navigator for allergy information, prior to admission medications and immunization status.     Medication history interview source(s):Patient &  in room  Medication history resources (including written lists, pill bottles, clinic record): Discharge summary from 11/15/19.  [facility]    Changes made to PTA medication list:  Added:  None  Deleted:  None  Changed:   - Voltaren gel scheduled ---> prn  - Lactulose 20g (30ml) daily ---> 10g (15ml) daily    Actions taken by pharmacist (provider contacted, etc): None     Additional medication history information:  - This morning would be the last dose of Bactrim DS, pt to administer dose yet.    Medication reconciliation/reorder completed by provider prior to medication history?  No     Do you take OTC medications (eg tylenol, ibuprofen, fish oil, eye/ear drops, etc)? Yes     For patients on insulin therapy: No      Prior to Admission medications    Medication Sig Last Dose Taking? Auth Provider   Cyanocobalamin (B-12) 1000 MCG TBCR Take 1,000 mcg by mouth daily 11/21/2019 at am Yes Lavern Soriano MD   diclofenac (VOLTAREN) 1 % topical gel Place 2 g onto the skin 3 times daily as needed for moderate pain prn Yes Unknown, Entered By History   fish oil-omega-3 fatty acids 1000 MG capsule Take 2 g by mouth daily 11/21/2019 at am Yes Unknown, Entered By History   folic acid (FOLVITE) 1 MG tablet Take 1 tablet (1 mg) by mouth daily 11/21/2019 at am Yes Concepcion Mcintyre DO   furosemide (LASIX) 20 MG tablet Take 1 tablet (20 mg) by mouth 2 times daily 11/21/2019 at pm Yes Concepcion Mcintyre DO   gabapentin (NEURONTIN) 100 MG capsule Take 1 capsule (100 mg) by mouth 2 times daily 11/21/2019 at pm Yes Van Fernandez MD   lactulose (CHRONULAC) 10 GM/15ML solution Take 10 g by mouth daily  11/21/2019 at   Yes Van Fernandez MD   Lidocaine  (LIDOCARE) 4 % Patch Place 1 patch onto the skin every 24 hours To prevent lidocaine toxicity, patient should be patch free for 12 hrs daily. prn Yes Van Fernandez MD   melatonin 5 MG tablet Take 5 mg by mouth At Bedtime 11/21/2019 at pm Yes Unknown, Entered By History   menthol, Topical Analgesic, 2.5% (BENGAY VANISHIN SCENT) 2.5 % GEL topical gel Apply topically every 6 hours as needed for moderate pain prn Yes Van Fernandez MD   multivitamin, therapeutic (THERA-VIT) TABS tablet Take 1 tablet by mouth daily 11/21/2019 at   Yes Dudley Harper MD   omeprazole (PRILOSEC) 20 MG DR capsule Take 1 capsule (20 mg) by mouth daily 11/21/2019 at   Yes Concepcion Mcintyre DO   spironolactone (ALDACTONE) 25 MG tablet Take 1 tablet (25 mg) by mouth 2 times daily 11/21/2019 at pm Yes Dudley Harper MD   sulfamethoxazole-trimethoprim (BACTRIM DS/SEPTRA DS) 800-160 MG tablet Take 1 tablet by mouth 2 times daily for 7 days 11/21/2019 at pm Yes Van Fernandez MD   traZODone (DESYREL) 50 MG tablet Take 75 mg by mouth At Bedtime 11/21/2019 at pm Yes Unknown, Entered By History   vilazodone (VIIBRYD) 40 MG TABS tablet Take 1 tablet (40 mg) by mouth daily 11/21/2019 at am Yes Concepcion Mcintyre DO   vitamin B1 (THIAMINE) 100 MG tablet Take 1 tablet (100 mg) by mouth daily 11/21/2019 at am Yes Van Fernandez MD   vitamin D3 (CHOLECALCIFEROL) 2000 units (50 mcg) tablet Take 1 tablet (2,000 Units) by mouth daily 11/21/2019 at   Yes Brooklyn Vega MD   ondansetron (ZOFRAN-ODT) 4 MG ODT tab Take 1 tablet (4 mg) by mouth every 6 hours as needed for nausea or vomiting Did not start yet  Dudley Harper MD   order for DME Equipment being ordered: Walker Wheels () and Walker ()  Treatment Diagnosis: hepatic encephalopathy, UTI and impaired gait/balance   Sylvia, Nicanor Thorne MD

## 2019-11-22 NOTE — CONSULTS
[facility]    Nephrology Consultation     Date of Admission:  11/22/2019    Assessment & Plan      Radha Turcios is a 59 year old female who was admitted on 11/22/2019.     1) FIGUEROA:  Likely due to NSAID + spironolactone + Bactrim in setting of advanced liver disease with tenuous renal cortical blood flow.    2) History of FIGUEROA x 2 in recent past.      3) Chronic ETOH related liver disease.    4) Discussion: I advised that this episode of FIGUEROA could resolve the way the other two episodes did recently.  However, there is no certainty about this. It is possible things worsen to the point of requiring dialysis to ensure her survival.  She indicates that under those circumstances she would not want to undergo dialysis.  She understands that this would mean that she would die of kidney failure.        Plan:    Agree with holding NSAID, spironolactone and Bactrim.  Serial blood chemistries to follow renal function and electrolytes.    I await urine studies - Gail DIAZ MD  [facility] - Nephrology  [phone]    Reason for Consult      I was asked to see the patient for FIGUEROA.     Primary Care Physician      Concepcion Mcintyre    Chief Complaint     FIGUEROA    History is obtained from the patient and chart review.      History of Present Illness      Radha Turcios is a 59 year old female who presents with FIGUEROA.    She was discharged from [facility] on 11/15[facility]19 after hospital stay for FIGUEROA, hepatic encephalopathy and  ESBL UTI.  Her FIGUEROA did improve to a cr of 1.14 at time of discharge.  She was sent home on Bactrim DS 1 BID for the ESBL and spironolactone 25 mg BID. At home she was also taking ibuprofen 800-1200 mg daily for chronic low back pain.    She had hospital follow up at PMD on Wed at which appt labs were drawn.  Her Cr returned at 4.76 so she was advised to go to ED.    Today cr was up to 5.44.    She feels poorly and wants to go home  She wants to quit suffering.      No nausea.  SOB at  times when abdomen is very distended.      Past Medical History   I have reviewed this patient's medical history and updated it with pertinent information if needed.   Past Medical History:   Diagnosis Date     Abnormal pap     distant past  normal since then     Allergic rhinitis      Anxiety      Depression      Elevated blood protein     resolved  negative serum protein electrophoresis      GERD (gastroesophageal reflux disease)      Hyperlipemia      Insomnia      Macrocytosis without anaemia     unclear cause      Panic disorder with agoraphobia, severe agoraphobic avoidance and mild panic attacks     stable on viibryd/xanax- failed paxil/ zoloft/ wellbutrin/seroquel/effexor/klonopin     Recurrent cold sores      Tobacco abuse        Past Surgical History   I have reviewed this patient's surgical history and updated it with pertinent information if needed.  Past Surgical History:   Procedure Laterality Date     NO HISTORY OF SURGERY         Prior to Admission Medications   Prior to Admission Medications   Prescriptions Last Dose Informant Patient Reported? Taking?   Cyanocobalamin (B-12) 1000 MCG TBCR 11/21/2019 at am  No Yes   Sig: Take 1,000 mcg by mouth daily   Lidocaine (LIDOCARE) 4 % Patch prn  No Yes   Sig: Place 1 patch onto the skin every 24 hours To prevent lidocaine toxicity, patient should be patch free for 12 hrs daily.   diclofenac (VOLTAREN) 1 % topical gel prn  Yes Yes   Sig: Place 2 g onto the skin 3 times daily as needed for moderate pain   fish oil-omega-3 fatty acids 1000 MG capsule 11/21/2019 at am  Yes Yes   Sig: Take 2 g by mouth daily   folic acid (FOLVITE) 1 MG tablet 11/21/2019 at am  No Yes   Sig: Take 1 tablet (1 mg) by mouth daily   furosemide (LASIX) 20 MG tablet 11/21/2019 at pm  No Yes   Sig: Take 1 tablet (20 mg) by mouth 2 times daily   gabapentin (NEURONTIN) 100 MG capsule 11/21/2019 at pm  No Yes   Sig: Take 1 capsule (100 mg) by mouth 2 times daily   lactulose (CHRONULAC) 10  GM/15ML solution 11/21/2019 at    Yes Yes   Sig: Take 10 g by mouth daily    melatonin 5 MG tablet 11/21/2019 at pm  Yes Yes   Sig: Take 5 mg by mouth At Bedtime   menthol, Topical Analgesic, 2.5% (BENGAY VANISHIN SCENT) 2.5 % GEL topical gel prn  No Yes   Sig: Apply topically every 6 hours as needed for moderate pain   multivitamin, therapeutic (THERA-VIT) TABS tablet 11/21/2019 at    No Yes   Sig: Take 1 tablet by mouth daily   omeprazole (PRILOSEC) 20 MG DR capsule 11/21/2019 at    No Yes   Sig: Take 1 capsule (20 mg) by mouth daily   ondansetron (ZOFRAN-ODT) 4 MG ODT tab Did not start yet  No No   Sig: Take 1 tablet (4 mg) by mouth every 6 hours as needed for nausea or vomiting   order for DME   No No   Sig: Equipment being ordered: Walker Wheels () and Walker ()  Treatment Diagnosis: hepatic encephalopathy, UTI and impaired gait/balance   spironolactone (ALDACTONE) 25 MG tablet 11/21/2019 at pm  No Yes   Sig: Take 1 tablet (25 mg) by mouth 2 times daily   sulfamethoxazole-trimethoprim (BACTRIM DS/SEPTRA DS) 800-160 MG tablet 11/21/2019 at pm  No Yes   Sig: Take 1 tablet by mouth 2 times daily for 7 days   traZODone (DESYREL) 50 MG tablet 11/21/2019 at pm  Yes Yes   Sig: Take 75 mg by mouth At Bedtime   vilazodone (VIIBRYD) 40 MG TABS tablet 11/21/2019 at am  No Yes   Sig: Take 1 tablet (40 mg) by mouth daily   vitamin B1 (THIAMINE) 100 MG tablet 11/21/2019 at am  No Yes   Sig: Take 1 tablet (100 mg) by mouth daily   vitamin D3 (CHOLECALCIFEROL) 2000 units (50 mcg) tablet 11/21/2019 at    No Yes   Sig: Take 1 tablet (2,000 Units) by mouth daily      Facility-Administered Medications: None     Allergies   Allergies   Allergen Reactions     Amoxicillin      Rash       Social History   I have reviewed this patient's social history and updated it with pertinent information if needed. Radha Turcios  reports that she has been smoking cigarettes. She has a 23.00 pack-year smoking history. She has never used  smokeless tobacco. She reports current alcohol use. She reports that she does not use drugs.    Family History   I have reviewed this patient's family history and updated it with pertinent information if needed.   Family History   Problem Relation Age of Onset     Lipids Mother      Heart Disease Mother      Alzheimer Disease Mother      Allergies Mother      C.A.D. Mother         had MI     Cancer Father 58        pancreatic or prostate?     Heart Disease Brother      C.A.D. Brother         2 brothers  of heart attacks/ one also had lung caner     Cancer Brother         2 brothers   of lung cancer     Cancer Sister         lung     Alcohol/Drug Sister          alcoholic cirrhosis     Depression Sister         anxiety disorder     Depression Brother         anxiety disorder     Colon Cancer Sister 51     Breast Cancer Sister        Review of Systems   The 10 point Review of Systems is negative other than noted in the HPI.     Physical Exam   Temp: 97.7  F (36.5  C) Temp src: Oral BP: (!) 97/38 Pulse: 98 Heart Rate: 98 Resp: 28 SpO2: 98 % O2 Device: None (Room air)    Vital Signs with Ranges  Temp:  [97.7  F (36.5  C)-98.6  F (37  C)] 97.7  F (36.5  C)  Pulse:  [] 98  Heart Rate:  [] 98  Resp:  [20-30] 28  BP: ()/(38-56) 97/38  SpO2:  [98 %-100 %] 98 %  174 lbs 4.8 oz    GENERAL: ill appearing, lying in bed  HEENT:  Normocephalic. Bilateral periorbital ecchymoses,  Pupils equal.  MMM.  Dentition is fair.  CV: RRR, no murmurs, no clicks, gallops, or rubs, 2+ BLE edema, no carotid bruits  RESP: Clear bilaterally with good efforts  GI: Abdomen soft/nt, distended with fluid wave, BS normal. No masses, organomegaly  MUSCULOSKELETAL: extremities nl - no gross deformities noted aside from edema  SKIN: no suspicious lesions  NEURO:  Strength normal and symmetric.   PSYCH: unhappy, irritable  LYMPH: No palpable ant/post cervical and supraclavicular adenopathy    Data   BMP  Recent Labs   Lab  11/22/19  0948 11/20/19  1253   * 133   POTASSIUM 4.6 5.1   CHLORIDE 103 106   KARLEY 7.1* 8.1*   CO2 13* 15*   BUN 55* 48*   CR 5.44* 4.76*   GLC 81 71     Phos@LABRCNTIPR(phos:4)  CBC)  Recent Labs   Lab 11/22/19 0948 11/20/19  1253   WBC 7.0 7.4   HGB 7.2* 8.2*   HCT 20.6* 24.3*   MCV 97 96   PLT 63* 71*     Recent Labs   Lab 11/22/19  0948   AST 51*   ALT 19   ALKPHOS 100   BILITOTAL 0.7   BARBRA 23     Recent Labs   Lab 11/22/19  0948   INR 1.47*     No results found for: D2VIT, D3VIT, DTOT  Recent Labs   Lab 11/22/19  0948   HGB 7.2*   HCT 20.6*   MCV 97     No results for input(s): PTHI in the last 168 hours.

## 2019-11-22 NOTE — H&P
[facility] Internal Medicine  History and Physical      Patient Name: Radha Turcios MRN# [id number]   Age: 59 year old YOB: 1960     Date of Admission:11/22/2019    Primary care provider: Concepcion Mcintyre  Date of Service: 11/22/2019         Assessment and Plan:   Radha Turcios is a 59 year old female with a history of Chronic Back Pain, Anxiety, GERD, Tobacco Dependence, Thrombocytopenia, Alcohol Abuse, Alcoholic Liver Cirrhosis, Anasarca, Chronic Anemia, CKD, ESBL E.Coli UTI, Hepatic Encephalopathy, Ascites who presents to the ED today after being called by her PCP with an abnormal creatinine level.    Acute Kidney Injury - Patient has creatinine at admission of 5.44 increased from 1.14 on 11/15.  Elevated creatinine is multifactorial due to underlying cirrhosis with anasarca, intravascular volume depletion from poor oral intake, recent Bactrim use for UTI, daily ibuprofen use of 800-1200mg daily, Spironolactone and recent addition of Lasix.  - renal ultrasound  - Nephrology consult  - started on 500ml NS in the ED  - will order Albumin  - check UA and VBG  - hold pta NSAIDs, Bactrim, Spironolactone and Lasix    Alcoholic Liver Cirrhosis - complicated by a history of Hepatic Encephalopathy, Ascites, Thrombocytopenia, Anemia, Coagulopathy.  Last drink 9/2019.  No evidence of HE on admission; normal albumin.  MELD score 28.  Increased ascites on exam without abdominal pain, fever or elevated wbc.  Low suspicion for sbp.  - hold pta Spironolactone and Lasix 2/2 FIGUEROA  - consider therapeutic paracentesis when FIGUEROA improved; abdominal ultrasound today to assess volume  - continue pta Lactulose, MVI, Thiamine, Folic Acid   - daily CMP    Anemia, Thrombocytopenia - pt denies GIB symptoms, has spontaneous periorbital ecchymosis on exam and recent epistaxis and very mild hematuria reported.  Hgb 7.2 down from 8.4 last week and plt 63 down from 108 last week.    - monitor daily    Shortness of breath -  shortness of breath without chest pain or URI symptoms.  Evidence of fluid overload on exam with peripheral edema.  Large abdominal distension may be contributing.  - obtain a screening CXR    GERD - continue pta Omeprazole.    Anxiety - continue pta Vilazodone and Trazodone.    Tobacco Abuse       Chronic Back Pain - continue pta Gabapentin and Lidocaine patch prn.  Hold pta NSAIDs.      CODE: Full  Diet/IVF: npo until ultrasounds completed, then low sodium  GI ppx:  Omeprazole  DVT ppx: scd    Patient discussed with Dr. Zia Orona MS PA-C  Physician Assistant   Hospitalist Service  Pager: [phone]           Chief Complaint:   Abnormal labs         HPI:   59 year old female with a history of Chronic Back Pain, Anxiety, GERD, Tobacco Dependence, Thrombocytopenia, Alcohol Abuse, Alcoholic Liver Cirrhosis, Anasarca, Chronic Anemia, CKD, ESBL E.Coli UTI, Hepatic Encephalopathy, Ascites who presents to the ED today after being called by her PCP with an abnormal creatinine level.    Patient was recently hospitalized 11/10-11/15 with Hepatic Encephalopathy, FIGUEROA and ESBL E.Coli UTI.  Patient was treated with Lactulose, IVF, Albumin.  Spironolactone was restarted at her pta dose and she was discharged on Bactrim for her UTI.  Patient was seen by her PCP in clinic 11/20 and noted to have edema and weight gain.  Lasix 20mg bid x7 days was started.  Patient reports she has gained 7 pounds since her discharge.  She has increased BLE edema, abdominal bloating, early satiety and shortness of breath she attributes to her abdominal bloating.  She has had an episodes of epistaxis and noticed bilateral eye ecchymosis two days ago without any trauma.  She denies chest pain, URI symptoms, cough, wheezing, abdominal pain, nausea, emesis, melena or hematochezia, fevers.  Patient has chronic back pain for which she takes 800-1200 mg of ibuprofen daily.  She has one dose of Bactrim left to take.  Her last drink of  alcohol was September 2019.    ED work up revealed patient tachycardic 110, afebrile on room air.  BP /50s.  Laboratory work up revealed sodium 129, bicarb 13, bun 55, creatinine 5.44, AST 51, albumin 2.1, hgb 7.2, plt 63, INR 1.47 with otherwise normal CMP, Ammonia, WBC.  CT head w/out contrast was negative.  Patient received IVF and admitted for further management.         Past Medical History:     Past Medical History:   Diagnosis Date     Abnormal pap     distant past  normal since then     Allergic rhinitis      Anxiety      Depression      Elevated blood protein     resolved  negative serum protein electrophoresis      GERD (gastroesophageal reflux disease)      Hyperlipemia      Insomnia      Macrocytosis without anaemia     unclear cause      Panic disorder with agoraphobia, severe agoraphobic avoidance and mild panic attacks     stable on viibryd/xanax- failed paxil/ zoloft/ wellbutrin/seroquel/effexor/klonopin     Recurrent cold sores      Tobacco abuse           Past Surgical History:     Past Surgical History:   Procedure Laterality Date     NO HISTORY OF SURGERY            Social History:     Social History     Socioeconomic History     Marital status:      Spouse name: Not on file     Number of children: Not on file     Years of education: Not on file     Highest education level: Not on file   Occupational History     Not on file   Social Needs     Financial resource strain: Not on file     Food insecurity:     Worry: Not on file     Inability: Not on file     Transportation needs:     Medical: Not on file     Non-medical: Not on file   Tobacco Use     Smoking status: Current Every Day Smoker     Packs/day: 1.00     Years: 23.00     Pack years: 23.00     Types: Cigarettes     Smokeless tobacco: Never Used   Substance and Sexual Activity     Alcohol use: Yes     Alcohol/week: 0.0 standard drinks     Comment: 4-5 drinks per week     Drug use: No     Sexual activity: Never     Partners: Male    Lifestyle     Physical activity:     Days per week: Not on file     Minutes per session: Not on file     Stress: Not on file   Relationships     Social connections:     Talks on phone: Not on file     Gets together: Not on file     Attends Shinto service: Not on file     Active member of club or organization: Not on file     Attends meetings of clubs or organizations: Not on file     Relationship status: Not on file     Intimate partner violence:     Fear of current or ex partner: Not on file     Emotionally abused: Not on file     Physically abused: Not on file     Forced sexual activity: Not on file   Other Topics Concern     Parent/sibling w/ CABG, MI or angioplasty before 65F 55M? No      Service Not Asked     Blood Transfusions Not Asked     Caffeine Concern Not Asked     Occupational Exposure Not Asked     Hobby Hazards Not Asked     Sleep Concern Not Asked     Stress Concern Not Asked     Weight Concern Not Asked     Special Diet Not Asked     Back Care Not Asked     Exercise Not Asked     Bike Helmet Not Asked     Seat Belt Not Asked     Self-Exams Not Asked   Social History Narrative    6 of 11 siblings have passed away    Brother  of MI age 51  had lung cancer    Brother MI     Sister  alcoholic Liver disease/Cirrhosis    Sister  age 48 lung cancer was a non smoker     Brother  Lung cancer  Smoker     Brother  stomach cancer         Dad pancreatic cancer age 58     Mom lived to age 89          Family History:     Family History   Problem Relation Age of Onset     Lipids Mother      Heart Disease Mother      Alzheimer Disease Mother      Allergies Mother      C.A.D. Mother         had MI     Cancer Father 58        pancreatic or prostate?     Heart Disease Brother      C.A.D. Brother         2 brothers  of heart attacks/ one also had lung caner     Cancer Brother         2 brothers   of lung cancer     Cancer Sister         lung     Alcohol/Drug Sister           alcoholic cirrhosis     Depression Sister         anxiety disorder     Depression Brother         anxiety disorder     Colon Cancer Sister 51     Breast Cancer Sister           Allergies:      Allergies   Allergen Reactions     Amoxicillin      Rash          Medications:     Prior to Admission medications    Medication Sig Last Dose Taking? Auth Provider   Cyanocobalamin (B-12) 1000 MCG TBCR Take 1,000 mcg by mouth daily   Lavern Soriano MD   diclofenac (VOLTAREN) 1 % topical gel Place 2 g onto the skin 3 times daily   Van Fernandez MD   fish oil-omega-3 fatty acids 1000 MG capsule Take 2 g by mouth daily   Unknown, Entered By History   folic acid (FOLVITE) 1 MG tablet Take 1 tablet (1 mg) by mouth daily   Concepcion Mcintyre DO   furosemide (LASIX) 20 MG tablet Take 1 tablet (20 mg) by mouth 2 times daily   Concepcion Mcintyre DO   gabapentin (NEURONTIN) 100 MG capsule Take 1 capsule (100 mg) by mouth 2 times daily   Van Fernandez MD   lactulose (CHRONULAC) 10 GM/15ML solution Take 30 mLs (20 g) by mouth daily   Van Fernandez MD   Lidocaine (LIDOCARE) 4 % Patch Place 1 patch onto the skin every 24 hours To prevent lidocaine toxicity, patient should be patch free for 12 hrs daily.   Van Fernandez MD   melatonin 3 MG CAPS Taking 5mg at bedtime   Concepcion Mcintyre DO   menthol, Topical Analgesic, 2.5% (BENGAY VANISHIN SCENT) 2.5 % GEL topical gel Apply topically every 6 hours as needed for moderate pain   Van Fernandez MD   multivitamin, therapeutic (THERA-VIT) TABS tablet Take 1 tablet by mouth daily   Dudley Harper MD   omeprazole (PRILOSEC) 20 MG DR capsule Take 1 capsule (20 mg) by mouth daily   Concepcion Mcintyre DO   ondansetron (ZOFRAN-ODT) 4 MG ODT tab Take 1 tablet (4 mg) by mouth every 6 hours as needed for nausea or vomiting   Dudley Harper MD   order for DME Equipment being ordered: Walker Wheels () and Walker ()  Treatment Diagnosis: hepatic encephalopathy, UTI  "and impaired gait/balance   Sylvia, Nicanor Thorne MD   spironolactone (ALDACTONE) 25 MG tablet Take 1 tablet (25 mg) by mouth 2 times daily   Dudley Harper MD   sulfamethoxazole-trimethoprim (BACTRIM DS/SEPTRA DS) 800-160 MG tablet Take 1 tablet by mouth 2 times daily for 7 days   Van Fernandez MD   traZODone (DESYREL) 50 MG tablet Take 75 mg by mouth At Bedtime   Unknown, Entered By History   vilazodone (VIIBRYD) 40 MG TABS tablet Take 1 tablet (40 mg) by mouth daily   Concepcion Mcintyre DO   vitamin B1 (THIAMINE) 100 MG tablet Take 1 tablet (100 mg) by mouth daily   Van Fernandez MD   vitamin D3 (CHOLECALCIFEROL) 2000 units (50 mcg) tablet Take 1 tablet (2,000 Units) by mouth daily   Brooklyn Vega MD          Review of Systems:   A complete ROS was performed and is negative other than what is stated in the HPI.       Physical Exam:   Blood pressure 99/54, pulse 100, temperature 98.6  F (37  C), temperature source Temporal, resp. rate 30, height 1.626 m (5' 4\"), weight 78.7 kg (173 lb 8 oz), last menstrual period 09/14/2001, SpO2 100 %, not currently breastfeeding.  General: Alert, interactive, NAD, sitting up in bed with  at the bedside.  HEENT: bilateral eye ecchymosis.  Chest/Resp: clear to auscultation bilaterally, no crackles or wheezes  Heart/CV: regular rate and rhythm, no murmur  Abdomen/GI: Soft, nontender, distended. Decreased BS.  No rebound or guarding.  Extremities/MSK: 2+ pitting BLE edema  Skin: right anterior ankle with a superficial skin tear.  Neuro: Alert & oriented x 3, no focal deficits, moves all extremities equally, mild asterixis         Labs:   ROUTINE ICU LABS (Last four results)  CMP  Recent Labs   Lab 11/22/19  0948 11/20/19  1253   * 133   POTASSIUM 4.6 5.1   CHLORIDE 103 106   CO2 13* 15*   ANIONGAP 13 12   GLC 81 71   BUN 55* 48*   CR 5.44* 4.76*   GFRESTIMATED 8* 9*   GFRESTBLACK 9* 11*   KARLEY 7.1* 8.1*   MAG  --  1.4*   PROTTOTAL 6.5* 6.8   ALBUMIN 2.1* " 2.3*   BILITOTAL 0.7 0.7   ALKPHOS 100 115   AST 51* 39   ALT 19 22     CBC  Recent Labs   Lab 11/22/19  0948 11/20/19  1253   WBC 7.0 7.4   RBC 2.12* 2.52*   HGB 7.2* 8.2*   HCT 20.6* 24.3*   MCV 97 96   MCH 34.0* 32.5   MCHC 35.0 33.7   RDW 17.2* 17.5*   PLT 63* 71*     INR  Recent Labs   Lab 11/22/19  0948   INR 1.47*          Imaging/Procedures:     Results for orders placed or performed during the hospital encounter of 11/22/19   Head CT w/o contrast    Narrative    CT SCAN OF THE HEAD WITHOUT CONTRAST   11/22/2019 10:23 AM     HISTORY: Altered level of consciousness (LOC), unexplained.  Periorbital ecchymosis, suspect trauma.    TECHNIQUE:  Axial images of the head and coronal reformations without  IV contrast material. Radiation dose for this scan was reduced using  automated exposure control, adjustment of the mA and/or kV according  to patient size, or iterative reconstruction technique.    COMPARISON: Head CT 11/10/2019    FINDINGS: Mild volume loss is present. Patchy white matter  hypoattenuation likely represents chronic small ischemic change. No  evidence of acute ischemia, hemorrhage, mass, mass effect, or  hydrocephalus. The visualized calvarium, tympanic cavities, mastoid  cavities, and paranasal sinuses are unremarkable.      Impression    IMPRESSION:  No acute intracranial abnormality.    BEAR CARRANZA MD

## 2019-11-22 NOTE — PROGRESS NOTES
New admit at 1300: Pt/spouse was oriented to room, use of call light, calling for assistance, safety, poc, etc.  Pt/spouse verbalized understanding of this.  Vss, no co pain/cp, dao/sob. Tele SR PACs.  Up with Ax1+gb+walker, +BM and void in toilet prior to hat placement, will be needing a urine sample.  BLE edema, drsg applied to open blister after cleansed with wound spray, scab to L heel also noted.  Medications will need to be given as pt has been off of the floor multiple times since being admitted.  Will continue to monitor and update evening RN on the above.

## 2019-11-22 NOTE — TELEPHONE ENCOUNTER
Received form(s) from [facility] for need for skilled nursing.  Placed form(s) in/on PN's box.  Forms need to be filled out and signed and faxed to number listed on form..    Call pt to verify form was sent: No  Copy needs to be sent for scanning after completion: Yes

## 2019-11-22 NOTE — RESULT ENCOUNTER NOTE
I have attempted to call multiple times -   Triage - please try to reach this AM - her creatinine is dangerously high  I need to recheck if she is feeling better or she needs to go to the ER  Thanks  PN

## 2019-11-22 NOTE — ED PROVIDER NOTES
"  History     Chief Complaint:  Abnormal Labs    HPI   Radha Turcios is a 59 year old female who presents with elevated creatinine from outpatient office visit on 11/20.    Hx alcohol use disorder with recent diagnosis (10/2019) of EtOH cirrhosis, hepatic encephalopathy, FIGUEROA thought 2/2 dehydration (Cr 4.12 on 10/7). Last admitted 11/10-11/15 with ongoing issues of confusion 2/2 hepatic encephalopathy, ESBL urinary tract infection. Also had suspect GIB on recent hospitalizations requiring transfusion - plan for outpatient endoscopy, colonoscopy. On hospital discharge 11/15, Cr was 1.14.    In clinic Wed 11/20 - increased edema, 7 lb weight gain since discharge, prescribed lasix. Labwork done showing Cr 4.76. Called to not take lasix, plan to re-check vs to ED for evaluation - did not get message, did take 3 doses of lasix (20 mg BID). No changes in urinary output, swelling with this. Currently primary complaint is ongoing confusion, nausea, pain in legs with pins and needles sensation but no change in gait, motion. Swelling in abdomen is about stable, no improvement. Dysuria has improved - last dose of bactrim was due this morning. Did not take morning meds. Has been consistent with lactulose. Last drink was September 14th.     Also notable is periorbital ecchymosis - woke up Wednesday AM with this and red upper chest rash. Denies recent trauma or falls.    Other ROS positives: confusion, chills, scant blood in stool, mouth sores, tongue sores, decreased PO intake but still taking in fluids, \"vaginal swelling,\" residual vaginal discharge from prior yeast infection.    Allergies:    Allergies   Allergen Reactions     Amoxicillin      Rash        Medications:      No current outpatient medications on file.      Problem List:      Patient Active Problem List    Diagnosis Date Noted     Acute respiratory failure with hypoxia (H) 10/08/2019     Priority: Medium     GI bleed 10/08/2019     Priority: Medium     Hepatic " encephalopathy (H) 10/07/2019     Priority: Medium     Alcoholic cirrhosis of liver with ascites (H) 10/07/2019     Priority: Medium     ARF (acute renal failure) (H) 10/07/2019     Priority: Medium     Distal left Clavicle Fracture 10/07/2019     Priority: Medium     Lactic acidosis 10/07/2019     Priority: Medium     Hypokalemia 10/07/2019     Priority: Medium     Anemia 10/07/2019     Priority: Medium     Coagulopathy (H) 10/07/2019     Priority: Medium     Hypomagnesemia 10/07/2019     Priority: Medium     Thrombocytopenia (H) 10/07/2019     Priority: Medium     Abnormal TSH 11/04/2018     Priority: Medium     Episode of recurrent major depressive disorder, unspecified depression episode severity (H) 07/29/2018     Priority: Medium     Panic disorder with agoraphobia, severe agoraphobic avoidance and mild panic attacks      Priority: Medium     stable on viibryd/xanax- failed paxil/ zoloft/ wellbutrin/seroquel/effexor/klonopin       Smoker      Priority: Medium     Controlled substance agreement signed   01/16/2017     Priority: Medium     Insomnia 10/30/2014     Priority: Medium     Problem list name updated by automated process. Provider to review       GERD (gastroesophageal reflux disease)      Priority: Medium     Anxiety      Priority: Medium     Patient is followed by [facility] for ongoing prescription of benzodiazepines.  All refills should be approved by this provider, or covering partner.    Medication(s): Xanax.   Maximum quantity per month: 60  Clinic visit frequency required: Q 3 months     Controlled substance agreement on file: Yes       Date(s): 6/19/19  Benzodiazepine use reviewed by psychiatry:  No    Last [facility] website verification:  done on 6/19/19  [email or web address]           Mixed hyperlipidemia 10/04/2001     Priority: Medium        Past Medical History:      Past Medical History:   Diagnosis Date     Abnormal pap      Allergic rhinitis      Anxiety      Depression       Elevated blood protein      GERD (gastroesophageal reflux disease)      Hyperlipemia      Insomnia      Macrocytosis without anaemia      Panic disorder with agoraphobia, severe agoraphobic avoidance and mild panic attacks      Recurrent cold sores      Tobacco abuse        Past Surgical History:      Past Surgical History:   Procedure Laterality Date     NO HISTORY OF SURGERY         Family History:      Family History   Problem Relation Age of Onset     Lipids Mother      Heart Disease Mother      Alzheimer Disease Mother      Allergies Mother      C.A.D. Mother         had MI     Cancer Father 58        pancreatic or prostate?     Heart Disease Brother      C.A.D. Brother         2 brothers  of heart attacks/ one also had lung caner     Cancer Brother         2 brothers   of lung cancer     Cancer Sister         lung     Alcohol/Drug Sister          alcoholic cirrhosis     Depression Sister         anxiety disorder     Depression Brother         anxiety disorder     Colon Cancer Sister 51     Breast Cancer Sister        Social History:    Marital Status:   [2]  Social History     Tobacco Use     Smoking status: Current Every Day Smoker     Packs/day: 1.00     Years: 23.00     Pack years: 23.00     Types: Cigarettes     Smokeless tobacco: Never Used   Substance Use Topics     Alcohol use: Yes     Alcohol/week: 0.0 standard drinks     Comment: 4-5 drinks per week     Drug use: No        Review of Systems   Constitutional: Positive for chills. Negative for appetite change, fatigue, fever and unexpected weight change.   HENT: Negative for congestion, rhinorrhea, sore throat and tinnitus.    Eyes: Negative for visual disturbance.   Respiratory: Positive for shortness of breath (due to distension of abdomen). Negative for cough and chest tightness.    Cardiovascular: Positive for leg swelling. Negative for chest pain and palpitations.   Gastrointestinal: Positive for blood in stool, diarrhea  "(taking lactulose) and nausea. Negative for abdominal pain, constipation and vomiting.   Genitourinary: Negative for decreased urine volume, dysuria, flank pain and frequency.   Musculoskeletal: Positive for back pain and myalgias.   Skin: Positive for rash.   Neurological: Positive for weakness and headaches. Negative for dizziness.   Psychiatric/Behavioral: Positive for confusion.       Physical Exam   First Vitals:  BP: 109/47  Pulse: 110  Heart Rate: 103  Temp: 98.6  F (37  C)  Resp: 20  Height: 162.6 cm (5' 4\")  Weight: 78.7 kg (173 lb 8 oz)  SpO2: 100 %      Physical Exam  Constitutional:       General: She is not in acute distress.     Appearance: She is well-developed and underweight. She is not diaphoretic.   HENT:      Head: Normocephalic and atraumatic.      Right Ear: Tympanic membrane normal.      Left Ear: Tympanic membrane normal.      Ears:      Comments: No hemotympanum     Mouth/Throat:      Pharynx: No oropharyngeal exudate.      Comments: Atrophic tongue. Oral mucosa erythema, ulcerations  Eyes:      General: Scleral icterus (mild) present.      Extraocular Movements: Extraocular movements intact.      Conjunctiva/sclera: Conjunctivae normal.      Pupils: Pupils are equal, round, and reactive to light.      Comments: Periorbital ecchymosis   Neck:      Musculoskeletal: Normal range of motion. No muscular tenderness.   Cardiovascular:      Rate and Rhythm: Regular rhythm. Tachycardia present.      Pulses: Normal pulses.      Heart sounds: No murmur.   Pulmonary:      Effort: Pulmonary effort is normal. No respiratory distress.      Breath sounds: No wheezing or rales.   Chest:      Chest wall: No tenderness.   Abdominal:      General: Bowel sounds are normal.      Tenderness: There is no abdominal tenderness. There is no rebound.      Comments: Distended, not tense. Dull to percussion throughout   Musculoskeletal: Normal range of motion.      Right lower leg: Edema (to thighs, tender) present.     "  Left lower leg: Edema (to thighs, tender) present.      Comments: No increased pain with calf stretch bilaterally. Sensation intact but abnormal bilaterally   Lymphadenopathy:      Cervical: No cervical adenopathy.   Skin:     General: Skin is warm.      Coloration: Skin is not jaundiced.      Comments: Upper chest erythematous dispersed macular rash. Periorbital ecchymosis.   Neurological:      General: No focal deficit present.      Mental Status: She is alert and oriented to person, place, and time.       Emergency Department Course     Imaging:  Head CT w/o contrast   Final Result   IMPRESSION:  No acute intracranial abnormality.      BEAR CARRANZA MD          Laboratory:  Labs Ordered and Resulted from Time of ED Arrival Up to the Time of Departure from the ED   CBC WITH PLATELETS DIFFERENTIAL - Abnormal; Notable for the following components:       Result Value    RBC Count 2.12 (*)     Hemoglobin 7.2 (*)     Hematocrit 20.6 (*)     MCH 34.0 (*)     RDW 17.2 (*)     Platelet Count 63 (*)     All other components within normal limits   COMPREHENSIVE METABOLIC PANEL - Abnormal; Notable for the following components:    Sodium 129 (*)     Carbon Dioxide 13 (*)     Urea Nitrogen 55 (*)     Creatinine 5.44 (*)     GFR Estimate 8 (*)     GFR Estimate If Black 9 (*)     Calcium 7.1 (*)     Albumin 2.1 (*)     Protein Total 6.5 (*)     AST 51 (*)     All other components within normal limits   INR - Abnormal; Notable for the following components:    INR 1.47 (*)     All other components within normal limits   AMMONIA   CK TOTAL   ROUTINE UA WITH MICROSCOPIC REFLEX TO CULTURE   UREA NITROGEN RANDOM URINE   CREATINE TIMED RANDOM URINE   SODIUM RANDOM URINE           Interventions:  11:05 AM NS 50 ml given    Emergency Department Course:  09:06 Past medical records, nursing notes, and vitals reviewed. I performed an exam of the patient and obtained history, as documented above.     11:08 AM Discussed with Dr. Carrion of  Nephrology. No urgent interventions at this time.     11:27 AM Discussed with hospitalist service regarding admission.         Impression & Plan         Medical Decision Makin yo F with hx of EtOH cirrhosis, hepatic encephalopathy, presenting with acute renal failure with Cr 5.44 in the setting of recent ESBL UTI treated with bactrim, as well as worsening LE edema unresolved with 3 doses of lasix (since discontinued) without change in urine output. Her initial vitals were notable for tachycardia 110, otherwise normal BP, afebrile. Exam notable for periorbital ecchymosis, non-tender abdomen and non-tense ascites, and 3+ LE edema. Given periorbital ecchymosis there was concern for head trauma but CT head without fracture, acute intracranial abnormalities. Her lab work was concerning for worsening FIGUEROA since last discharge and since outpatient follow-up, with worsening acidosis, with hyponatremia Na 129. At this time consider pre-renal vs ATN vs medication-induced etiology for FIGUEROA. Her case was discussed with nephrology, and at this time no urgent interventions required. She was given 500 ml NS given likely hypovolemic hyponatremia and suspected pre-renal component of FIGUEROA. A CK was checked for concern of compartment syndrome given her increasing pain, paresthesias in her lower extremities due to edema, which resulted normal. At this time she will be admitted to medicine service for ongoing management of acute renal failure.    Critical Care time:  none    Diagnosis:    ICD-10-CM    1. Acute renal failure, unspecified acute renal failure type (H) N17.9        Disposition:  Admitted to med-surg    Discharge Medications:  Current Discharge Medication List          Ann-Marie Roblero MD  2019   [facility] EMERGENCY DEPARTMENT       Ann-Marie Roblero MD  Resident  [phone]

## 2019-11-22 NOTE — ED NOTES
"[facility]  ED Nurse Handoff Report    Radha Turcios is a 59 year old female   ED Chief complaint: Abnormal Labs  . ED Diagnosis:   Final diagnoses:   None     Allergies:   Allergies   Allergen Reactions     Amoxicillin      Rash       Code Status: Full Code  Activity level - Baseline/Home:  Independent. Activity Level - Current:   Stand by Assist. Lift room needed: No. Bariatric: No   Needed: No   Isolation: Yes. Infection: Not Applicable  ESBL.     Vital Signs:   Vitals:    11/22/19 0902 11/22/19 0915 11/22/19 0930 11/22/19 0945   BP: 109/47 98/53 103/56 99/54   Pulse: 110  102 100   Resp: 20  29 30   Temp: 98.6  F (37  C)      TempSrc: Temporal      SpO2: 100%  100% 100%   Weight: 78.7 kg (173 lb 8 oz)      Height: 1.626 m (5' 4\")          Cardiac Rhythm:  ,      Pain level:    Patient confused: No. Patient Falls Risk: Yes.   Elimination Status: patient had not voided in ER. aware of need for urine sample. Reports normal urination patterns at home with urine that is \"a little dark\" yellow. denies tea colored urine. elevated creatinine.   Patient Report - Initial Complaint: abnormal labs. Focused Assessment: patient seen in clinic on Wednesday and notified to be seen for abnormal labs. Patient reports her kidney function has continued to decline and has discussed starting dialysis with her clinic provider. Patient noted new onset periorbital ecchymosis and increased bruising over body over the past couple days. Denies history of trauma or fall. Reports eye ecchymosis started \"spontaneously\". Bilateral edema of lower extremities and thighs. Reports generalized itching of skin and rash. Patient alert and oriented. Able to answer questions appropriately.   Tests Performed: labs, ct scan. Abnormal Results:   Labs Ordered and Resulted from Time of ED Arrival Up to the Time of Departure from the ED   CBC WITH PLATELETS DIFFERENTIAL - Abnormal; Notable for the following components:       Result " Value    RBC Count 2.12 (*)     Hemoglobin 7.2 (*)     Hematocrit 20.6 (*)     MCH 34.0 (*)     RDW 17.2 (*)     Platelet Count 63 (*)     All other components within normal limits   COMPREHENSIVE METABOLIC PANEL - Abnormal; Notable for the following components:    Sodium 129 (*)     Carbon Dioxide 13 (*)     Urea Nitrogen 55 (*)     Creatinine 5.44 (*)     GFR Estimate 8 (*)     GFR Estimate If Black 9 (*)     Calcium 7.1 (*)     Albumin 2.1 (*)     Protein Total 6.5 (*)     AST 51 (*)     All other components within normal limits   INR - Abnormal; Notable for the following components:    INR 1.47 (*)     All other components within normal limits   AMMONIA   CK TOTAL   ROUTINE UA WITH MICROSCOPIC REFLEX TO CULTURE   UREA NITROGEN RANDOM URINE   CREATINE TIMED RANDOM URINE   SODIUM RANDOM URINE     Head CT w/o contrast   Final Result   IMPRESSION:  No acute intracranial abnormality.      BEAR CARRANZA MD      .   Treatments provided: see MAR  Family Comments: spouse supportive at bedside  OBS brochure/video discussed/provided to patient:  N/A  ED Medications:   Medications   0.9% sodium chloride BOLUS (500 mLs Intravenous New Bag 11/22/19 1105)     Drips infusing:  No  For the majority of the shift, the patient's behavior Green. Interventions performed were reinforce plan of care.     Severe Sepsis OR Septic Shock Diagnosis Present: No      ED Nurse Name/Phone Number: Devorah Krueger RN,   11:41 AM    RECEIVING UNIT ED HANDOFF REVIEW    Above ED Nurse Handoff Report was reviewed: Yes  Reviewed by: Lavern Waldron RN on November 22, 2019 at 12:19 PM   Did you vocera the ED RN:

## 2019-11-22 NOTE — RESULT ENCOUNTER NOTE
Called patient3 attempts but no answer and machine full.  Called her  Juan A to let him know the creatinine is up significantly from her discharge - she should go to ER if she is having any trouble urinating, gaining any more weight or having any issues.  Will plan to HOLD the lasix  Will recheck ASAP in AM  PN

## 2019-11-22 NOTE — TELEPHONE ENCOUNTER
Form completed -   Please print the visit note from11/20 and fax with the other forms  Thanks  PN

## 2019-11-23 NOTE — PROGRESS NOTES
[facility]  Hospitalist Progress Note  Oswald Lizarraga MD, MD 11/23/2019  (Text Page)  Reason for Stay (Diagnosis): FIGUEROA in the setting of long-standing liver cirrhosis         Assessment and Plan:      Summary of Stay: Radha Turcios is a 59 year old female with history of complicated medical illness such as alcohol abuse, alcohol liver cirrhosis, chronic anemia, CKD, thrombocytopenia, tobacco abuse, prior hepatic encephalopathy, recent hospitalization for ESBL E. coli UTI admitted on 11/22/2019 with acute kidney injury and elevated creatinine levels    Problem List:   1. FIGUEROA likely secondary to frequent NSAIDs use, Aldactone and recent Bactrim with long-standing history of advanced alcoholic liver disease with cirrhosis  2. Hepatorenal syndrome  3. Chronic alcohol abuse  4. Chronic anemia, thrombocytopenia  5. Recent hospitalization for ESBL E. coli UTI    Patient is exhibiting low blood pressure levels earlier.  Borderline tachycardia, not septic appearing with no lactic acidosis, remained afebrile.  Fortunately still not requiring any oxygen supplementation.  She is denying any respiratory symptoms.  Trial of albumin infusion this morning with 12.5 mg and repeat after 4 hours.  Midodrine to initiate 10 mg 3 times daily.  Nephrology is aware of current clinical situation.  Earlier discussion regarding hemodialysis ensued and initially patient was refusing to consider initiation of hemodialysis.  However during this morning exam this was revisited with our patient and currently she is agreeable in pursuing hemodialysis if indicated.  We will likely also need abdominal paracentesis however limiting factor right now is her hypotension.  Low threshold and transferring to ICU service and initiation of vasopressors if remained hypotensive plus symptomatic from it.    DVT Prophylaxis: Pneumatic Compression Devices, no chemo DVT prophylaxis given underlying thrombocytopenia  Code Status: Full  "Code  Discharge Dispo: To be determined  Estimated Disch Date / # of Days until Disch: Still requiring inpatient care as she remains high risk for clinical deterioration        Interval History (Subjective):      Continuing care today.  Seen and examined.  Chart reviewed.  Case discussed with nursing service was gracious enough to be present at bedside during exam.  I found Radha sitting on the hospital bed trying to eat her breakfast tray.  Earlier she mentioned about mild dizziness and lightheadedness.  She is exhibiting hypotension.  Denies any nausea, vomiting, blurred vision, chest pain, shortness of breath, diarrhea or any bleeding tendencies.  No reported mental status changes.  Still not requiring any oxygen supplementation.  Remain afebrile.           Physical Exam:      Last Vital Signs:  BP (!) 78/34 (BP Location: Left arm)   Pulse 98   Temp 98.4  F (36.9  C) (Oral)   Resp 18   Ht 1.626 m (5' 4\")   Wt 78.8 kg (173 lb 12.8 oz)   LMP 09/14/2001   SpO2 96%   BMI 29.83 kg/m      No intake/output data recorded.  Wt Readings from Last 1 Encounters:   11/23/19 78.8 kg (173 lb 12.8 oz)     Vitals:    11/22/19 0902 11/22/19 1300 11/23/19 0549   Weight: 78.7 kg (173 lb 8 oz) 79.1 kg (174 lb 4.8 oz) 78.8 kg (173 lb 12.8 oz)       Constitutional: Awake, alert, cooperative, no apparent distress   Respiratory: Clear to auscultation bilaterally, no crackles or wheezing   Cardiovascular: Regular rate and rhythm, normal S1 and S2, and no murmur noted   Abdomen:  Nontender, normal bowel sounds, distended, ascites   Skin:  Multiple bruises, ecchymosis, periorbital hematoma bilateral   Neuro: Alert and oriented x3, no weakness, spontaneous and coherent speech   Extremities:  Bilateral pitting +2 lower extremities edema, normal range of motion   Other(s): Euthymic mood, not agitated       All other systems: Negative          Medications:      All current medications were reviewed with changes reflected in problem " list.         Data:      All new lab and imaging data was reviewed.   Labs:  No results for input(s): CULT in the last 168 hours.  Recent Labs   Lab 11/23/19  0612 11/22/19  0948 11/20/19  1253   WBC 8.4 7.0 7.4   HGB 7.0* 7.2* 8.2*   HCT 20.2* 20.6* 24.3*   MCV 95 97 96   PLT 53* 63* 71*     Recent Labs   Lab 11/23/19  0612 11/22/19  0948 11/20/19  1253   * 129* 133   POTASSIUM 4.9 4.6 5.1   CHLORIDE 103 103 106   CO2 15* 13* 15*   ANIONGAP 11 13 12   GLC 66* 81 71   BUN 62* 55* 48*   CR 5.86* 5.44* 4.76*   GFRESTIMATED 7* 8* 9*   GFRESTBLACK 8* 9* 11*   KARLEY 7.2* 7.1* 8.1*   MAG  --   --  1.4*   PROTTOTAL 6.4* 6.5* 6.8   ALBUMIN 2.3* 2.1* 2.3*   BILITOTAL 0.8 0.7 0.7   ALKPHOS 79 100 115   AST 39 51* 39   ALT 19 19 22     No results for input(s): SED, CRP in the last 168 hours.  Recent Labs   Lab 11/23/19  0612 11/22/19  0948 11/20/19  1253   GLC 66* 81 71     Recent Labs   Lab 11/23/19  0612 11/22/19  0948   INR 1.54* 1.47*     No results for input(s): LIPASE in the last 168 hours.  No results for input(s): TSH in the last 168 hours.  No results for input(s): COLOR, APPEARANCE, URINEGLC, URINEBILI, URINEKETONE, SG, UBLD, URINEPH, PROTEIN, UROBILINOGEN, NITRITE, LEUKEST, RBCU, WBCU in the last 168 hours.   Imaging:   Results for orders placed or performed during the hospital encounter of 11/22/19   Head CT w/o contrast    Narrative    CT SCAN OF THE HEAD WITHOUT CONTRAST   11/22/2019 10:23 AM     HISTORY: Altered level of consciousness (LOC), unexplained.  Periorbital ecchymosis, suspect trauma.    TECHNIQUE:  Axial images of the head and coronal reformations without  IV contrast material. Radiation dose for this scan was reduced using  automated exposure control, adjustment of the mA and/or kV according  to patient size, or iterative reconstruction technique.    COMPARISON: Head CT 11/10/2019    FINDINGS: Mild volume loss is present. Patchy white matter  hypoattenuation likely represents chronic small  ischemic change. No  evidence of acute ischemia, hemorrhage, mass, mass effect, or  hydrocephalus. The visualized calvarium, tympanic cavities, mastoid  cavities, and paranasal sinuses are unremarkable.      Impression    IMPRESSION:  No acute intracranial abnormality.    BEAR CARRANZA MD   XR Chest 2 Views    Narrative    XR CHEST 2 VW 11/22/2019 3:00 PM     HISTORY: sob     COMPARISON: 11/10/2019.      Impression    IMPRESSION: No acute pathology. Lungs clear. Normal heart size.     SANGITA HARPER MD   US Renal Complete    Narrative    ULTRASOUND RENAL COMPLETE 11/22/2019 2:16 PM     HISTORY: Acute kidney injury.     FINDINGS: The kidneys are normal in echogenicity without  hydronephrosis bilaterally. Right kidney measures 11.0 x 6.5 x 4.5 cm  and the left measures 11.3 x 4.9 x 4.7 cm. No significant renal  cortical thinning is appreciated. No renal cyst or mass. No definite  renal calculi are appreciated. Bladder is partly decompressed, but  otherwise unremarkable. Bladder is completely decompressed. Ascites is  present.      Impression    IMPRESSION:   1. No evidence of renal calculi or hydronephrosis. No renal cyst or  mass.  2. Incidental ascites.    ERASTO FAIR MD   US Abdomen Limited    Narrative    ULTRASOUND ABDOMEN LIMITED 11/22/2019 2:16 PM     HISTORY: Evaluate ascites.     FINDINGS: Ultrasound evaluation of the four quadrants of the abdomen  demonstrate a moderate-to-large amount of ascites in all four  quadrants.      Impression    IMPRESSION: Moderate-to-large amount of abdominal ascites.    ERASTO FAIR MD
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
## 2019-11-23 NOTE — PLAN OF CARE
BP 94/31, denied pain, c/o nausea, refused antiemetic medication. Up to BR with assist of one, walker and belt. Continue to monitor.

## 2019-11-23 NOTE — PLAN OF CARE
Pt remains hospitalized for FIGUEROA   A&O, forgetful at times   Soft BP's, paged MD no response   C/O lower back pain, using heating pad   PIV saline locked  Up with assist x 1 with walker  BS hypo, abdomen distended, +3 edema LE   Sepsis triggered at start of shift, Lactic 1.3

## 2019-11-23 NOTE — PROGRESS NOTES
CM had voice mail from Charmaine at [facility] (M-F [phone]). They are currently active with patient for OT, PT, RN & SW services. Will need resumption orders when patient ready for discharge. Updated AVS and placed note to physician for resumption orders.     CM will continue to follow patient until discharge for any additional needs.     Florencia Banda RN, BSN, CPHN, CM  Inpatient Care Coordination  [facility]  [phone]

## 2019-11-24 NOTE — PROGRESS NOTES
Assessment and Plan:   FIGUEROA: Cr is worsening.  She is felt to have FIGUEROA due to nonsteroidal anti-inflammatory drugs, spironolactone and Bactrim.  She may also have hepatorenal syndrome.  11/14/2019 urine sodium 6, fractional excretion of sodium less than 0.1.  This sodium avid state is consistent with hepatorenal syndrome rather than ATN due to drugs.    Urine output yesterday 750 mL.  Weight on admission 78.7, weight today 80.5.  He is hypotensive.  Yesterday she was treated with normal saline bolus and IV albumin.  She is on midodrine.  She is currently getting D5 half-normal saline with 100 of bicarbonate at 125 cc/h.    Labs today show sodium 130, potassium 3.8, bicarbonate 16.  Her creatinine has risen to 6.21.    I will replace magnesium.  We will monitor her fluid status and kidney function.  We will need to discuss the possibility of dialysis should she need it.            Interval History:   Alcoholism  Advanced alcoholic liver disease with cirrhosis  Anemia and thrombocytopenia: transfusion today.  Recent ESBL E. coli UTI                 Review of Systems:   C/O sore mouth. C/O back pain. Has facial ecchymosis and skin tears.          Medications:       cyanocobalamin  1,000 mcg Oral Daily     folic acid  1 mg Oral Daily     gabapentin  100 mg Oral Daily     lactulose  10 g Oral Daily     lidocaine   Transdermal Q8H     lidocaine   Transdermal Q24H     melatonin  5 mg Oral At Bedtime     midodrine  10 mg Oral TID w/meals     multivitamin, therapeutic  1 tablet Oral Daily     omeprazole  20 mg Oral Daily     sodium chloride (PF)  3 mL Intracatheter Q8H     traZODone  75 mg Oral At Bedtime     vilazodone  40 mg Oral Daily     vitamin B1  100 mg Oral Daily     vitamin D3  2,000 Units Oral Daily       IV infusion builder WITH additives 125 mL/hr at 11/24/19 0116     Current active medications and PTA medications reviewed, see medication list for details.            Physical Exam:   Vitals were  reviewed  Patient Vitals for the past 24 hrs:   BP Temp Temp src Heart Rate Resp SpO2 Weight   19 0805 (!) 86/43 -- -- 98 18 96 % --   19 0741 (!) 89/39 -- -- -- -- -- --   19 0740 (!) 9330 97.6  F (36.4  C) Oral 107 18 94 % --   19 0256 -- -- -- -- -- -- 80.5 kg (177 lb 6.4 oz)   19 (!)  -- -- -- -- -- --   19 011 (!)  97.8  F (36.6  C) Oral 94 18 98 % --   19 (!)  -- -- -- -- -- --   19 (!)  -- -- 93 -- 99 % --   19 1549 (!)  -- -- -- -- 98 % --   19 1548 (!) 32 98.4  F (36.9  C) Oral 95 20 97 % --       Temp:  [97.6  F (36.4  C)-98.4  F (36.9  C)] 97.6  F (36.4  C)  Heart Rate:  [] 98  Resp:  [18-20] 18  BP: (77-93)/(27-44) 86/43  SpO2:  [94 %-99 %] 96 %    Temperatures:  Current - Temp: 97.6  F (36.4  C); Max - Temp  Av.9  F (36.6  C)  Min: 97.6  F (36.4  C)  Max: 98.4  F (36.9  C)  Respiration range: Resp  Av.5  Min: 18  Max: 20  Pulse range: No data recorded  Blood pressure range: Systolic (24hrs), Av , Min:77 , Max:93   ; Diastolic (24hrs), Av, Min:27, Max:44    Pulse oximetry range: SpO2  Av %  Min: 94 %  Max: 99 %    I/O last 3 completed shifts:  In: 710 [P.O.:610]  Out: 750 [Urine:750]      Intake/Output Summary (Last 24 hours) at 2019 1029  Last data filed at 2019 0744  Gross per 24 hour   Intake 590 ml   Output 400 ml   Net 190 ml       Marked periorbital and perioral ecchymosis  Lungs with clear BS  Cor + JVD, tachy, nl S1 S2  Abd distended, + fluid wave, no tenderness  LE 2+ edema     Wt Readings from Last 4 Encounters:   19 80.5 kg (177 lb 6.4 oz)   19 77.2 kg (170 lb 3.2 oz)   11/15/19 74.3 kg (163 lb 11.2 oz)   19 72.5 kg (159 lb 12.8 oz)          Data:          Lab Results   Component Value Date     2019     2019     2019    Lab Results   Component Value Date    CHLORIDE 102 2019    CHLORIDE 103  11/23/2019    CHLORIDE 103 11/22/2019    Lab Results   Component Value Date    BUN 64 11/24/2019    BUN 62 11/23/2019    BUN 55 11/22/2019      Lab Results   Component Value Date    POTASSIUM 3.8 11/24/2019    POTASSIUM 4.9 11/23/2019    POTASSIUM 4.6 11/22/2019    Lab Results   Component Value Date    CO2 16 11/24/2019    CO2 15 11/23/2019    CO2 13 11/22/2019    Lab Results   Component Value Date    CR 6.21 11/24/2019    CR 5.86 11/23/2019    CR 5.44 11/22/2019        Recent Labs   Lab Test 11/24/19 0627 11/23/19  0612 11/22/19  0948   WBC 7.1 8.4 7.0   HGB 6.0* 7.0* 7.2*   HCT 18.4* 20.2* 20.6*   MCV 96 95 97   PLT 51* 53* 63*     Recent Labs   Lab Test 11/24/19 0627 11/23/19  0612 11/22/19  0948  11/10/19  2153  10/12/19  1412   AST 37 39 51*   < >  --    < > 70*   ALT 19 19 19   < >  --    < > 38   ALKPHOS 69 79 100   < >  --    < > 174*   BILITOTAL 0.7 0.8 0.7   < >  --    < > 1.5*   BARBRA  --   --  23  --  69*  --  60*    < > = values in this interval not displayed.       Recent Labs   Lab Test 11/24/19 0627 11/20/19  1253 11/12/19  0747   MAG 1.6 1.4* 1.9     Recent Labs   Lab Test 11/24/19 0627 11/11/19  0610 10/13/19  0930   PHOS 6.0* 4.2 4.0     Recent Labs   Lab Test 11/24/19 0627 11/23/19  0612 11/22/19  0948   KARLEY 6.9* 7.2* 7.1*       Lab Results   Component Value Date    KARLEY 6.9 (L) 11/24/2019     Lab Results   Component Value Date    WBC 7.1 11/24/2019    HGB 6.0 (LL) 11/24/2019    HCT 18.4 (L) 11/24/2019    MCV 96 11/24/2019    PLT 51 (L) 11/24/2019     Lab Results   Component Value Date     (L) 11/24/2019    POTASSIUM 3.8 11/24/2019    CHLORIDE 102 11/24/2019    CO2 16 (L) 11/24/2019    GLC 92 11/24/2019     Lab Results   Component Value Date    BUN 64 (H) 11/24/2019    CR 6.21 (H) 11/24/2019     Lab Results   Component Value Date    MAG 1.6 11/24/2019     Lab Results   Component Value Date    PHOS 6.0 (H) 11/24/2019       Creatinine   Date Value Ref Range Status   11/24/2019 6.21 (H)  0.52 - 1.04 mg/dL Final   11/23/2019 5.86 (H) 0.52 - 1.04 mg/dL Final   11/22/2019 5.44 (H) 0.52 - 1.04 mg/dL Final   11/20/2019 4.76 (H) 0.52 - 1.04 mg/dL Final   11/15/2019 1.14 (H) 0.52 - 1.04 mg/dL Final   11/14/2019 1.24 (H) 0.52 - 1.04 mg/dL Final       Attestation:  I have reviewed today's vital signs, notes, medications, labs and imaging.     Kg Solorzano MD

## 2019-11-24 NOTE — PROVIDER NOTIFICATION
11/24/19 0900   Significant Event   Significant Event Other (see comments)  (Hgb 6.0)   Received call from lab with the above results. MD and RN notified.

## 2019-11-24 NOTE — CONSULTS
Gastroenterology Consultation      Radha Turcios MRN# [id number]   YOB: 1960 Age: 59 year old   Date of Admission: 11/22/2019     Reason for consult: I was asked by Dr May to evaluate this patient for cirrhosis and hepatorenal syndrome.               History of Present Illness:   This patient is a 59 year old female who presents with elevated creatinine.  She has alcoholic cirrhosis and is currently sober.  She has had ascites and encephalopathy.  She reports no change in chronic symptoms recently - edema, anemia, abdominal swelling.  She has not had fevers, CT, abdominal pain.  She is having increasing numbers of stools despite decreasing lactulose.  Urination is much less. She is admitted after an outpt creatinine was significantly changed.  In the hospital, her kidney function continues to deteriorate.  The decline may be multi-factorial, including hepatorenal syndrome.              Past Medical History:     Past Medical History:   Diagnosis Date     Abnormal pap     distant past  normal since then     Allergic rhinitis      Anxiety      Depression      Elevated blood protein     resolved  negative serum protein electrophoresis      GERD (gastroesophageal reflux disease)      Hyperlipemia      Insomnia      Macrocytosis without anaemia     unclear cause      Panic disorder with agoraphobia, severe agoraphobic avoidance and mild panic attacks     stable on viibryd/xanax- failed paxil/ zoloft/ wellbutrin/seroquel/effexor/klonopin     Recurrent cold sores      Tobacco abuse              Past Surgical History:     Past Surgical History:   Procedure Laterality Date     NO HISTORY OF SURGERY                 Social History:     Social History     Socioeconomic History     Marital status:      Spouse name: Not on file     Number of children: Not on file     Years of education: Not on file     Highest education level: Not on file   Occupational History     Not on file   Social Needs      Financial resource strain: Not on file     Food insecurity:     Worry: Not on file     Inability: Not on file     Transportation needs:     Medical: Not on file     Non-medical: Not on file   Tobacco Use     Smoking status: Current Every Day Smoker     Packs/day: 1.00     Years: 23.00     Pack years: 23.00     Types: Cigarettes     Smokeless tobacco: Never Used   Substance and Sexual Activity     Alcohol use: Yes     Alcohol/week: 0.0 standard drinks     Comment: 4-5 drinks per week     Drug use: No     Sexual activity: Never     Partners: Male   Lifestyle     Physical activity:     Days per week: Not on file     Minutes per session: Not on file     Stress: Not on file   Relationships     Social connections:     Talks on phone: Not on file     Gets together: Not on file     Attends Islam service: Not on file     Active member of club or organization: Not on file     Attends meetings of clubs or organizations: Not on file     Relationship status: Not on file     Intimate partner violence:     Fear of current or ex partner: Not on file     Emotionally abused: Not on file     Physically abused: Not on file     Forced sexual activity: Not on file   Other Topics Concern     Parent/sibling w/ CABG, MI or angioplasty before 65F 55M? No      Service Not Asked     Blood Transfusions Not Asked     Caffeine Concern Not Asked     Occupational Exposure Not Asked     Hobby Hazards Not Asked     Sleep Concern Not Asked     Stress Concern Not Asked     Weight Concern Not Asked     Special Diet Not Asked     Back Care Not Asked     Exercise Not Asked     Bike Helmet Not Asked     Seat Belt Not Asked     Self-Exams Not Asked   Social History Narrative    6 of 11 siblings have passed away    Brother  of MI age 51  had lung cancer    Brother MI     Sister  alcoholic Liver disease/Cirrhosis    Sister  age 48 lung cancer was a non smoker     Brother  Lung cancer  Smoker     Brother  stomach cancer          "Dad pancreatic cancer age 58     Mom lived to age 89             Family History:     Family History   Problem Relation Age of Onset     Lipids Mother      Heart Disease Mother      Alzheimer Disease Mother      Allergies Mother      C.A.D. Mother         had MI     Cancer Father 58        pancreatic or prostate?     Heart Disease Brother      C.A.D. Brother         2 brothers  of heart attacks/ one also had lung caner     Cancer Brother         2 brothers   of lung cancer     Cancer Sister         lung     Alcohol/Drug Sister          alcoholic cirrhosis     Depression Sister         anxiety disorder     Depression Brother         anxiety disorder     Colon Cancer Sister 51     Breast Cancer Sister              Allergies:      Allergies   Allergen Reactions     Amoxicillin      Rash             Medications:     No current outpatient medications on file.             Review of Systems:   10-point review of systems negative except as noted in HPI.            Physical Exam:   Vitals were reviewed  BP (!) 86/42 (BP Location: Left arm)   Pulse 98   Temp 97.6  F (36.4  C) (Oral)   Resp 16   Ht 1.626 m (5' 4\")   Wt 80.5 kg (177 lb 6.4 oz)   LMP 2001   SpO2 99%   BMI 30.45 kg/m    Constitutional:   Chronically ill appearing     Heent:   Ecchymoses around eyes and on face     Neck:   No adenopathy, thyroid not palpable   Respiratory:   CTA anteriorly     Cardiovascular:   RRR, no murmur     Gastrointestinal:   Distended, active BS, soft     Extremities:   No clubbing, cyanosis.  2+ edema   Neuro:   Grossly nonfocal, no asterixis     Skin:   No rashes             Data:     ROUTINE IP LABS  BMP  Last Comprehensive Metabolic Panel:  Sodium   Date Value Ref Range Status   2019 130 (L) 133 - 144 mmol/L Final     Potassium   Date Value Ref Range Status   2019 3.8 3.4 - 5.3 mmol/L Final     Chloride   Date Value Ref Range Status   2019 102 94 - 109 mmol/L Final     Carbon Dioxide   Date " Value Ref Range Status   11/24/2019 16 (L) 20 - 32 mmol/L Final     Anion Gap   Date Value Ref Range Status   11/24/2019 12 3 - 14 mmol/L Final     Glucose   Date Value Ref Range Status   11/24/2019 92 70 - 99 mg/dL Final     Urea Nitrogen   Date Value Ref Range Status   11/24/2019 64 (H) 7 - 30 mg/dL Final     Creatinine   Date Value Ref Range Status   11/24/2019 6.21 (H) 0.52 - 1.04 mg/dL Final     GFR Estimate   Date Value Ref Range Status   11/24/2019 7 (L) >60 mL/min/[1.73_m2] Final     Comment:     Non  GFR Calc  Starting 12/18/2018, serum creatinine based estimated GFR (eGFR) will be   calculated using the Chronic Kidney Disease Epidemiology Collaboration   (CKD-EPI) equation.       Calcium   Date Value Ref Range Status   11/24/2019 6.9 (L) 8.5 - 10.1 mg/dL Final       CBC  Lab Results   Component Value Date    WBC 7.1 11/24/2019     Lab Results   Component Value Date    RBC 1.92 11/24/2019     Lab Results   Component Value Date    HGB 6.0 11/24/2019     Lab Results   Component Value Date    HCT 18.4 11/24/2019     No components found for: MCT  Lab Results   Component Value Date    MCV 96 11/24/2019     Lab Results   Component Value Date    MCH 31.3 11/24/2019     Lab Results   Component Value Date    MCHC 32.6 11/24/2019     Lab Results   Component Value Date    RDW 17.2 11/24/2019     Lab Results   Component Value Date    PLT 51 11/24/2019       INR  Lab Results   Component Value Date    INR 1.54 11/23/2019       PANCREASNo lab results found in last 7 days.  LFT  Recent Labs   Lab 11/24/19  0627   PROTTOTAL 6.1*   ALBUMIN 2.4*   BILITOTAL 0.7   ALKPHOS 69   AST 37   ALT 19                Assessment and Plan:   The worsening renal function is very worrisome.  I would like to do a diagnostic paracentesis to make sure SBP is not contributing and similarly check a stool for C diff given recent hospitalization.  Would cut back on lactulose and taper to two loose stools daily.       Tanner Camacho,  MD  [facility]  Office:  [phone]

## 2019-11-24 NOTE — PROGRESS NOTES
"Alert and oriented x4. Asymptomatic hypotensive, mild dizziness when awake needing 1A at all times. General body 3+ swelling from abdomen to BLE. Denies SOB, Lungs clear, negative for cough. numbness to BLE. On midodrine for BP.     Frequent small loose to soft BMs tonight with associated incontinence. awaiting uncontaminated urine sample for sodium levels.   BP (!) 85/36 (BP Location: Right arm)   Pulse 98   Temp 97.8  F (36.6  C) (Oral)   Resp 18   Ht 1.626 m (5' 4\")   Wt 80.5 kg (177 lb 6.4 oz)   LMP 09/14/2001   SpO2 98%   BMI 30.45 kg/m      "

## 2019-11-24 NOTE — PROGRESS NOTES
[facility]    Medicine Progress Note - Hospitalist Service       Date of Admission:  11/22/2019  Date of Service: 11/24/2019    Assessment & Plan     This is a 59-year-old female with a past medical history including liver cirrhosis secondary to EtOH use, with associated complications including hepatic encephalopathy, thrombocytopenia, ascites and coagulopathy, alcohol abuse, recent hospitalization for acute kidney injury and ESBL UTI, who presented to the hospital with concerns for worsening renal function.    1.  Acute renal failure.  Patient is seen by nephrology in consultation.  Initial thought was due to frequent ibuprofen use, diuretics and recent Bactrim.  However, she has a low urine sodium and Consuelo.  She has not responded to IV fluids and I am strongly concerned about possibility of hepatorenal syndrome.  Discussed case with nephrology.  Patient is not an ideal candidate for dialysis given her comorbidities and advanced liver disease.  Patient is not sure herself if she would want dialysis.  She received a trial of albumin.   --Started on midodrine 10 mg 3 times daily  -Continue close monitoring of serum chemistries and daily BMP  - We will see if treatment of her anemia with transfusion helps with her renal function.  -IV fluid diuretic management per nephrology  -Ongoing discussion about goals of care     2.  Liver cirrhosis.  Secondary to alcohol use.  Patient reports that her last drink was on September 13.  Almost 2 months ago. Has thrombocytopenia and coagulopathy. Appears to be decompensating.   -GI consultation  --Afebrile without signs of infection, normal white count.  Repeat urinalysis given recent ESBL infection, also GI recommending paracentesis to rule out SBP and C. difficile PCR  -MELD-Na score of 28.  Had a amie discussion with the patient and her  about the gravity of her current illness and guarded prognosis especially in light of worsening renal  failure  --Palliative care consult  --Ongoing alcohol cessation    3.  Hepatic encephalopathy.  Reduced dose of lactulose given the diarrhea.  Currently on 10 g daily.    4.  Ascites.  Plan for paracentesis if her blood pressure improves and able to tolerate.  Will likely need a dose of albumin with that.  Monitor for signs of volume overload.    5.  Hyponatremia.  Sodium 130.  Due to the liver disease.    6.  Acute on chronic anemia.  Hemoglobin down to 6.0 today.  No obvious bleeding.  However, certainly at risk with her advanced liver disease, thrombocytopenia, coagulopathy.  1 unit of RBC transfusion.  Recheck later today.  Try to keep above 7.  GI consulted.  Monitor for any signs of melena    Diet: Combination Diet Renal Diet    DVT Prophylaxis: Ambulate every shift  Olson Catheter: not present  Code Status: Full Code      Disposition Plan   Expected discharge: TBD  Entered: Anson May MD 11/24/2019, 3:50 PM       The patient's care was discussed with the Bedside Nurse, Patient, Patient's Family and nephrology Consultant.    Guarded prognosis     Anson May MD  Hospitalist Service  [facility]    ______________________________________________________________________    Interval History   Chart reviewed and patient seen. Case discussed with nursing staff.     Patient feels ok, mentation is stable, feels like her abdomen is swelling up.  Denies any chest pain, shortness of breath. No reports of abdominal pain or vomiting. no reports of melena although stool was reported to be dark by staff. No new complaints voiced otherwise.     Data reviewed today: I reviewed all medications, new labs and imaging results over the last 24 hours. I personally reviewed    Physical Exam   Vital Signs: Temp: 98.1  F (36.7  C) Temp src: Oral BP: 94/48   Heart Rate: 89 Resp: 16 SpO2: 96 % O2 Device: None (Room air)    Weight: 177 lbs 6.4 oz    GENERAL:  Awake and alert, No acute distress. Frail appearing    PSYCH: Appropriate affect, no acute agitation   HEENT:  Neck is Supple, trachea is midline, EOMI, has subconjunctival hemorrhage on right eye, also appears to have bruising on her face  CARDIOVASCULAR: Regular rate and rhythm, Normal S1, S2, no loud murmurs, no rubs or gallops.   PULMONARY:  Clear to auscultation bilaterally.   GI: Abdomen is soft, non tender, moderately distended, bowel sounds present. No rebound or guarding   SKIN:  No cyanosis or clubbing, no obvious exanthems on exposed areas   MSK: Extremities are warm and well perfused. +2 pitting edema   Neuro: Awake and oriented, Moving all extremities with good strength     Data   Recent Labs   Lab 11/24/19  0627 11/23/19  0612 11/22/19  0948   WBC 7.1 8.4 7.0   HGB 6.0* 7.0* 7.2*   MCV 96 95 97   PLT 51* 53* 63*   INR  --  1.54* 1.47*   * 129* 129*   POTASSIUM 3.8 4.9 4.6   CHLORIDE 102 103 103   CO2 16* 15* 13*   BUN 64* 62* 55*   CR 6.21* 5.86* 5.44*   ANIONGAP 12 11 13   KARLEY 6.9* 7.2* 7.1*   GLC 92 66* 81   ALBUMIN 2.4* 2.3* 2.1*   PROTTOTAL 6.1* 6.4* 6.5*   BILITOTAL 0.7 0.8 0.7   ALKPHOS 69 79 100   ALT 19 19 19   AST 37 39 51*       No results found for this or any previous visit (from the past 24 hour(s)).  Medications       calcium carbonate-vitamin D  1 tablet Oral TID w/meals     furosemide  40 mg Oral BID     gabapentin  100 mg Oral Daily     lactulose  10 g Oral Daily     lidocaine   Transdermal Q8H     lidocaine   Transdermal Q24H     magnesium oxide  400 mg Oral Daily     melatonin  5 mg Oral At Bedtime     midodrine  10 mg Oral TID w/meals     multivitamin, therapeutic  1 tablet Oral Daily     omeprazole  20 mg Oral Daily     sodium chloride (PF)  3 mL Intracatheter Q8H     traZODone  75 mg Oral At Bedtime     vilazodone  40 mg Oral Daily     vitamin B1  100 mg Oral Daily     vitamin D3  2,000 Units Oral Daily

## 2019-11-24 NOTE — PLAN OF CARE
A&O x 4. Up with SBA. C/O generalized pain- denied need for intervention. IV SL. Decreased appetite. Scattered bruising  1 unit of PRBC and recheck hemoglobin 2 hours after per MD (scheduled for 1930)  Need urine- trying to get clean sample. Contaminated each time due to loose stools  Stool sample sent  Plan for diagnostic paracentesis tomorrow  GI/Nephrology following Pain and palliative consulted

## 2019-11-25 NOTE — PLAN OF CARE
VS, tele- SR, Last night had 9 beat run V-Tach MD aware. Bps soft, pt getting 10 mg of midodrine, pt had a diagnostic paratenesis today,GI following, no complaints of pain, continued edema in legs, knees, ankles, and feet, pt on contact isolation for esbl in urine, renal diet, tolerating foods well, hgb 7.4. platelets 47, PIV saline locked, SBA, magic mouthwash and nystatin for mouth pain, palliative and nephrology following creatinine 6.27,see note, unable to obtain urine for labs due to stool/urine mix, pt feels safe at home and enjoys husbands company in hospital room.

## 2019-11-25 NOTE — PROGRESS NOTES
"[facility]  [facility]  Gastroenterology Progress Note    Interval History:    Patient reports abdominal distention/ascites but no pain. No nausea or vomiting. She underwent diagnostic paracentesis this morning; results are pending.      Physical Exam:      /48   Pulse 98   Temp 96.8  F (36  C) (Oral)   Resp 18   Ht 1.626 m (5' 4\")   Wt 81.1 kg (178 lb 11.2 oz)   LMP 2001   SpO2 98%   BMI 30.67 kg/m    Temp (24hrs), Av.6  F (36.4  C), Min:96.8  F (36  C), Max:98.7  F (37.1  C)    Patient Vitals for the past 72 hrs:   Weight   19 0438 81.1 kg (178 lb 11.2 oz)   19 0256 80.5 kg (177 lb 6.4 oz)   19 0549 78.8 kg (173 lb 12.8 oz)   19 1300 79.1 kg (174 lb 4.8 oz)       Intake/Output Summary (Last 24 hours) at 2019 0916  Last data filed at 2019 0911  Gross per 24 hour   Intake 1005.42 ml   Output --   Net 1005.42 ml         Constitutional: Chronically ill appearing.  Cardiovascular: RRR, normal S1/S2, no murmurs.  Respiratory: Effort normal, CTAB.  Abdomen: Distended/ascites, nontender.  Skin: Marked periorbital & perioral ecchymosis. No jaundice.    Additional Comments:  ROS, FH, SH: See initial GI consult for details.      Laboratory Data:  Recent Labs   Lab Test 19  0757 19  1955 19  0627 19  0612 19  0948  19  0748   WBC 6.2  --  7.1 8.4 7.0   < > 5.9   HGB 7.4* 7.2* 6.0* 7.0* 7.2*   < > 8.2*   MCV 95  --  96 95 97   < > 102*   PLT PENDING  --  51* 53* 63*   < > 109*   INR  --   --   --  1.54* 1.47*  --  1.71*    < > = values in this interval not displayed.     Recent Labs   Lab Test 19    130* 129*   POTASSIUM 3.7 3.8 4.9   CHLORIDE 103 102 103   CO2 16* 16* 15*   BUN 68* 64* 62*   CR 6.27* 6.21* 5.86*   ANIONGAP 14 12 11   KARLEY 7.4* 6.9* 7.2*     Recent Labs   Lab Test 19  0254  10/15/19  0757 " 10/14/19  1042  10/07/19  2220  10/06/19  2053   ALBUMIN 2.4* 2.4* 2.3*   < >  --    < > 2.0* 2.0*   < >  --    < >  --    BILITOTAL 0.8 0.7 0.8   < >  --    < > 1.5* 1.7*   < >  --    < >  --    DBIL 0.4*  --   --   --   --   --  0.8* 0.9*   < >  --    < >  --    ALT 21 19 19   < >  --    < > 36 35   < >  --    < >  --    AST 42 37 39   < >  --    < > 63* 59*   < >  --    < >  --    ALKPHOS 76 69 79   < >  --    < > 115 121   < >  --    < >  --    PROTEIN  --   --   --   --  20*  --   --   --   --  30*  --  100*    < > = values in this interval not displayed.         Assessment & Plan:  Radha Turcios is a 59-year-old female with alcoholic cirrhosis who was admitted after being called by her PCP with an abnormal creatinine level.    1) Alcoholic cirrhosis       - MELD 29 (11/23).       - Complicated by hepatic encephalopathy, ascites, thrombocytopenia, coagulopathy.       - Presence of varices unknown (no prior EGD).       - Diagnostic paracentesis performed this morning, cell count pending (r/o SBP).    2) Diarrhea       - May be secondary to lactulose, although diarrhea has persisted even after decreasing dose.      - C.diff study was negative.      - Taper lactulose dosing to 2 stools daily.    3) Acute renal failure      - Initially thought to be related to medications (Bactrim, NSAIDs, diuretics) but with low FeNa there is also suspicion for hepatorenal syndrome.      - Creatinine continues to rise.      - Nephrology is following.      Roxie Perkins PA-C  [facility] ([facility])

## 2019-11-25 NOTE — PROGRESS NOTES
[facility]  Hospitalist Progress Note  Nicanor Ward MD 11/25/19    Reason for Stay (Diagnosis): Renal failure, hepatorenal syndrome         Assessment and Plan:      Summary of Stay: Radha Turcios is a 59 year old female with a past medical history including liver cirrhosis secondary to EtOH use, with associated complications including hepatic encephalopathy, thrombocytopenia, anemia, ascites and coagulopathy and recent hospitalization for acute kidney injury and ESBL UTI, who presented to the hospital with concerns for worsening renal function.  She was admitted on 11/22/2019 with acute kidney injury that has progressively worsened despite IV fluids and albumin.  Nephrology was consulted.  Concern for hepatorenal syndrome and started on oral diuretics that did not seem helpful.  Has diffuse anasarca and creatinine now about 6, prognosis very guarded and palliative care has been consulted.  Not a good dialysis candidate with her hypotension and current state of liver disease.  GI was consulted and recommended diagnostic paracentesis which was done today that does not show sign of SBP.  Required transfusion RBCs for hemoglobin less than 7.  Has been started on midodrine for hypotension.     FIGUEROA, likely hepatorenal syndrome:  Patient is seen by nephrology in consultation.  Initial thought was due to frequent ibuprofen use, diuretics and recent Bactrim.  However, she has a low urine sodium and FENA.  She has not responded to IV fluids and I am strongly concerned about possibility of hepatorenal syndrome.    Unfortunately did not respond to IV albumin either.  Discussed case with nephrology.  Patient is not an ideal candidate for dialysis given her hypotension, comorbidities, and advanced liver disease.  Patient is not sure herself if she would want dialysis.    -Nephrology consulted  -Received Lasix 40 mg p.o. twice daily on 11/24, creatinine not improved although difficult to say how much diuresis  she actually had with her loose stools and inability to track urine output  -Palliative care consult for goals of care    Hypotension: Suspect secondary to her cirrhosis.  No evidence for infection at this time.  No sign of SBP on paracentesis.  -Started on midodrine 10 mg 3 times daily     Cirrhosis: Secondary to alcohol use.  Patient reports that her last drink was on September 13.  Almost 2 months ago. Has thrombocytopenia and coagulopathy. Appears to be decompensating.   -GI consulted  -Diagnostic paracentesis 11/24 with only 100 WBCs and 8% neutrophils not consistent with SBP  -MELD score of 25 today      Hepatic encephalopathy: Reduced dose of lactulose given the diarrhea.  Currently on 10 g daily.     Hyponatremia: Sodium 130.  Due to the liver disease.     Acute on chronic anemia, thrombocytopenia:  Hemoglobin down to 6.0 on 11/24 and received 1 unit RBC transfusion.  No obvious bleeding.  However, certainly at risk with her advanced liver disease, thrombocytopenia, coagulopathy.  Platelet count stable at around 50 K.  -CBC tomorrow    Thrush: Endorsing pain and sores in her mouth.  Does appear to have some plaques in the oropharynx.  -Start nystatin swish and swallow 4 times daily  -Magic mouthwash as needed for pain     DVT Prophylaxis: Ambulate every shift  Code Status: Full Code.  There have been discussions with the patient and her  from the previous hospitalist and myself about the gravity of her current illness and guarded prognosis especially in light of worsening renal failure.  Palliative care consult to define goals of care given worsening renal function and cirrhosisFEN: renal diet  Discharge Dispo: tbd.    Estimated Disch Date / # of Days until Disch: TBD pending renal function        Interval History (Subjective):      Assumed care today.  Patient known to me from recent admission.  Loose stools improving.  Unable to track urine output secondary to stooling, but she does not feel that  "she is urinating a lot.  Mental status overall okay.  Renal function not improving.  Does have some sores in her mouth looks like thrush.  Updated spouse at bedside with patient's permission.                  Physical Exam:      Last Vital Signs:  BP 91/47 (BP Location: Right arm)   Pulse 98   Temp 96.9  F (36.1  C) (Oral)   Resp 16   Ht 1.626 m (5' 4\")   Wt 81.1 kg (178 lb 11.2 oz)   LMP 09/14/2001   SpO2 99%   BMI 30.67 kg/m        Intake/Output Summary (Last 24 hours) at 11/25/2019 1432  Last data filed at 11/25/2019 1023  Gross per 24 hour   Intake 1008.42 ml   Output --   Net 1008.42 ml       Constitutional: Awake, NAD   Eyes: Right eye sclera red  HEENT: White plaques and oropharynx consistent with thrush  Respiratory: no respiratory distress, lungs cta bilaterally, no crackles or wheeze  Cardiovascular: RRR.  No murmur   GI: Moderately distended, nontender to palpation, bowel sounds present  Skin:  Ecchymoses to face  Musculoskeletal/extremities: 3+ bilateral lower extremity pitting edema  Neurologic: Somnolent later in the day, answering questions, no asterixis  Psychiatric: Tearful when discussing prognosis         Medications:      All current medications were reviewed with changes reflected in problem list.         Data:      All new lab and imaging data was reviewed.   Labs:  Recent Labs   Lab 11/25/19 0757 11/24/19 1955 11/24/19 0627 11/23/19 0612   WBC 6.2  --  7.1 8.4   HGB 7.4* 7.2* 6.0* 7.0*   HCT 21.5*  --  18.4* 20.2*   MCV 95  --  96 95   PLT 47*  --  51* 53*     Recent Labs   Lab 11/25/19 0757 11/24/19 0627 11/23/19 0612 11/20/19  1253    130* 129*   < > 133   POTASSIUM 3.7 3.8 4.9   < > 5.1   CHLORIDE 103 102 103   < > 106   CO2 16* 16* 15*   < > 15*   ANIONGAP 14 12 11   < > 12   GLC 77 92 66*   < > 71   BUN 68* 64* 62*   < > 48*   CR 6.27* 6.21* 5.86*   < > 4.76*   GFRESTIMATED 7* 7* 7*   < > 9*   GFRESTBLACK 8* 8* 8*   < > 11*   KARLEY 7.4* 6.9* 7.2*   < > 8.1*   MAG 1.5* " 1.6  --   --  1.4*   PHOS 6.8* 6.0*  --   --   --    PROTTOTAL 6.2* 6.1* 6.4*   < > 6.8   ALBUMIN 2.4* 2.4* 2.3*   < > 2.3*   BILITOTAL 0.8 0.7 0.8   < > 0.7   ALKPHOS 76 69 79   < > 115   AST 42 37 39   < > 39   ALT 21 19 19   < > 22    < > = values in this interval not displayed.      Peritoneal fluid:  111 WBCs, 8% neutrophils    Imaging:   None today      Nicanor Ward MD

## 2019-11-25 NOTE — PROGRESS NOTES
Renal Medicine Progress Note                                Rdaha Turcios MRN# [id number]   Age: 59 year old YOB: 1960   Date of Admission: 11/22/2019 Hospital LOS: 3                  Assessment/Plan:     Admitted in setting of ARF    1.  Near baseline normal GFR   -creatinine 0.70 range  2.  Previous episodes of ARF  3.  Proteinuria    -  4.  ARF   -renal US non diagnostic   -non oliguric    -UO incomplete   -NSAID + Bactrim + spironolactone   5.  Alcoholic liver disease    -portal HTN  6.  Metabolic acidosis           Stop diuretic  Follow labs    Need to establish goals of care    Extremely poor dialysis candidate  (extreme debility)    No dialysis indication today        Interval History:     Follows  Unclear if completely with it    Previously suggested no dialysis  Today would do dialysis    ROS:     GENERAL: NAD, No fever,chills  R: NEGATIVE for significant cough or SOB  CV: NEGATIVE for chest pain, palpitations  EXT: no change edema  ROS otherwise negative    Medications and Allergies:     Reviewed    Physical Exam:     Vitals were reviewed  Patient Vitals for the past 8 hrs:   BP Temp Temp src Heart Rate Resp SpO2   11/25/19 1151 91/47 -- -- -- -- --   11/25/19 1150 (!) 85/45 96.9  F (36.1  C) Oral 89 16 99 %   11/25/19 1018 98/55 96.8  F (36  C) Oral 97 16 98 %   11/25/19 1009 97/47 -- -- 97 18 100 %   11/25/19 0951 100/54 -- -- 97 18 100 %   11/25/19 0912 103/48 -- -- -- -- --   11/25/19 0910 (!) 86/44 -- -- -- -- --   11/25/19 0907 -- -- -- 90 -- --   11/25/19 0801 105/51 96.8  F (36  C) Oral 89 18 98 %     I/O last 3 completed shifts:  In: 1005.42 [P.O.:680]  Out: -     Vitals:    11/22/19 0902 11/22/19 1300 11/23/19 0549 11/24/19 0256   Weight: 78.7 kg (173 lb 8 oz) 79.1 kg (174 lb 4.8 oz) 78.8 kg (173 lb 12.8 oz) 80.5 kg (177 lb 6.4 oz)    11/25/19 0438   Weight: 81.1 kg (178 lb 11.2 oz)       GENERAL: awake, alert, follows  HEENT: NC/AT, PERRLA, EOMI, non icteric, pharynx  moist without lesion  RESP:  clear anteriorly  CV: RRR, normal S1 S2  ABDOMEN: soft, nontender, no HSM or masses and bowel sounds normal  MS: no clubbing, cyanosis   SKIN: clear without significant rashes or lesions  NEURO: speech normal and cranial nerves 2-12 intact  PSYCH: affect flat  EXT: 2 plus edema    Data:     Recent Labs   Lab 11/25/19  0757 11/24/19  0627 11/23/19  0612 11/22/19  0948    130* 129* 129*   POTASSIUM 3.7 3.8 4.9 4.6   CHLORIDE 103 102 103 103   CO2 16* 16* 15* 13*   ANIONGAP 14 12 11 13   GLC 77 92 66* 81   BUN 68* 64* 62* 55*   CR 6.27* 6.21* 5.86* 5.44*   GFRESTIMATED 7* 7* 7* 8*   GFRESTBLACK 8* 8* 8* 9*   KARLEY 7.4* 6.9* 7.2* 7.1*         Recent Labs   Lab 11/25/19  0757 11/24/19  0627 11/23/19  0612 11/22/19  0948 11/20/19  1253   CR 6.27* 6.21* 5.86* 5.44* 4.76*     Recent Labs   Lab 11/25/19  0757 11/24/19  0627 11/23/19  0612 11/22/19  0948   ALBUMIN 2.4* 2.4* 2.3* 2.1*         G Paul Figueroa MD    [facility] - Nephrology  [phone]

## 2019-11-25 NOTE — PROGRESS NOTES
CM: received PC from [facility] for CADI screening. SW had left this request back on 11/15, Per Chas [phone] are booked out till January for screening. BHAVIN deferred Chas to call pt's spouse Lexie

## 2019-11-25 NOTE — PROCEDURES
[facility]    Procedure: IR Procedure Note  Date/Time: 11/25/2019 10:05 AM  Performed by: Maria C Juárez DO  Authorized by: Maria C Juárez DO   IR Fellow Physician:  Radiology Resident Physician: Dr. Maria C Juárez      UNIVERSAL PROTOCOL   Site Marked: Yes  Prior Images Obtained and Reviewed:  Yes  Required items: Required blood products, implants, devices and special equipment available    Patient identity confirmed:  Verbally with patient and arm band  Patient was reevaluated immediately before administering moderate or deep sedation or anesthesia  Confirmation Checklist:  Patient's identity using two indicators, relevant allergies, procedure was appropriate and matched the consent or emergent situation and correct equipment/implants were available  Time out: Immediately prior to the procedure a time out was called    Preparation: Patient was prepped and draped in usual sterile fashion       ANESTHESIA    Anesthesia: Local infiltration  Local Anesthetic:  Lidocaine 1% without epinephrine      SEDATION    Patient Sedated: No    See dictated procedure note for full details.  Findings: Ascites.     Specimens: fluid and/or tissue for laboratory analysis and culture    Complications: None    Condition: Stable    Plan: Orders placed.     PROCEDURE   Patient Tolerance:  Patient tolerated the procedure well with no immediate complications  Describe Procedure: Diagnostic paracentesis. 50 mL fluid removed.   Length of time physician/provider present for 1:1 monitoring during sedation: 0

## 2019-11-25 NOTE — PROGRESS NOTES
Paracentesis complete.  Consent obtained with Dr. Juárez.  Pt tolerated well, drained 50 mLs clear yellow colored fluid for diagnostic only per order.  Clarified order with RN and confirmed with MD for diagnostic. Site at RLQ.  Site covered with glue and a bandaid.  Site CDI.  Albumin not given to be determined by hospitalist.  Pt transferred back to Room 350 via cart.  Report to be called to floor RN.

## 2019-11-25 NOTE — PLAN OF CARE
"VS /45 (BP Location: Left arm)   Pulse 98   Temp 97.9  F (36.6  C) (Oral)   Resp 20   Ht 1.626 m (5' 4\")   Wt 80.5 kg (177 lb 6.4 oz)   LMP 09/14/2001   SpO2 94%   BMI 30.45 kg/m    Orientation A&Ox4   Lung sounds Clear.CHACON.   O2 RA   Tele SR/ST - 9 beat run of Vtach   Bowel sounds WNL. Abd distended.   Tolerating Renal diet  IVF PIV SL   Dressings none   Skin Bruising throughout body. +2/3 edema to BLE.     CMS Intact   Drains none   Activity up with 1 assist  Pain Generalized pain. Voltaren gel available   Discharge plan TBD. GI/Nephro/Pain&palliate following. Continue supportive cares and continue to monitor.     "

## 2019-11-25 NOTE — CONSULTS
[facility]    Palliative Care Consultation   Text Page    Date of Admission:  11/22/2019    Assessment & Plan   Radha Turcios is a 59 year old female who was admitted on 11/22/2019. I was asked to see the patient for goals and possible hospice enrollment.    Recommendations:   Please see assessments below for rationale.  1.Decisional Capacity -  Intact. Patient does not have an advance directive. Per  informed consent policy next of kin should be involved if patient becomes unable.  2. Pain- low back pain, chronic.  Does not use opioids or regularly dosed analgesia.  - voltaren gel four times daily to lower back and below shoulder blades.  - tylenol prn for mild pain  3. Spiritual Care- Oriented to Spiritual Health as part of Palliative Care team. Spiritual Health Services declined at this time.  4. Care Planning- Appreciate Care of Nicole Mulligan.    - Patient wishes to discharge home on hospice.  Stated she would like Hospice meeting tomorrow morning and to discharge Wednesday.  Stated she does not want chest compressions or intubation for saving her life, code status changed in chart.    When asked, she denied feeling unsafe at home and states that her  and friends and family will provide her care when she needs it.    Goal of care:  DNR/DNI, comfort focused.    Findings & plan of care discussed with: Bedside Nurse Agata and Hospitalist Dr. Castro  Thank you for involving us in the patient's care.     Janna MARTÍNEZ, CNP  Pain Management and Palliative Care  [facility]  Pgr: [phone]    Time Spent on this Encounter   Total unit/floor time 60 minutes, time consisted of the following, examination of the patient, reviewing the record and completing documentation. >50% of time spent in counseling and coordination of care.  Time spend counseling with patient and family consisted of the following topics, goals of care, education about prognosis, care planning for  discharge and symptom management.  Time spent in coordination of care with team members as listed above.       Palliative Care Assessment:  Radha Turcios is a 59 year old patient with history of anxiety, chronic back pain, GERD, cirrhosis, anasarca, anemia, recurrent ascites, chronic kidney disease, hepatic encephalopathy admitted at the advise of her primary care physician after serum creatinine returned abnormal. She has been treated for acute kidney injury of multifactorial etiology, labs have not improved despite treatments since admission.  Nephrology consult done and patient is not a favorable hemodialysis candidate given her comorbid state.  Concern for hepatorenal syndrome.    This is in the setting of Chronic hepatic disease and recurrent hepatic encephalopathy episodes.  she has been hospitalized three times in the past 12 months for recurrent confusion episodes and complications of hepatic disease. There is not reported or documented decline in patient's function, though patient has had episodes of confusion which has impacted her activities of daily living fairly consistently.  There is no reported or documented weight loss.     Symptoms:   Pain chronic low back pain  Nausea without vomiting - chronic, mild.  Diarrhea - secondary to lactulose, patient states it is worse.  Anorexia - decreased appetite, chronic, progressive.  Fatigue - progressive, states she appreciates assistance with ADLs as she gets tired from them.  Anxiety - baseline, has implemented behavioral and medication treatments.    Social:         Living situation: apartment with spouse       Support system: spouse, sister       Actual/Potential Caregiver: spouse       Functional status: minimal assist with ADLs at baseline    Mental Health:   Baseline anxiety, states it is under control at time of assessment.     Coping:   Patient unable to elaborate on how she is feeling about the diagnosis of hepatorenal syndrome or decline in health.   Feels that she is 'taking it all in'.  No additional information provided by spouse at the bedside.    Spiritual/Rastafari:    Spiritual background: unknown  Spiritual needs: unknown  Sense/Meaning Making: unknown    Prognostic Information:   Patient has grave prognosis given new kidney dysfunction in addition to hepatic disease.  She is not an ideal candidate for hemodialysis and per patient she would not want this invasive treatment. This has been discussed with primary team, nurse and patient.    Advance Care Planning:        Goals of Care: comfort focused       Health care directive: none on file.      History of Present Illness   History is obtained from the patient and electronic health record    Radha Turcios is a 59 year old patient with history of anxiety, chronic back pain, GERD, cirrhosis, anasarca, anemia, recurrent ascites, chronic kidney disease, hepatic encephalopathy admitted at the advise of her primary care physician after serum creatinine returned abnormal. She has been treated for acute kidney injury of multifactorial etiology, labs have not improved despite treatments since admission.  Nephrology consult done and patient is not a favorable hemodialysis candidate given her comorbid state.  Concern for hepatorenal syndrome.    Patient has stated that she does not wish to go forward with further tests or aggressive treatments, including dialysis.  She stated her goals are to go home and be comfortable.  She denies significant symptoms at this time, low back pain tolerable, baseline ache.  Denies shortness of breath.  Denies significant anxiety and states she has thought of her goals of care and is fairly certain.  Agreed to hospice meeting for tomorrow.      Decision-Making & Goals of Care Discussion:  Discussed on November 25, 2019 with Janna MARTÍNEZ, CNP:   Met with patient and her spouse at the bedside.  Reviewed palliative care consult and goals for discussion.  Patient stated she  knows 'its bad' and that her goal is to go home and be comfortable until she dies.  States that she does not want aggressive treatments or to return to the hospital any more.   I inquired if I could talk about what hospice enrollment would look like, she asked for clarification, I indicated that her prognosis or expected recovery is poor and that she could have hospice services if she chose, she was open to the idea of support for her cares at home, asked for a meeting with  hospice.   Spouse at bedside denied questions and did not participate in the conversation.       Past Medical History   I have reviewed this patient's medical history and updated it with pertinent information if needed.   Past Medical History:   Diagnosis Date     Abnormal pap     distant past  normal since then     Allergic rhinitis      Anxiety      Depression      Elevated blood protein     resolved  negative serum protein electrophoresis      GERD (gastroesophageal reflux disease)      Hyperlipemia      Insomnia      Macrocytosis without anaemia     unclear cause      Panic disorder with agoraphobia, severe agoraphobic avoidance and mild panic attacks     stable on viibryd/xanax- failed paxil/ zoloft/ wellbutrin/seroquel/effexor/klonopin     Recurrent cold sores      Tobacco abuse        Past Surgical History   I have reviewed this patient's surgical history and updated it with pertinent information if needed.  Past Surgical History:   Procedure Laterality Date     NO HISTORY OF SURGERY         Prior to Admission Medications   Prior to Admission Medications   Prescriptions Last Dose Informant Patient Reported? Taking?   Cyanocobalamin (B-12) 1000 MCG TBCR 11/21/2019 at am  No Yes   Sig: Take 1,000 mcg by mouth daily   Lidocaine (LIDOCARE) 4 % Patch prn  No Yes   Sig: Place 1 patch onto the skin every 24 hours To prevent lidocaine toxicity, patient should be patch free for 12 hrs daily.   diclofenac (VOLTAREN) 1 % topical gel prn  Yes Yes    Sig: Place 2 g onto the skin 3 times daily as needed for moderate pain   fish oil-omega-3 fatty acids 1000 MG capsule 11/21/2019 at am  Yes Yes   Sig: Take 2 g by mouth daily   folic acid (FOLVITE) 1 MG tablet 11/21/2019 at am  No Yes   Sig: Take 1 tablet (1 mg) by mouth daily   furosemide (LASIX) 20 MG tablet 11/21/2019 at pm  No Yes   Sig: Take 1 tablet (20 mg) by mouth 2 times daily   gabapentin (NEURONTIN) 100 MG capsule 11/21/2019 at pm  No Yes   Sig: Take 1 capsule (100 mg) by mouth 2 times daily   lactulose (CHRONULAC) 10 GM/15ML solution 11/21/2019 at    Yes Yes   Sig: Take 10 g by mouth daily    melatonin 5 MG tablet 11/21/2019 at pm  Yes Yes   Sig: Take 5 mg by mouth At Bedtime   menthol, Topical Analgesic, 2.5% (BENGAY VANISHIN SCENT) 2.5 % GEL topical gel prn  No Yes   Sig: Apply topically every 6 hours as needed for moderate pain   multivitamin, therapeutic (THERA-VIT) TABS tablet 11/21/2019 at    No Yes   Sig: Take 1 tablet by mouth daily   omeprazole (PRILOSEC) 20 MG DR capsule 11/21/2019 at    No Yes   Sig: Take 1 capsule (20 mg) by mouth daily   ondansetron (ZOFRAN-ODT) 4 MG ODT tab Did not start yet  No No   Sig: Take 1 tablet (4 mg) by mouth every 6 hours as needed for nausea or vomiting   order for DME   No No   Sig: Equipment being ordered: Walker Wheels () and Walker ()  Treatment Diagnosis: hepatic encephalopathy, UTI and impaired gait/balance   spironolactone (ALDACTONE) 25 MG tablet 11/21/2019 at pm  No Yes   Sig: Take 1 tablet (25 mg) by mouth 2 times daily   sulfamethoxazole-trimethoprim (BACTRIM DS/SEPTRA DS) 800-160 MG tablet 11/21/2019 at pm  No Yes   Sig: Take 1 tablet by mouth 2 times daily for 7 days   traZODone (DESYREL) 50 MG tablet 11/21/2019 at pm  Yes Yes   Sig: Take 75 mg by mouth At Bedtime   vilazodone (VIIBRYD) 40 MG TABS tablet 11/21/2019 at am  No Yes   Sig: Take 1 tablet (40 mg) by mouth daily   vitamin B1 (THIAMINE) 100 MG tablet 11/21/2019 at am  No Yes    Sig: Take 1 tablet (100 mg) by mouth daily   vitamin D3 (CHOLECALCIFEROL) 2000 units (50 mcg) tablet 2019 at    No Yes   Sig: Take 1 tablet (2,000 Units) by mouth daily      Facility-Administered Medications: None     Allergies   Allergies   Allergen Reactions     Amoxicillin      Rash       Social History   I have updated and reviewed the following Social History Narrative:   Social History     Social History Narrative    6 of 11 siblings have passed away    Brother  of MI age 51  had lung cancer    Brother MI     Sister  alcoholic Liver disease/Cirrhosis    Sister  age 48 lung cancer was a non smoker     Brother  Lung cancer  Smoker     Brother  stomach cancer         Dad pancreatic cancer age 58     Mom lived to age 89       Family History   I have reviewed this patient's family history and updated it with pertinent information if needed.   Family History   Problem Relation Age of Onset     Lipids Mother      Heart Disease Mother      Alzheimer Disease Mother      Allergies Mother      C.A.D. Mother         had MI     Cancer Father 58        pancreatic or prostate?     Heart Disease Brother      C.A.D. Brother         2 brothers  of heart attacks/ one also had lung caner     Cancer Brother         2 brothers   of lung cancer     Cancer Sister         lung     Alcohol/Drug Sister          alcoholic cirrhosis     Depression Sister         anxiety disorder     Depression Brother         anxiety disorder     Colon Cancer Sister 51     Breast Cancer Sister        Review of Systems   The 10 point Review of Systems is negative other than noted in the HPI or here.     Palliative Symptom Review (0=no symptom/no concern, 1=mild, 2=moderate, 3=severe):      Pain: 2-moderate      Fatigue: 1-mild      Nausea: 1-mild      Constipation: 0-none      Diarrhea: 2-moderate      Depressive Symptoms: 0-none      Anxiety: 0-none      Drowsiness: 0-none      Poor Appetite: 2-moderate       Shortness of Breath: 0-none      Insomnia: 0-none    Physical Exam   Temp:  [96.8  F (36  C)-98.7  F (37.1  C)] 96.9  F (36.1  C)  Heart Rate:  [89-97] 89  Resp:  [16-20] 16  BP: ()/(39-55) 86/39  SpO2:  [94 %-100 %] 99 %  178 lbs 11.2 oz  GEN:  Alert, oriented x 3, appears comfortable, NAD.  HEENT:  Normocephalic/atraumatic, no scleral icterus, no nasal discharge, mouth moist.  CV:  RRR, S1, S2; no murmurs or other irregularities noted.  +3 DP/PT pulses bilatererally; no edema BLE.  RESP:  Clear to auscultation bilaterally without rales/rhonchi/wheezing/retractions.  Symmetric chest rise on inhalation noted.  Normal respiratory effort.  ABD:  Rounded, soft, non-tender/non-distended.  +BS  EXT:  Edema & pulses as noted above.  CMS intact x 4.     M/S:   No tenderness to palpation.    SKIN:  Dry to touch, no exanthems noted in the visualized areas.    NEURO: Symmetric strength +5/5.  Sensation to touch intact all extremities.   There is no area of allodynia or hyperesthesia.  Psych:  Normal affect.  Calm, cooperative, conversant appropriately.     Delirium Screen/CAM:  Delirium = (#1 and #2 = YES) + (#3 and/or #4)   1) Acute onset and fluctuating course:   No   (acute change in mental status from baseline over last 24 hours)  2) Inattention:   No   (difficulty focusing, distractible, can't follow conversation)  3) Disorganized thinking:   No   (score only if #1 and #2 are YES)  (rambling/irrelevant conversation, unclear/illogical thoughts, inconsistency)  4) Altered level of consciousness:   No   (score only if #1 and #2 are YES)  (other than alert, calm, cooperative)    Delirium/CAM score: 0/4  Interpretation:  1)  Delirium:  Absent    Data   Most Recent 3 CBC's:  Recent Labs   Lab Test 11/25/19  0757 11/24/19  1955 11/24/19  0627 11/23/19  0612   WBC 6.2  --  7.1 8.4   HGB 7.4* 7.2* 6.0* 7.0*   MCV 95  --  96 95   PLT 47*  --  51* 53*     Most Recent 3 BMP's:  Recent Labs   Lab Test 11/25/19  0756  11/24/19 0627 11/23/19 0612    130* 129*   POTASSIUM 3.7 3.8 4.9   CHLORIDE 103 102 103   CO2 16* 16* 15*   BUN 68* 64* 62*   CR 6.27* 6.21* 5.86*   ANIONGAP 14 12 11   KARLEY 7.4* 6.9* 7.2*   GLC 77 92 66*     Most Recent 2 LFT's:  Recent Labs   Lab Test 11/25/19  0757 11/24/19 0627   AST 42 37   ALT 21 19   ALKPHOS 76 69   BILITOTAL 0.8 0.7

## 2019-11-25 NOTE — PROGRESS NOTES
Cross Cover    Notified that patient had a 9 beat run of VT.  History of cirrhosis 2/2 ETOH, here with worsening renal function.  Will check magnesium and phosphorus.  Do not see TTE in our system, defer to AM rounder whether this is needed.    Jordana Dennison MD

## 2019-11-26 NOTE — PLAN OF CARE
VSS; tele displaying SR; s/p diagnostic paracentesis on day shift-bandaid clean dry and intact; up to BR with SBA; unable to collect UA as misses hat and when urine in hat, stool accompanying it; alert and oriented x4 but forgetful; c/o pain bilateral shoulder blades and lower back rating 7/10, little relief with Voltaren and Hydroxyzine, states lidocaine patches do not work, not currently using; plan for hospice in the home

## 2019-11-26 NOTE — PROGRESS NOTES
Renal Medicine Progress Note                                Radha Turcios MRN# [id number]   Age: 59 year old YOB: 1960   Date of Admission: 11/22/2019 Hospital LOS: 4                  Assessment/Plan:     Admitted in setting of ARF    1.  Near baseline normal GFR   -creatinine 0.70 range  2.  Previous episodes of ARF  3.  Proteinuria    -  4.  ARF   -renal US non diagnostic   -non oliguric    -UO incomplete   -NSAID + Bactrim + spironolactone   5.  Alcoholic liver disease    -portal HTN  6.  Metabolic acidosis      Note transition to comfort care  Appropriate given current medical problems    Prognosis guarded  Life expectancy measured in weeks    Call with questions       Interval History:     Reviewed with palliative care team      ROS:     GENERAL: NAD, No fever,chills  R: NEGATIVE for significant cough or SOB  CV: NEGATIVE for chest pain, palpitations  EXT: no change edema  ROS otherwise negative    Medications and Allergies:     Reviewed    Physical Exam:     Vitals were reviewed  Patient Vitals for the past 8 hrs:   BP Temp Temp src Heart Rate Resp SpO2 Weight   11/26/19 0700 100/45 97.4  F (36.3  C) Oral 94 18 99 % --   11/26/19 0439 -- -- -- -- -- -- 80.8 kg (178 lb 3.2 oz)     I/O last 3 completed shifts:  In: 883 [P.O.:880; I.V.:3]  Out: 50 [Urine:50]    Vitals:    11/22/19 1300 11/23/19 0549 11/24/19 0256 11/25/19 0438   Weight: 79.1 kg (174 lb 4.8 oz) 78.8 kg (173 lb 12.8 oz) 80.5 kg (177 lb 6.4 oz) 81.1 kg (178 lb 11.2 oz)    11/26/19 0439   Weight: 80.8 kg (178 lb 3.2 oz)       GENERAL: awake, alert, follows  HEENT: NC/AT, PERRLA, EOMI, non icteric, pharynx moist without lesion  RESP:  clear anteriorly  CV: RRR, normal S1 S2  ABDOMEN: soft, nontender, no HSM or masses and bowel sounds normal  MS: no clubbing, cyanosis   SKIN: clear without significant rashes or lesions  NEURO: speech normal and cranial nerves 2-12 intact  PSYCH: affect flat  EXT: 2 plus edema    Data:     Recent  Labs   Lab 11/26/19  0717 11/25/19  0757 11/24/19  0627 11/23/19  0612    133 130* 129*   POTASSIUM 3.9 3.7 3.8 4.9   CHLORIDE 104 103 102 103   CO2 17* 16* 16* 15*   ANIONGAP 12 14 12 11   GLC 75 77 92 66*   BUN 72* 68* 64* 62*   CR 6.44* 6.27* 6.21* 5.86*   GFRESTIMATED 7* 7* 7* 7*   GFRESTBLACK 8* 8* 8* 8*   KARLEY 8.4* 7.4* 6.9* 7.2*         Recent Labs   Lab 11/26/19  0717 11/25/19  0757 11/24/19  0627 11/23/19  0612 11/22/19  0948 11/20/19  1253   CR 6.44* 6.27* 6.21* 5.86* 5.44* 4.76*     Recent Labs   Lab 11/25/19  0757 11/24/19  0627 11/23/19  0612 11/22/19  0948   ALBUMIN 2.4* 2.4* 2.3* 2.1*         G Paul Figueroa MD    [facility] - Nephrology  [phone]

## 2019-11-26 NOTE — PROGRESS NOTES
Pt insurance is in network.  Hospice is currently awaiting authorization from insurance company, and will need to receive this prior to pt discharging with hospice services.  BHAVIN Parmar to follow up with  Hospice tomorrow.    Pt/spouse would like to use  Hospice once insurance is verified.  Pt would like to discharge tomorrow 11/27.  Pt will need a commode and transfer shower bench.  Hospice team tentatively has her scheduled for home visit 11/29 at 930am.     POLST completed and on chart, please have MD sign and send with pt.    Please order the following comfort meds if pt will discharge with  Hospice, and send with pt:    Acetaminophen 650mg supp, give 1 supp CT every 4 hours PRN mild pain/fever  Lorazepam 0.5mg tab, give 0.5-1mg po/sl every 4 hours PRN anxiety/restlessness  Haloperidol 1mg tab, give 1-2 mg po/sl every 6 hours PRN nausea/agitation  Atropine 1% drops, give 2-4 drops po/sl every 2 hours PRN to dry secretions.  Senna 8.6mg tab, give 1-2 tabs po BID PRN constipation  Bisacodyl 10mg supp, give 1 supp daily PRN constipation  Hydromorphone 10mg/ml, give 1-2 mg (0.1-0.2ml) po/sl every 2 hours PRN pain/dyspnea      Thank you for this consult    [phone]

## 2019-11-26 NOTE — PROGRESS NOTES
[facility]  Palliative Care Progress Note  Text Page     Assessment & Plan      Palliative Care Assessment:  Radha Turcios is a 59 year old patient with history of anxiety, chronic back pain, GERD, cirrhosis, anasarca, anemia, recurrent ascites, chronic kidney disease, hepatic encephalopathy admitted at the advise of her primary care physician after serum creatinine returned abnormal. She has been treated for acute kidney injury of multifactorial etiology, labs have not improved despite treatments since admission.  Nephrology consult done and patient is not a favorable hemodialysis candidate given her comorbid state.  Concern for hepatorenal syndrome.     This is in the setting of Chronic hepatic disease and recurrent hepatic encephalopathy episodes.  she has been hospitalized three times in the past 12 months for recurrent confusion episodes and complications of hepatic disease. There is not reported or documented decline in patient's function, though patient has had episodes of confusion which has impacted her activities of daily living fairly consistently.  There is no reported or documented weight loss.        Symptoms:   Pain chronic low back pain  Nausea without vomiting - chronic, mild.  Diarrhea - secondary to lactulose, patient states it is worse.  Anorexia - decreased appetite, chronic, progressive.  Fatigue - progressive, states she appreciates assistance with ADLs as she gets tired from them.  Anxiety - baseline, has implemented behavioral and medication treatments.     Recommendations:   Please see assessments below for rationale.  1.Decisional Capacity -  Intact. Patient does not have an advance directive. Per  informed consent policy next of kin should be involved if patient becomes unable.  2. Pain- low back pain, chronic.  Does not use opioids or regularly dosed analgesia.  - voltaren gel four times daily to lower back and below shoulder blades.  - tylenol prn for mild pain  3.  Spiritual Care- Oriented to Spiritual Health as part of Palliative Care team. Spiritual Health Services declined at this time.  4. Care Planning- Appreciate Care of Nicole Rudolphlong DELCID and Cliff Jacome, with  hospice.     - Patient wishes to discharge home on hospice.  Confirmed with patient and her  today.  Discussed concern for adequate support at home for provision of cares by  and family,  states he would be open to hiring home care services to support patient's desire to be at home.    - Reviewed code status again with patient, due to concern from nursing that patient had changed her mind.  Confirmed DNR/DNI.     - Reviewed patient's feelings of safety at home with her  as primary care taker, she stated that she felt safe and had no concerns for end of life care being provided by her  and family.     Goal of care:  DNR/DNI, comfort focused.     Findings & plan of care discussed with: Bedside Nurse Dwight and Hospitalist Dr. Mckeon  Thank you for involving us in the patient's care.     Janna MARTÍNEZ, CNP  Pain Management and Palliative Care  [facility]  Pgr: [phone]    Time Spent on this Encounter   Total unit/floor time 60 minutes, time consisted of the following, examination of the patient, reviewing the record and completing documentation. >50% of time spent in counseling and coordination of care.  Time spend counseling with patient and family consisted of the following topics, education about prognosis and care planning for discharge.  Time spent in coordination of care with those listed above.     Interval History   Patient stable overnight, continues to have loose stools.  Reviewed plan of care with patient and , patient emotional, states she is ready to discharge home.    Course of Hospitalization Discussions Data   Decision-Making & Goals of Care Discussion:  Discussed on November 26, 2019:    Called to inquire if  had any  thoughts or concerns regarding plan of care as it was discussed yesterday.  He stated he was concerned about care provision at their apartment.  He stated he was the only one at home and that he needed to work at times, so that he was not going to be available during the day.  He stated that when she is at the very end of life he would take time off of work, but that if it were weeks to months he would not be able to do that.  I explained the care provided by hospice and emphasized that it would be supplemented by him and other family and friends and a very coordinated schedule, or by private hire nursing [address].  I described how patient might progress to total care and explained that he may not find her to be able to help with cares as she does now.  He expressed understanding and stated that he needed to talk about it more with her.  He seemed open to the idea of a facility, but also stated that her goal would be to be home.  I met with patient at the bedside when she was available, I reviewed her code status change with her and the conversation from yesterday regarding hospice enrollment.  She hesitated on the code status and I explained that due to her medical frailty she would likely not survive a code blue without being in a significant amount of pain and connected to machines.  She confirmed that she would not want that and agreed to the DNR/DNI.    I then discussed hospice, she agreed that she wanted to go home.  I emphasized that she may experience continued decline quickly and she may be confined to bed and less aware of her surroundings.  She verbalized understanding.  She asked if she would be in pain and I stated that the goal of care would be to avoid symptoms of pain, shortness of breath or discomfort.  I reviewed hospice philosophy and she verbalized understanding.  I told her the next step was to confirm that her  was ok with her wish to be home and that he had adequate support, and that  the hospice nurse could come in this afternoon to set up enrollment and facilitate discharge tomorrow if possible.  Patient denied further questions.    Discussed on November 25, 2019 with Janna MARTÍNEZ CNP:   Met with patient and her spouse at the bedside.  Reviewed palliative care consult and goals for discussion.  Patient stated she knows 'its bad' and that her goal is to go home and be comfortable until she dies.  States that she does not want aggressive treatments or to return to the hospital any more.   I inquired if I could talk about what hospice enrollment would look like, she asked for clarification, I indicated that her prognosis or expected recovery is poor and that she could have hospice services if she chose, she was open to the idea of support for her cares at home, asked for a meeting with  hospice.   Spouse at bedside denied questions and did not participate in the conversation.            Review of Systems    CONSTITUTIONAL: NEGATIVE for fever, chills, change in weight  ENT/MOUTH: NEGATIVE for ear, mouth and throat problems  RESP: NEGATIVE for significant cough or SOB  CV: NEGATIVE for chest pain, palpitations or peripheral edema    Palliative Symptom Review (0=no symptom/no concern, 1=mild, 2=moderate, 3=severe):      Pain: 1-mild      Fatigue: 1-mild      Nausea: 0-none      Constipation: 0-none      Diarrhea: 1-mild      Depressive Symptoms: 1-mild      Anxiety: 1-mild      Drowsiness: 0-none      Poor Appetite: 1-mild      Shortness of Breath: 0-none      Insomnia: 0-none    Physical Exam   Temp:  [96.8  F (36  C)-97.7  F (36.5  C)] 97.4  F (36.3  C)  Pulse:  [88] 88  Heart Rate:  [87-94] 94  Resp:  [16-18] 18  BP: ()/(38-57) 100/45  SpO2:  [94 %-99 %] 99 %  178 lbs 3.2 oz  GEN:  Alert, oriented x 3, appears comfortable, NAD.  HEENT:  Normocephalic/atraumatic, no scleral icterus, no nasal discharge, mouth moist.  CV:  RRR, S1, S2; no murmurs or other irregularities noted.   +3 DP/PT pulses bilatererally; no edema BLE.  RESP:  Clear to auscultation bilaterally without rales/rhonchi/wheezing/retractions.  Symmetric chest rise on inhalation noted.  Normal respiratory effort.  ABD:  Rounded, soft, distended, mild tenderness to palpation.  +BS  EXT:  Edema & pulses as noted above.  CMS intact x 4.     M/S:   No tenderness to palpation.    SKIN:  Dry to touch, jaundice.   NEURO: Symmetric strength +5/5.  Sensation to touch intact all extremities.   There is no area of allodynia or hyperesthesia.  PAIN BEHAVIOR: Cooperative  Psych:  Normal affect.  Calm, cooperative, conversant appropriately.    Medications       calcium carbonate-vitamin D  1 tablet Oral TID w/meals     diclofenac  4 g Transdermal 4x Daily     gabapentin  100 mg Oral Daily     lactulose  10 g Oral Daily     lidocaine   Transdermal Q8H     lidocaine   Transdermal Q24H     magnesium oxide  400 mg Oral Daily     melatonin  5 mg Oral At Bedtime     midodrine  10 mg Oral TID w/meals     multivitamin, therapeutic  1 tablet Oral Daily     nystatin  500,000 Units Swish & Swallow 4x Daily     omeprazole  20 mg Oral Daily     sodium chloride (PF)  3 mL Intracatheter Q8H     traZODone  75 mg Oral At Bedtime     vilazodone  40 mg Oral Daily     vitamin B1  100 mg Oral Daily     vitamin D3  2,000 Units Oral Daily       Data   Recent Labs   Lab 11/26/19  0717 11/25/19  0757 11/24/19  1955 11/24/19  0627 11/23/19  0612 11/22/19  0948   WBC 6.5 6.2  --  7.1 8.4 7.0   HGB 7.4* 7.4* 7.2* 6.0* 7.0* 7.2*   MCV 97 95  --  96 95 97   PLT 53* 47*  --  51* 53* 63*   INR  --   --   --   --  1.54* 1.47*    133  --  130* 129* 129*   POTASSIUM 3.9 3.7  --  3.8 4.9 4.6   CHLORIDE 104 103  --  102 103 103   CO2 17* 16*  --  16* 15* 13*   BUN 72* 68*  --  64* 62* 55*   CR 6.44* 6.27*  --  6.21* 5.86* 5.44*   ANIONGAP 12 14  --  12 11 13   KARLEY 8.4* 7.4*  --  6.9* 7.2* 7.1*   GLC 75 77  --  92 66* 81   ALBUMIN  --  2.4*  --  2.4* 2.3* 2.1*   PROTTOTAL   --  6.2*  --  6.1* 6.4* 6.5*   BILITOTAL  --  0.8  --  0.7 0.8 0.7   ALKPHOS  --  76  --  69 79 100   ALT  --  21  --  19 19 19   AST  --  42  --  37 39 51*

## 2019-11-26 NOTE — PLAN OF CARE
Patient A/O x4, up with SBA. Patient unable to void into hat in toilet. Hypotensive, but closely monitoring, patient denies dizziness. Patient reporting tolerable lower back pain, utilizing heat pad through the night. +3 edema to bilateral LEs. Mepilex to rt shin intact. Tele: .

## 2019-11-26 NOTE — PROGRESS NOTES
[facility]  Hospitalist Progress Note  Braeden Mckeon MD 11/25/19    Reason for Stay (Diagnosis): Renal failure, hepatorenal syndrome         Assessment and Plan:      Summary of Stay: Radha Turcios is a 59 year old female with a past medical history including liver cirrhosis secondary to EtOH use, with associated complications including hepatic encephalopathy, thrombocytopenia, anemia, ascites and coagulopathy and recent hospitalization for acute kidney injury and ESBL UTI, who presented to the hospital with concerns for worsening renal function.  She was admitted on 11/22/2019 with acute kidney injury that has progressively worsened despite IV fluids and albumin.  Nephrology was consulted.  Concern for hepatorenal syndrome and started on oral diuretics that did not seem helpful.  Has diffuse anasarca and creatinine now about 6, prognosis very guarded and palliative care has been consulted.  Not a good dialysis candidate with her hypotension and current state of liver disease.  GI was consulted and recommended diagnostic paracentesis which was done today that does not show sign of SBP.  Required transfusion RBCs for hemoglobin less than 7.  Has been started on midodrine for hypotension.  Patient is more alert today, responding to questions, stated she is going home tomorrow no matter what, has bilateral raccoon eye with right mandibular ecchymosis and hematoma, patient was not sure what happened and why it was happened.     1.  Acute renal failure on  chronic kidney disease likely secondary to hepatorenal syndrome:    - Patient is seen by nephrology in consultation, initial thought was due to frequent ibuprofen use, diuretics and recent Bactrim.  However, she has a low urine sodium and FENA.  She has not responded to IV fluids and concerned about possibility of hepatorenal syndrome.      -Did not respond to fluids or albumin.   -Nephrology consulted,  patient is not an ideal candidate for  dialysis given her hypotension, comorbidities, and advanced liver disease.    -Patient is not sure herself if she would want dialysis, her mental status fluctuates   -Palliative care consulted to address the goals of care, discussed with palliative team, input appreciated.    2. Hypotension: Suspect secondary to cirrhosis, third spacing, no evidence for infection at this time.  No sign of SBP on paracentesis.  -Started on midodrine 10 mg 3 times daily     3.  Cirrhosis: Secondary to alcohol use.  Patient reports that her last drink was on September 13.  Almost 2 months ago. Has thrombocytopenia and coagulopathy. Appears to be decompensating.   -GI consulted  -Diagnostic paracentesis 11/24 with only 100 WBCs and 8% neutrophils not consistent with SBP  -MELD score was 25.      4.  Hepatic encephalopathy: Continue lactulose at lower dose 10 g daily.  -Mental status seem to be improving.  -Stated she wants to go home tomorrow for Thanksgiving.     5.  Hyponatremia: Sodium was 130, now improved to 133.     6.  Acute on chronic anemia, thrombocytopenia:  Hemoglobin down to 6.0 on 11/24 and received 1 unit RBC transfusion.   -No obvious bleeding, though she is at increased risk.  Due to her liver disease, thrombocytopenia, coagulopathy.  Platelet count stable at around 50 K.  -Daily CBC.  8.  Bilateral raccoon eye and right mandibular ecchymosis and swelling.  -Is consistent with basilar skull fracture  -Patient has sure if she had any sort of trauma.     DVT Prophylaxis: Ambulate every shift  Code Status:  DNR/DNI.  This was discussed with palliative care and patient.  -Further goal of care needs to be addressed  Discharge Dispo: tbd.    Estimated Disch Date / # of Days until Disch: TBD pending renal function        Interval History (Subjective):      Assumed care today.  Patient known to me from recent admission.  Loose stools improving.  Unable to track urine output secondary to stooling, but she does not feel that she  "is urinating a lot.  Mental status overall okay.  Renal function not improving.  Does have some sores in her mouth looks like thrush.  Updated spouse at bedside with patient's permission.                  Physical Exam:      Last Vital Signs:  BP 92/48 (BP Location: Left arm)   Pulse 80   Temp 97.1  F (36.2  C) (Oral)   Resp 18   Ht 1.626 m (5' 4\")   Wt 80.8 kg (178 lb 3.2 oz)   LMP 09/14/2001   SpO2 99%   BMI 30.59 kg/m        Intake/Output Summary (Last 24 hours) at 11/25/2019 1432  Last data filed at 11/25/2019 1023  Gross per 24 hour   Intake 1008.42 ml   Output --   Net 1008.42 ml       Constitutional: Awake, NAD   Eyes: Right eye sclera red  HEENT: White plaques and oropharynx consistent with thrush  Respiratory: no respiratory distress, lungs cta bilaterally, no crackles or wheeze  Cardiovascular: RRR.  No murmur   GI: Moderately distended, nontender to palpation, bowel sounds present  Skin:  Ecchymoses to face  Musculoskeletal/extremities: 3+ bilateral lower extremity pitting edema  Neurologic: Somnolent later in the day, answering questions, no asterixis  Psychiatric: Tearful when discussing prognosis         Medications:      All current medications were reviewed with changes reflected in problem list.         Data:      All new lab and imaging data was reviewed.   Labs:  Recent Labs   Lab 11/26/19 0717 11/25/19 0757 11/24/19 1955 11/24/19 0627   WBC 6.5 6.2  --  7.1   HGB 7.4* 7.4* 7.2* 6.0*   HCT 22.6* 21.5*  --  18.4*   MCV 97 95  --  96   PLT 53* 47*  --  51*     Recent Labs   Lab 11/26/19  0717 11/25/19 0757 11/24/19  0627 11/23/19 0612 11/20/19  1253    133 130* 129*   < > 133   POTASSIUM 3.9 3.7 3.8 4.9   < > 5.1   CHLORIDE 104 103 102 103   < > 106   CO2 17* 16* 16* 15*   < > 15*   ANIONGAP 12 14 12 11   < > 12   GLC 75 77 92 66*   < > 71   BUN 72* 68* 64* 62*   < > 48*   CR 6.44* 6.27* 6.21* 5.86*   < > 4.76*   GFRESTIMATED 7* 7* 7* 7*   < > 9*   GFRESTBLACK 8* 8* 8* 8*   < > " 11*   AKRLEY 8.4* 7.4* 6.9* 7.2*   < > 8.1*   MAG  --  1.5* 1.6  --   --  1.4*   PHOS  --  6.8* 6.0*  --   --   --    PROTTOTAL  --  6.2* 6.1* 6.4*   < > 6.8   ALBUMIN  --  2.4* 2.4* 2.3*   < > 2.3*   BILITOTAL  --  0.8 0.7 0.8   < > 0.7   ALKPHOS  --  76 69 79   < > 115   AST  --  42 37 39   < > 39   ALT  --  21 19 19   < > 22    < > = values in this interval not displayed.      Peritoneal fluid:  111 WBCs, 8% neutrophils    Imaging:   None today      Braeden Mckeon MD

## 2019-11-26 NOTE — TELEPHONE ENCOUNTER
Rx in outgoing fax box with note she is due for OV based on my last results note   not applicable (Male)

## 2019-11-26 NOTE — PROGRESS NOTES
[facility] in [place] called requesting anticipated discharge date, as they will be providing care; Spoke with Betsy SAGASTUME on care team; Her direct number is [phone].

## 2019-11-26 NOTE — CONSULTS
Discharge Planner   Discharge Plans in progress: Jamal met with the pt and her spouse, Juan A [phone], to discuss discharge planning.  Pt was sitting in a chair with her  at the bedside.  She was pleasant and contributed appropriately to the conversation.  Pt confirmed that she would like to discharge home with hospice.  Pt/family was given the Medicare Compare list for Hospice, with associated star ratings to assist with choice for referrals/discharge planning: Yes.  The pt and Juan A were given a list of hospice agencies, they chose a couple, and sw provided them with the star ratings.  The pt and family asked for a meeting with the  Hospice Liaison, Cliff.  Jamal spoke with Cliff and she met with the pt and Juan A.   The pt chose to use [facility] at discharge.  Cliff was arranging services.  The pt's insurance said that the pt already has an open hospice case.  Cliff questioned to make sure that they were not referring to the open HC case.  The pt's insurance will investigate and then update [facility].   Hospice will accept the pt pending the insurance approval.  Pt's  will provide transport.    Education was given to pt/family that star ratings are updated/maintained by Medicare and can be reviewed by visiting www.medicare.gov, yes.    Barriers to discharge plan: Hospice agency authorization.  Follow up plan: Sw will continue with discharge planning and will be available as needed until discharge.       Entered by: Agata Mulligan 11/26/2019 5:21 PM     ALEXX Marte, [facility]  Inpatient Care Coordination  [facility]  [phone]

## 2019-11-26 NOTE — PROGRESS NOTES
"GASTROENTEROLOGY PROGRESS NOTE        SUBJECTIVE:  Patient denies abdominal pain, nausea or vomiting.  No evidence of melena.  Paracentesis yesterday negative for SBP.  No evidence of change in mental status.     OBJECTIVE:    /45 (BP Location: Left arm)   Pulse 88   Temp 97.4  F (36.3  C) (Oral)   Resp 18   Ht 1.626 m (5' 4\")   Wt 80.8 kg (178 lb 3.2 oz)   LMP 2001   SpO2 99%   BMI 30.59 kg/m    Temp (24hrs), Av.1  F (36.2  C), Min:96.8  F (36  C), Max:97.7  F (36.5  C)    Patient Vitals for the past 72 hrs:   Weight   19 0439 80.8 kg (178 lb 3.2 oz)   19 0438 81.1 kg (178 lb 11.2 oz)   19 0256 80.5 kg (177 lb 6.4 oz)       Intake/Output Summary (Last 24 hours) at 2019 1010  Last data filed at 2019 0800  Gross per 24 hour   Intake 1283 ml   Output 50 ml   Net 1233 ml        PHYSICAL EXAM     Constitutional: No distress, chronically ill-appearing female  Cardiovascular: RRR, normal S1, S2, no murmur appreciated   Respiratory: good transmission, CTAB  Abdomen: Positive bowel sounds, soft, non tender  Neuro: Cranial nerves grossly intact, no asterixis  LE: 2 + pitting edema bilaterally          Additional Comments:  ROS, FH, SH: See initial GI consult for details.     I have reviewed the patient's new clinical lab results:     Recent Labs   Lab Test 19  0717 19  0757 19  1955 19  0627 19  0612 19  0948  19  0748   WBC 6.5 6.2  --  7.1 8.4 7.0   < > 5.9   HGB 7.4* 7.4* 7.2* 6.0* 7.0* 7.2*   < > 8.2*   MCV 97 95  --  96 95 97   < > 102*   PLT 53* 47*  --  51* 53* 63*   < > 109*   INR  --   --   --   --  1.54* 1.47*  --  1.71*    < > = values in this interval not displayed.     Recent Labs   Lab Test 19   POTASSIUM 3.9 3.7 3.8   CHLORIDE 104 103 102   CO2 17* 16* 16*   BUN 72* 68* 64*   ANIONGAP 12 14 12     Recent Labs   Lab Test 19  " 11/11/19  0254  10/07/19  2220  10/06/19  2053   ALBUMIN 2.4* 2.4* 2.3*   < >  --    < >  --    < >  --    BILITOTAL 0.8 0.7 0.8   < >  --    < >  --    < >  --    ALT 21 19 19   < >  --    < >  --    < >  --    AST 42 37 39   < >  --    < >  --    < >  --    PROTEIN  --   --   --   --  20*  --  30*  --  100*    < > = values in this interval not displayed.        ASSESSMENT/ PLAN    Radha Turcios is a 59-year-old female with alcoholic cirrhosis who was admitted after being called by her PCP with an abnormal creatinine level.     1) Alcoholic cirrhosis       - MELD 29 (11/23).       - Complicated by hepatic encephalopathy, ascites, thrombocytopenia, coagulopathy.       - Presence of varices unknown (no prior EGD).       - Diagnostic paracentesis (11/25) negative for SBP     2) Diarrhea       - May be secondary to lactulose, although diarrhea has persisted even after decreasing dose.      - C.diff study was negative.      - Taper lactulose dosing to 2 stools daily.     3) Acute renal failure      - Initially thought to be related to medications (Bactrim, NSAIDs, diuretics) but with low FeNa there is also suspicion for hepatorenal syndrome.      - Creatinine continues to rise.      - Nephrology is following.  Does not appear the patient is interested in dialysis.  Palliative care has been following with plans as of yesterday to discharge home on hospice.    Discussed with VIPIN GreeneC  [facility] ( [facility])

## 2019-11-26 NOTE — PROGRESS NOTES
"SPIRITUAL HEALTH SERVICES Progress Note  FR Med Surg 3    Spiritual  Health visit per Palliative Care consultation.  Pt sleeping comfortably. Met in family visiting area with pt spouse, Juan A.  Juan A shared context of Radha's admission and the experience of her decline over that last two months.  He described each of their families and who among them may be able to help support when Radha discharges home with hospice  (possibly tomorrow).  Juan A is an  and has been  Able to us vacation time and is  Processing how Radha's care will \"play out\" and \"taking each day at a time\".     Juan A described  each  of their respective [place] upbringings and, though they are not members of a particular peterson community now, their peterson is an important part of their lives.      Provided education about hospice chaplains and grief support for family members.   Juan A became tearful when encouraged to be mindful of self-care in the days ahead and to draw on support of family and friends. He noted \"I know I will need to learn to ask for help\".   Offered brief blessing and will follow up as able before discharge.    Plan: Spiritual Health Services remains available for additional emotional/spiritual support.    Tenzin Dacosta MA  Staff   Pager: [phone]  Phone: [phone]         "

## 2019-11-26 NOTE — TELEPHONE ENCOUNTER
Received form(s) from [facility], [place], [facility] for physician orders, MSW patient missed visit and drug interaction.  Placed form(s) in/on PN's box.  Forms need to be signed and faxed to number listed on form..    Call pt to verify form was sent: No  Copy needs to be sent for scanning after completion: Yes

## 2019-11-26 NOTE — CONSULTS
Met with pt/spouse for hospice informational visit. Pt desires to return home with hospice.  Will need to check if pt's insurance is in network for  Hospice.  Pt said she will need a commode and transfer shower bench with a back when she gets home.  Provided hospice information sheet with 24 hour phone number.   Hospice will follow up once insurance is verified.      Thank you for this consult    Clfif Jacome RN  Hospice liason
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
## 2019-11-27 NOTE — PLAN OF CARE
Cr sl higher at 6.44 Hgb 7.4 Plt 53 Edema persists. Needs urine for UA and urine sodium. Did change to DNR/DNI. Plans home with possibly hospice.

## 2019-11-27 NOTE — PROGRESS NOTES
Discharge Planner   Discharge Plans in progress: Pt will discharge home today with  Hospice.  Pt's  will provide transport.  Susan from  Hospice, [phone], updated sw that they received the authorization from the pt's insurance to admit to services.  Equipment will be delivered to the pt's house after 1600 today.   Hospice will meet with the pt on Friday at 0930 at her house.     11/27/19 8427   Final Resources   Resources List Hospice   [facility] [phone], Fax: [phone]     Barriers to discharge plan: None.  Follow up plan: Sw will continue with discharge planning and will be available as needed until discharge.       Entered by: Agata Mulligan 11/27/2019 1:47 PM     ALEXX Marte, [facility]  Inpatient Care Coordination  [facility]  [phone]

## 2019-11-27 NOTE — PLAN OF CARE
Patient and spouse decided to go home with Hospice. They have meeting scheduled for 0930 on Friday. Discharged with meds and belongings with compounding pharmacy to deliver Dilaudid. Encouraged spouse to find support as well during this process.

## 2019-11-27 NOTE — PLAN OF CARE
Isolation precautions maintained. Patient A/O x4. Up to bathroom with SBA. VS stable. Tele: SR. Plan to discharge today with hospice.

## 2019-11-27 NOTE — PROGRESS NOTES
"GASTROENTEROLOGY PROGRESS NOTE        SUBJECTIVE:  Patient denies abdominal pain, nausea or vomiting.  No evidence of melena.  Tolerating diet. No fever.      OBJECTIVE:    /49 (BP Location: Left arm)   Pulse 80   Temp 97  F (36.1  C) (Oral)   Resp 16   Ht 1.626 m (5' 4\")   Wt 80.8 kg (178 lb 3.2 oz)   LMP 2001   SpO2 96%   BMI 30.59 kg/m    Temp (24hrs), Av.1  F (36.2  C), Min:96.8  F (36  C), Max:97.7  F (36.5  C)    Patient Vitals for the past 72 hrs:   Weight   19 0524 80.8 kg (178 lb 3.2 oz)   19 0439 80.8 kg (178 lb 3.2 oz)   19 0438 81.1 kg (178 lb 11.2 oz)       Intake/Output Summary (Last 24 hours) at 2019 1010  Last data filed at 2019 0800  Gross per 24 hour   Intake 1283 ml   Output 50 ml   Net 1233 ml        PHYSICAL EXAM     Constitutional: No distress, chronically ill-appearing female  Cardiovascular: RRR, normal S1, S2, no murmur appreciated   Respiratory: good transmission, CTAB  Abdomen: Positive bowel sounds, soft, non tender  Neuro: Cranial nerves grossly intact, no asterixis  LE: 2 + pitting edema bilaterally          Additional Comments:  ROS, FH, SH: See initial GI consult for details.     I have reviewed the patient's new clinical lab results:     Recent Labs   Lab Test 19  0717 19  0757 19  1955 19  0627 19  0612 19  0948  19  0748   WBC 6.5 6.2  --  7.1 8.4 7.0   < > 5.9   HGB 7.4* 7.4* 7.2* 6.0* 7.0* 7.2*   < > 8.2*   MCV 97 95  --  96 95 97   < > 102*   PLT 53* 47*  --  51* 53* 63*   < > 109*   INR  --   --   --   --  1.54* 1.47*  --  1.71*    < > = values in this interval not displayed.     Recent Labs   Lab Test 19  0717 19  0757 19  06   POTASSIUM 3.9 3.7 3.8   CHLORIDE 104 103 102   CO2 17* 16* 16*   BUN 72* 68* 64*   ANIONGAP 12 14 12     Recent Labs   Lab Test 19  0757 19  0627 19  0612  19  0254  10/07/19  2220  10/06/19  2053   ALBUMIN 2.4* " 2.4* 2.3*   < >  --    < >  --    < >  --    BILITOTAL 0.8 0.7 0.8   < >  --    < >  --    < >  --    ALT 21 19 19   < >  --    < >  --    < >  --    AST 42 37 39   < >  --    < >  --    < >  --    PROTEIN  --   --   --   --  20*  --  30*  --  100*    < > = values in this interval not displayed.        ASSESSMENT/ PLAN    Radha Turcios is a 59-year-old female with alcoholic cirrhosis who was admitted after being called by her PCP with an abnormal creatinine level.     1) Alcoholic cirrhosis       - MELD 29 (11/23).       - Complicated by hepatic encephalopathy, ascites, thrombocytopenia, coagulopathy.       - Presence of varices unknown (no prior EGD).       - Diagnostic paracentesis (11/25) negative for SBP     2) Diarrhea       - Secondary to lactulose, although diarrhea has persisted even after decreasing dose.      - C.diff study was negative.      - Taper lactulose dosing to 2 stools daily.     3) Acute renal failure      - Initially thought to be related to medications (Bactrim, NSAIDs, diuretics) but with low FeNa there is also suspicion for hepatorenal syndrome.      - Creatinine continues to rise.      - Nephrology is following. Palliative care has been following with plans as of yesterday to discharge home on hospice.    Discussed with Dr. Janice Hernandez PAJamiC  [facility] ( [facility])

## 2019-11-29 NOTE — TELEPHONE ENCOUNTER
Hospital F/U 11/27/2019 DX:Acute Renal Failure, Unspecified Acute Renal Failure Type (H), Hospice Care Patient ED/IP 1/3    [phone] (home)

## 2019-11-29 NOTE — TELEPHONE ENCOUNTER
PN,  Did you want to see pt for hospital f/u?  Looks like she was admitted to hospice.  Please advise.  Thanks,  Lizy Baird RN        ED / Discharge Outreach Protocol    Discharge Diagnoses     Acute renal failure on  chronic kidney disease/ Hepatorenal syndrome:    Hypotension  End stage Cirrhosis.  Hepatic Encephalopathy  Anemia and Thrombocytopenia  Bilateral periorbital ecchymosis/ raccoon eye, unclear trauma      Follow-ups Needed After Discharge     Follow-up Appointments     Follow-up and recommended labs and tests       Follow up with hospice as instructed at home.

## 2019-11-29 NOTE — PROGRESS NOTES
Clinic Care Coordination Contact    Situation: Patient chart reviewed by care coordinator.    Background: received notification of hospital discharge to home with hospice care    Assessment: as above    Plan/Recommendations: did not reach out to patient as patient has discharged home with hospice care. Will close to care coordination services at this time.     Chanda Rollins RN  [facility]-Care Coordination  [facility]  [facility]  [phone]

## 2019-12-02 NOTE — TELEPHONE ENCOUNTER
MTM referral from: Transitions of Care (recent hospital discharge or ED visit)    MTM referral outreach attempt #2 on December 2, 2019 at 9:24 AM      Outcome: Patient not reachable after several attempts, will route to MTM Pharmacist/Provider as an FYI. Thank you for the referral.    See [facility] Pharmacy Coordinator

## 2019-12-02 NOTE — LETTER
December 3, 2019      Radha Turcios  [address]   [place] [address]        Dear Radha,     December 3, 2019    Radha Turcios  [address]   [place] [address]      Dear Radha,    Dr. Mcintyre has recommended you schedule a Medication Therapy Management (MTM) appointment. MTM is designed to help you get the most of out of your medicines.     During an MTM appointment a specially trained pharmacist will review all of your medicines, both prescription and over-the-counter. They will make sure your medicines are the best choice for you and are safe and convenient for you.  MTM pharmacists work together with you and your doctor to help you understand your medicines, solve any problems related to your medicines and help you get the best results from taking your medicines.     At [facility], we strongly believe in a team approach to health care. We want to help you understand your medicines and health conditions. To learn more about how you might benefit from MTM services, watch the patient video at [email or web address].     To make an appointment, please call the clinic at [phone] or the MTM scheduling line at [phone] (toll-free at [phone]).    We look forward to hearing from you!        Ashley Beth PharmD, DEBBY, BCACP  MTM Pharmacist, [facility]

## 2019-12-03 NOTE — PROGRESS NOTES
Scheduled Follow Up Call: Attempt 1 Community Health Worker called patient's home number on file and was unable to leave a message for the patient because the mailbox is full. If the patient is returning my call, please transfer the patient to [facility] at [phone].

## 2019-12-03 NOTE — PROGRESS NOTES
Paracentesis: Pt tolerated well. VSS. 4150 cc cloudy straw colored fluid removed from abdomen w/o difficulty. Bandaid applied to site - CDI. No Albumin given per protocol.     1455 Pt back to [facility].   1515 Pt discharged per w/c to private vehicle. All personal belongings taken with pt.

## 2019-12-03 NOTE — DISCHARGE INSTRUCTIONS

## 2019-12-03 NOTE — PROGRESS NOTES
Care Suites Admission Nursing Note    Reason for admission:Paracentesis  CS arrival time: 1315  Accompanied by: Lexie (spouse)  Name/phone of DC : Lexie   Medications held: NA  Consent signed: per MD  Abnormal assessment/labs: WDL  If abnormal, provider notified: [facility]  Education/questions answered: Reviewed discharge instructions with pt and spouse, states understanding  Plan: Proceed with paracentesis.

## 2019-12-04 NOTE — PROGRESS NOTES
Scheduled Follow Up Call: Attempt 1 to reach out to Melissa, patient's contact.  Community Health Worker  left a message for  for the patient's sister in law. If the patient or contact, Melissa,  is returning my call, please transfer the patient to [facility] at [phone].

## 2019-12-05 NOTE — PROGRESS NOTES
Scheduled Follow Up Call: Community Health Worker called patient's spouse, Lexie, and left a message. If the patient or spouse is returning my call, please transfer the patient to [facility] at [phone].  _________________  Melissa, sister in law, called back to provide number for patient's spouse, Lexie.   __________________  Community Health Worker called and left a message for the patient's sister in law.  Patient's mailbox is still full and cannot receive messages.  If the patient or patient contact is returning my call, please transfer the patient to [facility] at [phone].   Patient has been mailed a unreachable letter and was provided with [facility] contact information if they are interested in accessing Clinic Care Coordination.  Order for Care Management has been closed, no further outreach will be done at this time and patient can be re-referred.

## 2019-12-05 NOTE — LETTER
Dear Radha,                                                                         You were recently referred to the [facility]'s Clinic Care Coordination service.  This is a service offered through your Primary Care Clinic which can help you access resources and services in regard to your health and well-being goals. The clinic Community Health Worker has placed two calls to you to discuss the nature of this service and to offer enrollment.  If you are interested in learning more about Clinic Care Coordination, please call your Primary Care Clinic's Community Health Worker, [facility], at [phone].                                                    Sincerely,                                                                                                                                                                        Clinic Care Coordination                                                  [facility]

## 2019-12-10 ENCOUNTER — TELEPHONE (OUTPATIENT)
Dept: FAMILY MEDICINE | Facility: CLINIC | Age: 59
End: 2019-12-10

## 2019-12-10 NOTE — TELEPHONE ENCOUNTER
Reason for Call:  Other death certificate    Detailed comments: Seble from [employer] [place] called requesting a signed death certificate. There was an email sent to Dr Mcintyre from the [employer] that will direct her to do it online    Phone Number Patient can be reached at: [phone]    Best Time: any    Can we leave a detailed message on this number? YES    Call taken on 12/10/2019 at 11:51 AM by Lori Puri

## 2019-12-10 NOTE — TELEPHONE ENCOUNTER
PN,   Please see below  Called [employer] - let her know you are on vacation but return tomorrow  They need everything completed tomorrow (Wed)  They will need to cremate pt Thursday  Please advise  Thanks,  Natalie LOERA RN

## 2019-12-11 ENCOUNTER — MEDICAL CORRESPONDENCE (OUTPATIENT)
Dept: HEALTH INFORMATION MANAGEMENT | Facility: CLINIC | Age: 59
End: 2019-12-11

## 2019-12-12 ENCOUNTER — TELEPHONE (OUTPATIENT)
Dept: FAMILY MEDICINE | Facility: CLINIC | Age: 59
End: 2019-12-12

## 2019-12-12 NOTE — TELEPHONE ENCOUNTER
Received form(s) from [facility]  For past orders .  Placed form(s) in/on PN's box.  Forms need to be signed and faxed to [phone]..    Call pt to verify form was sent: No  Copy needs to be sent for scanning after completion: Yes

## 2019-12-13 ENCOUNTER — MEDICAL CORRESPONDENCE (OUTPATIENT)
Dept: HEALTH INFORMATION MANAGEMENT | Facility: CLINIC | Age: 59
End: 2019-12-13

## 2019-12-17 ENCOUNTER — MEDICAL CORRESPONDENCE (OUTPATIENT)
Dept: HEALTH INFORMATION MANAGEMENT | Facility: CLINIC | Age: 59
End: 2019-12-17

## 2019-12-17 ENCOUNTER — TELEPHONE (OUTPATIENT)
Dept: FAMILY MEDICINE | Facility: CLINIC | Age: 59
End: 2019-12-17

## 2019-12-17 NOTE — TELEPHONE ENCOUNTER
Received form(s) from Hospice Certification of terminal illness for Review and signature.  Placed form(s) in/on PN's box.  Forms need to be signed and faxed to number listed on form..    Call pt to verify form was sent: No  Copy needs to be sent for scanning after completion: Yes

## 2019-12-20 ENCOUNTER — TELEPHONE (OUTPATIENT)
Dept: FAMILY MEDICINE | Facility: CLINIC | Age: 59
End: 2019-12-20
Payer: COMMERCIAL

## 2019-12-27 ENCOUNTER — TELEPHONE (OUTPATIENT)
Dept: FAMILY MEDICINE | Facility: CLINIC | Age: 59
End: 2019-12-27

## 2019-12-27 NOTE — TELEPHONE ENCOUNTER
Received form(s) from [facility]  for Physicians orders.  Placed form(s) in/on PN's box.  Forms need to be signed and faxed to number listed on form..    Call pt to verify form was sent: No  Copy needs to be sent for scanning after completion: Yes

## 2020-01-10 ENCOUNTER — TELEPHONE (OUTPATIENT)
Dept: FAMILY MEDICINE | Facility: CLINIC | Age: 60
End: 2020-01-10
Payer: COMMERCIAL

## 2020-01-10 NOTE — TELEPHONE ENCOUNTER
Received form(s) from [facility] for Physician orders.  Placed form(s) in/on PN's box.  Forms need to be signed and faxed to number listed on form..    Call pt to verify form was sent: No  Copy needs to be sent for scanning after completion: Yes
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
## 2021-04-29 NOTE — PROGRESS NOTES
"  SUBJECTIVE:   Radha Turcios is a 58 year old female who presents to clinic today for the following health issues:    Radha is here to discuss life-long anxiety and previously Rx Alprazolam for years. I became PCP after her prior PCP retired just over a year ago in January 2017. She and her family have long-standing anxiety. Has quite high dose of Alprazolam at 0.5 mg TID PRN and also is on daily Viibryd 40 mg daily and Amitriptyline for h/o depression as well.  Lost 6 sisters and 2 brothers and another sister recently diagnosed with metastatic breast cancer. Says that for \"6 years I didn't leave the apartment without Xanax\". Estimates starting Xanax about 15 years ago. She now says she takes it TID and \"it keeps me comfortable\". She is easily distracted and asks me to repeat the questions. Refuses to go to a psychiatrist b/c of potential cost and she relies on others to drive her to appointments.  Says she and  don't get along much and he is a \"workaholic\". Says she feels safe at home though but spends much of her time alone.   Smoking over a ppd and not wanting to discuss cessation.  Admits to drinking 1-2 drinks most days but \"it varies\" and she is vague about it. Discussed slightly elevated AST last time and she also did cut down on Crestor b/c cholesterol was plenty low.  Declines counseling or anything else I can do to help.  Enjoys going to [employer] with sister but not much else.     Problem list and histories reviewed & adjusted, as indicated.    ROS:  Detailed as above     OBJECTIVE:     /85 (BP Location: Right arm, Patient Position: Sitting, Cuff Size: Adult Regular)  Pulse 85  Temp 97.5  F (36.4  C) (Oral)  Ht 5' 4.25\" (1.632 m)  Wt 154 lb (69.9 kg)  LMP 09/14/2001  SpO2 98%  Breastfeeding? No  BMI 26.23 kg/m2  Body mass index is 26.23 kg/(m^2).  Casually dressed  Initially tearful and then calmed as visit progressed  A few times claims that she is having trouble focusing and " "needs extensive clarification re: e-prescribe of propranolol and printed Rx Alprazolam  Also once asked me to repeat a question as she started answering and said, \"wait, what did you ask?\"    Wt Readings from Last 4 Encounters:   07/18/18 154 lb (69.9 kg)   10/02/17 150 lb 1.6 oz (68.1 kg)   01/16/17 156 lb 14.4 oz (71.2 kg)   08/10/16 152 lb (68.9 kg)     CYRUS-7 SCORE 1/16/2017 10/2/2017 7/18/2018   Total Score - - -   Total Score 9 12 14       PHQ-9 SCORE 1/16/2017 10/2/2017 7/18/2018   Total Score - - -   Total Score 11 10 5       ASSESSMENT/PLAN:     1. Anxiety, h/o Panic Disorder with Agoraphobia and Major Depressive Disorder  Significant discussion today though was hard for her to focus. Has chronic h/o of the above disorders and long-standing benzodiazepine use (as does her sister). Strong family h/o siblings not going to the doctor and all seemingly coming down with horrific health problems which terrifies her with many deaths. She continues on Amitriptyline and Viibryd. We discussed this as a partnership of physician-patient and working together to hopefully improve her health. Even on her current dose of Xanax, her symptoms are not well controlled. She at least was open to the idea of adding in Propranolol as an additional medication (scheduled) and instead of taking Xanax TID prn, dose reduced to BID prn. She admits she has a lot of physical symptoms of anxiety that Propranolol may help. Close follow up discussed and expected. She adamantly refused counseling or seeing another provider / psychiatrist as noted above in HPI. She voices that she does see the benefit of the Viibryd.  - ALPRAZolam (XANAX) 0.5 MG tablet; Take 1 tablet (0.5 mg) by mouth 2 times daily as needed Prescription to last one month  Dispense: 60 tablet; Refill: 2  - propranolol (INDERAL) 20 MG tablet; Take 1 tablet (20 mg) by mouth 2 times daily  Dispense: 60 tablet; Refill: 2    2. Abnormal TSH  Recheck labs today; TPO unable to be " "added on so can obtain in the future  - TSH with free T4 reflex    3. Elevated AST (SGOT)  Will recheck to compare to last lab; concern its r/t alcohol use  Also did decrease her Crestor after last visit b/c of really low cholesterol  - Hepatic panel (Albumin, ALT, AST, Bili, Alk Phos, TP)    4. Tobacco use  She says she doesn't want to discuss cessation today  Doesn't fit into lung cancer screening guidelines    5. Mixed hyperlipidemia  Decreased dose last visit d/t low cholesterol  - rosuvastatin (CRESTOR) 40 MG tablet; Take 0.5 tablets (20 mg) by mouth daily  Dispense: 45 tablet; Refill: 1    6. Controlled substance agreement signed  Discussed working together for her health and that Xanax is a controlled substance    7. Alcohol use  Discussed no more than 1 etoh drink per day      Patient Instructions   Printed prescription for Xanax given to you - please only take as needed for anxiety up to two times per day. Each prescription to last a month.    To further help with your anxiety while the Xanax dose is reduced slightly, there is a new medication I prescribed called \"Propranolol\" for you to take two times per day (morning and night) to help with making you safely feel more calm.    Continue your other medications as prescribed.    Labs today.    Since you have declined following up with a psychiatrist or counselor at this time, then I'd like you to follow up with me in clinic to monitor your anxiety in 3 months given the changes we've made today.    Please call us with questions at any time.    She repeated back to me expectation for f/u in 3 months (or sooner as needed) and continuing to work together on her health care.    Azul Parker, DO  [facility]    " normal sinus rhythm

## 2022-05-03 NOTE — PROGRESS NOTES
GASTROENTEROLOGY PROGRESS NOTE     SUBJECTIVE: Still confused. About the same as yesterday. Denies pain, nausea or vomiting     OBJECTIVE:   BP 98/51 (BP Location: Right arm)   Pulse 108   Temp 98.1  F (36.7  C) (Tympanic)   Resp 20   Wt 66.1 kg (145 lb 11.2 oz)   LMP 2001   SpO2 92%   BMI 24.82 kg/m     Temp (24hrs), Av.7  F (37.1  C), Min:97.9  F (36.6  C), Max:100.2  F (37.9  C)     No data found.     Intake/Output Summary (Last 24 hours) at 10/11/2019 0839  Last data filed at 10/11/2019 0638  Gross per 24 hour   Intake 2963 ml   Output 1350 ml   Net 1613 ml      PHYSICAL EXAM   Constitutional: Older appearing than stated age, in bed, no acute distress  Cardiovascular: Regular rate and rhythm. No murmurs rubs or gallops  Respiratory: Clear to auscultation bilaterally  Abdomen: Soft, non-tender, non-distended, normally active bowel sounds. No masses or hepatosplenomegaly appreciated. No guarding or rebound tenderness.    Additional Comments:   ROS, FH, SH: See initial GI consult for details.     I have reviewed the patient's new clinical lab results:   Recent Labs   Lab Test 10/11/19  0813 10/10/19  1004 10/10/19  0831 10/09/19  0537  10/08/19  0532   WBC 9.8  --  9.6 12.8*  --  12.0*   HGB 7.8*  --  8.1* 8.2*   < > 8.1*   *  --  105* 104*  --  102*   PLT 67*  --  56* 57*  --  62*   INR  --  1.88*  --  2.01*  --  2.05*    < > = values in this interval not displayed.      Recent Labs   Lab Test 10/10/19  0831 10/09/19  0537 10/08/19  1257   * 145* 140   POTASSIUM 3.0* 3.5 4.6   CHLORIDE 114* 114* 113*   CO2 27 23 21   BUN 21 30 39*   CR 1.26* 2.14* 2.79*   ANIONGAP 4 8 6   KARLEY 8.5 8.6 8.5      Recent Labs   Lab Test 10/10/19  0831 10/07/19  2220 10/07/19  0612 10/06/19  2053 10/06/19  1852  08/10/16  1337   ALBUMIN 2.2*  --  2.1*  --  1.8*   < >  --    BILITOTAL 2.6*  --  2.0*  --  2.3*   < >  --    ALT 39  --  26  --  30   < >  --    AST 72*  --  59*  --  60*   < >  --    ALKPHOS 142   --  86  --  99   < >  --    PROTEIN  --  30*  --  100*  --   --  Negative    < > = values in this interval not displayed.      10/6/19 CT abdomen pelvis without contrast  IMPRESSION:  1. Large amount of ascites and mesenteric edema are likely secondary  to liver failure, given the patient's history. The liver is small  which could be secondary to cirrhosis. No evidence for splenomegaly,  however, to suggest portal venous hypertension.  2. Gallbladder is filled with radiopaque material which could  represent gallstones.  3. Low-attenuation thickening of the wall of the cecum and ascending  colon could represent chronic inflammatory change. Appendix is not  well-seen on this study.  4. Colonic diverticulosis without evidence for acute diverticulitis.  5. Fibroid uterus.  6. Evaluation of solid organs of the abdomen and pelvis is  significantly limited by lack of IV contrast. Evaluation of the hollow  organs is limited by lack of oral contrast.     Assessment: 59-year-old female with a history of alcohol abuse presenting with hepatic encephalopathy and new ascites. Ascites was tapped in the ED and was negative for SBP. NG placed and started on lactulose with gradual improvement of her mentation. FIGUEROA continues to improve. Pt likely has EtOH cirrhosis based on her presentation and labs.   Pt continues to be confused. Probably still from hepatic encephalopathy. Electrolyte imbalances could be playing a role as well.     Plan:   -Continue lactulose and nutrition via NG.  -Increase lactulose to TID and start Rifaximin as well  -Therapeutic paracentesis today   -Follow hgb and stool output. Blood transfusions per IM  -Follow LFTs and renal function daily  -Eventual EGD to screen for varices, likely can be done as outpatient  -Eventual hepatology clinic follow up. Our office will arrange  -EtOH abstinence  -[facility] following.  Dr Nick Yeung is rounding if needed.     Addison Hardy PA-C  [facility]  Cell:   [phone] Monday through Friday 8341-8102  Office: [phone]     show

## 2022-09-28 NOTE — TELEPHONE ENCOUNTER
PCP Please review below.  Pt has 9 tabs left, rx is for up to 3 tabs PRN daily.  Last RX'ed 30 tabs on 6/5 which would mean pt is taking on average 1.3 tablets a day.  Pended RX for 45 tabs with 1 refill for review.    Rhea Linares RN         [[email or web address]] : Ziggy is a 47yo patient of Dr. Guidry, p/w persistent reactive cough post covid19 concern s/p MAB infusion, lungs are CTA, patient in no acute distress, +sinus fullness/ large PND sx likely 2/2 acute sinusitis, Doxy likely to manage as well- will continue on current mgmt, advised f/u worsening/ persistent concerns. discussed plan with Dr. Villatoro. \par \par - continue Doxy 100mg BID for 10 day course\par \par - continue prednisone taper; patient is on day 3 of 20mg, will take 20mg tomorrow, and 10mg four days following, to complete therapy. \par \par - sent new Rx for Promethazine-Codiene to [facility], pharmacist reports having supply to fill 120ml of medication for mgmt cough. \par \par patient will o/w continue medications as prescribed, and continue f/u with specialists & PCP Dr. Guidry.\par \par notify office if worsening/persistent symptoms or new onset complaints/concerns, in the event of any emergency or life threatening condition, go to the nearest emergency department and then notify office. \par patient verbalized understanding and agrees with plan of care.
